# Patient Record
Sex: FEMALE | Race: WHITE | HISPANIC OR LATINO | URBAN - METROPOLITAN AREA
[De-identification: names, ages, dates, MRNs, and addresses within clinical notes are randomized per-mention and may not be internally consistent; named-entity substitution may affect disease eponyms.]

---

## 2017-06-21 ENCOUNTER — OUTPATIENT (OUTPATIENT)
Dept: OUTPATIENT SERVICES | Facility: HOSPITAL | Age: 82
LOS: 1 days | Discharge: HOME | End: 2017-06-21

## 2017-06-21 DIAGNOSIS — N39.0 URINARY TRACT INFECTION, SITE NOT SPECIFIED: ICD-10-CM

## 2017-06-21 DIAGNOSIS — I50.9 HEART FAILURE, UNSPECIFIED: ICD-10-CM

## 2017-06-28 DIAGNOSIS — R06.02 SHORTNESS OF BREATH: ICD-10-CM

## 2018-06-13 ENCOUNTER — OUTPATIENT (OUTPATIENT)
Dept: OUTPATIENT SERVICES | Facility: HOSPITAL | Age: 83
LOS: 1 days | Discharge: HOME | End: 2018-06-13

## 2018-06-13 DIAGNOSIS — D68.9 COAGULATION DEFECT, UNSPECIFIED: ICD-10-CM

## 2018-06-13 DIAGNOSIS — I10 ESSENTIAL (PRIMARY) HYPERTENSION: ICD-10-CM

## 2018-06-13 DIAGNOSIS — E34.9 ENDOCRINE DISORDER, UNSPECIFIED: ICD-10-CM

## 2018-06-18 ENCOUNTER — OUTPATIENT (OUTPATIENT)
Dept: OUTPATIENT SERVICES | Facility: HOSPITAL | Age: 83
LOS: 1 days | Discharge: HOME | End: 2018-06-18

## 2018-06-18 DIAGNOSIS — I48.91 UNSPECIFIED ATRIAL FIBRILLATION: ICD-10-CM

## 2018-06-18 RX ORDER — KETOROLAC TROMETHAMINE 30 MG/ML
30 SYRINGE (ML) INJECTION ONCE
Qty: 0 | Refills: 0 | Status: DISCONTINUED | OUTPATIENT
Start: 2018-06-18 | End: 2018-06-18

## 2018-06-18 RX ADMIN — Medication 30 MILLIGRAM(S): at 14:06

## 2018-06-18 NOTE — H&P CARDIOLOGY - HISTORY OF PRESENT ILLNESS
Patient is 85y Female presents for [facility] to eval mitral valve disease.  she c/o sob with exertion.       PMH   cad, htn, dld, dvt     SOCIAL HISTORY   denies     SURGICAL HISTORY   cabg     FAMILY HISTORY   denies       REVIEW OF SYSTEMS     CONSTITUTIONAL: No weakness, fevers or chills  EYES/ENT: No visual changes;  No vertigo or throat pain   NECK: No pain or stiffness  RESPIRATORY: No cough, wheezing, hemoptysis; SEE HPI  CARDIOVASCULAR: SEE HPI  GASTROINTESTINAL: No abdominal or epigastric pain. No nausea, vomiting, or hematemesis; No diarrhea or constipation. No melena or hematochezia.  GENITOURINARY: No dysuria, frequency or hematuria  NEUROLOGICAL: No numbness or weakness  SKIN: No itching, rashes       PHYSICAL EXAM       GENERAL: NAD  HEAD:  Atraumatic, Normocephalic  EYES: conjunctiva and sclera clear  NECK: No JVD  CHEST/LUNG: Clear to auscultation bilaterally; No wheeze  HEART: Regular rate and rhythm; No murmurs  ABDOMEN: Soft, Nontender, Nondistended; Bowel sounds present  EXTREMITIES:  PTs/DPs 2+ ,  Rads/Ulnars 2+,  No clubbing, cyanosis, or edema  NEUROLOGY:  A&Ox3, appropriate  SKIN: No rashes or lesions

## 2018-06-18 NOTE — PROGRESS NOTE ADULT - SUBJECTIVE AND OBJECTIVE BOX
I have personally seen and examined the patient.  I agree with the history and physical which I have reviewed and noted any changes below.  06-18-18 @ 12:21    PRE-OP DIAGNOSIS: cad    PROCEDURE:    Physician: cici  Assistant:    ANESTHESIA TYPE:  [  ]General Anesthesia  [  ] Sedation  [  ] Local/Regional    ESTIMATED BLOOD LOSS:       mL    CONDITION  [  ] Critical  [  ] Serious  [  ]Fair  [  ]Good      SPECIMENS REMOVED (IF APPLICABLE):      IV CONTRAST:             mL      IMPLANTS (IF APPLICABLE)      FINDINGS    Left Heart Catheterization:  LVEF%:  LVEDP:  [ ] Normal Coronary Arteries  [ ] Luminal Irregularities  [ ] Non-obstructive CAD      LEFT HEART CATHERIZATION                                    Left main     LAD:                        Diag:    Left Circumflex:  OM:    Right Cornary Artery:  RPDA  RPL    Ramus Intermed:    INTERVENTION  IMPLANTS:    RIGHT HEART CATHERIZATION  PA:  PCW:  CO/CI:      POST-OP DIAGNOSIS          PLAN OF CARE  [ ] D/C Home today  [ ]  D/C in AM  [ ] Return to In-patient bed  [ ] Admit for observation  [ ] Return for staged procedure:  [ ] CT Surgery consult called  [ ]  Continue DAPT, B-blocker & Statin therapy I have personally seen and examined the patient.  I agree with the history and physical which I have reviewed and noted any changes below.  06-18-18 @ 12:21    PRE-OP DIAGNOSIS: cad    PROCEDURE: lhc/sca/svg/lima    Physician: cici  Assistant: annie    ANESTHESIA TYPE:  [  ]General Anesthesia  [ x ] Sedation  [ x ] Local/Regional    ESTIMATED BLOOD LOSS:  10     mL    CONDITION  [  ] Critical  [  ] Serious  [  ]Fair  [ x ]Good      SPECIMENS REMOVED (IF APPLICABLE):      IV CONTRAST:   80          mL      IMPLANTS (IF APPLICABLE)      FINDINGS    Left Heart Catheterization:  LVEF%: 16  LVEDP:  [ ] Normal Coronary Arteries  [ ] Luminal Irregularities  [x ] 2v native CAD, patent BYPASS GRAFTS        POST-OP DIAGNOSIS    same      PLAN OF CARE  [x ] D/C Home today  [ ]  D/C in AM  [ ] Return to In-patient bed  [ ] Admit for observation  [ ] Return for staged procedure:  [ ] CT Surgery consult called  [x ]  Continue DAPT, B-blocker & Statin therapy

## 2018-06-18 NOTE — PROCEDURE NOTE - ADDITIONAL PROCEDURE DETAILS
CAROLINA Procedure Note:     After risks, benefits and alternatives of the procedure were explained, consent was signed and placed in the medical record.  Procedural timeout was taken.  Sedation was administered by anesthesia.  CAROLINA probe inserted without complication and CAROLINA performed.  Patient tolerated the procedure well without complication.  Post-procedure vital signs were stable.    CAROLINA findings discussed with patient and referring cardiologist.       CAROLINA findings:  LVEF 50-55%  Severe mitral stenosis 3D planimetry valve area 1cm2   Mitral valve ring noted  mild TR  severely dilated LA  moderate AI eccenteric jet  RAYNA no thrombus noted      See full report for findings.      Post procedure vitals: stable      Post procedure EKG : Sinus at 81 bpm    Recommendations:  Continue current medications.  Will discuss with family regarding further management options.

## 2020-06-04 ENCOUNTER — INPATIENT (INPATIENT)
Facility: HOSPITAL | Age: 85
LOS: 4 days | Discharge: HOME HEALTH | End: 2020-06-09
Attending: INTERNAL MEDICINE | Admitting: INTERNAL MEDICINE
Payer: MEDICARE

## 2020-06-04 VITALS
OXYGEN SATURATION: 95 % | HEART RATE: 94 BPM | TEMPERATURE: 101 F | RESPIRATION RATE: 20 BRPM | DIASTOLIC BLOOD PRESSURE: 60 MMHG | SYSTOLIC BLOOD PRESSURE: 124 MMHG

## 2020-06-04 LAB
ALBUMIN SERPL ELPH-MCNC: 3.2 G/DL — LOW (ref 3.5–5.2)
ALP SERPL-CCNC: 93 U/L — SIGNIFICANT CHANGE UP (ref 30–115)
ALT FLD-CCNC: 33 U/L — SIGNIFICANT CHANGE UP (ref 0–41)
ANION GAP SERPL CALC-SCNC: 15 MMOL/L — HIGH (ref 7–14)
AST SERPL-CCNC: 45 U/L — HIGH (ref 0–41)
BASE EXCESS BLDV CALC-SCNC: 9.1 MMOL/L — HIGH (ref -2–2)
BASOPHILS # BLD AUTO: 0 K/UL — SIGNIFICANT CHANGE UP (ref 0–0.2)
BASOPHILS NFR BLD AUTO: 0 % — SIGNIFICANT CHANGE UP (ref 0–1)
BILIRUB SERPL-MCNC: 0.7 MG/DL — SIGNIFICANT CHANGE UP (ref 0.2–1.2)
BLD GP AB SCN SERPL QL: SIGNIFICANT CHANGE UP
BUN SERPL-MCNC: 47 MG/DL — HIGH (ref 10–20)
CA-I SERPL-SCNC: 1.19 MMOL/L — SIGNIFICANT CHANGE UP (ref 1.12–1.3)
CALCIUM SERPL-MCNC: 9.1 MG/DL — SIGNIFICANT CHANGE UP (ref 8.5–10.1)
CHLORIDE SERPL-SCNC: 95 MMOL/L — LOW (ref 98–110)
CO2 SERPL-SCNC: 30 MMOL/L — SIGNIFICANT CHANGE UP (ref 17–32)
CREAT SERPL-MCNC: 1.4 MG/DL — SIGNIFICANT CHANGE UP (ref 0.7–1.5)
EOSINOPHIL # BLD AUTO: 0 K/UL — SIGNIFICANT CHANGE UP (ref 0–0.7)
EOSINOPHIL NFR BLD AUTO: 0 % — SIGNIFICANT CHANGE UP (ref 0–8)
GAS PNL BLDV: 139 MMOL/L — SIGNIFICANT CHANGE UP (ref 136–145)
GAS PNL BLDV: SIGNIFICANT CHANGE UP
GIANT PLATELETS BLD QL SMEAR: PRESENT — SIGNIFICANT CHANGE UP
GLUCOSE SERPL-MCNC: 165 MG/DL — HIGH (ref 70–99)
HCO3 BLDV-SCNC: 35 MMOL/L — HIGH (ref 22–29)
HCT VFR BLD CALC: 37.5 % — SIGNIFICANT CHANGE UP (ref 37–47)
HCT VFR BLDA CALC: 48.5 % — HIGH (ref 34–44)
HGB BLD CALC-MCNC: 15.8 G/DL — SIGNIFICANT CHANGE UP (ref 14–18)
HGB BLD-MCNC: 12.4 G/DL — SIGNIFICANT CHANGE UP (ref 12–16)
INR BLD: 1.19 RATIO — SIGNIFICANT CHANGE UP (ref 0.65–1.3)
LACTATE BLDV-MCNC: 1.2 MMOL/L — SIGNIFICANT CHANGE UP (ref 0.5–1.6)
LIDOCAIN IGE QN: 10 U/L — SIGNIFICANT CHANGE UP (ref 7–60)
LYMPHOCYTES # BLD AUTO: 0.19 K/UL — LOW (ref 1.2–3.4)
LYMPHOCYTES # BLD AUTO: 0.9 % — LOW (ref 20.5–51.1)
MACROCYTES BLD QL: SLIGHT — SIGNIFICANT CHANGE UP
MANUAL SMEAR VERIFICATION: SIGNIFICANT CHANGE UP
MCHC RBC-ENTMCNC: 29 PG — SIGNIFICANT CHANGE UP (ref 27–31)
MCHC RBC-ENTMCNC: 33.1 G/DL — SIGNIFICANT CHANGE UP (ref 32–37)
MCV RBC AUTO: 87.6 FL — SIGNIFICANT CHANGE UP (ref 81–99)
MONOCYTES # BLD AUTO: 1.29 K/UL — HIGH (ref 0.1–0.6)
MONOCYTES NFR BLD AUTO: 6.1 % — SIGNIFICANT CHANGE UP (ref 1.7–9.3)
NEUTROPHILS # BLD AUTO: 19.73 K/UL — HIGH (ref 1.4–6.5)
NEUTROPHILS NFR BLD AUTO: 88.7 % — HIGH (ref 42.2–75.2)
NEUTS BAND # BLD: 4.3 % — SIGNIFICANT CHANGE UP (ref 0–6)
OVALOCYTES BLD QL SMEAR: SLIGHT — SIGNIFICANT CHANGE UP
PCO2 BLDV: 48 MMHG — SIGNIFICANT CHANGE UP (ref 41–51)
PH BLDV: 7.46 — HIGH (ref 7.26–7.43)
PLAT MORPH BLD: NORMAL — SIGNIFICANT CHANGE UP
PLATELET # BLD AUTO: 208 K/UL — SIGNIFICANT CHANGE UP (ref 130–400)
PO2 BLDV: 31 MMHG — SIGNIFICANT CHANGE UP (ref 20–40)
POLYCHROMASIA BLD QL SMEAR: SIGNIFICANT CHANGE UP
POTASSIUM BLDV-SCNC: 3 MMOL/L — LOW (ref 3.3–5.6)
POTASSIUM SERPL-MCNC: 3.4 MMOL/L — LOW (ref 3.5–5)
POTASSIUM SERPL-SCNC: 3.4 MMOL/L — LOW (ref 3.5–5)
PROT SERPL-MCNC: 6.5 G/DL — SIGNIFICANT CHANGE UP (ref 6–8)
PROTHROM AB SERPL-ACNC: 13.7 SEC — HIGH (ref 9.95–12.87)
RBC # BLD: 4.28 M/UL — SIGNIFICANT CHANGE UP (ref 4.2–5.4)
RBC # FLD: 14.7 % — HIGH (ref 11.5–14.5)
RBC BLD AUTO: ABNORMAL
SAO2 % BLDV: 59 % — SIGNIFICANT CHANGE UP
SODIUM SERPL-SCNC: 140 MMOL/L — SIGNIFICANT CHANGE UP (ref 135–146)
TROPONIN T SERPL-MCNC: <0.01 NG/ML — SIGNIFICANT CHANGE UP
WBC # BLD: 21.22 K/UL — HIGH (ref 4.8–10.8)
WBC # FLD AUTO: 21.22 K/UL — HIGH (ref 4.8–10.8)

## 2020-06-04 PROCEDURE — 93010 ELECTROCARDIOGRAM REPORT: CPT

## 2020-06-04 PROCEDURE — 76705 ECHO EXAM OF ABDOMEN: CPT | Mod: 26

## 2020-06-04 PROCEDURE — 99285 EMERGENCY DEPT VISIT HI MDM: CPT

## 2020-06-04 PROCEDURE — 71045 X-RAY EXAM CHEST 1 VIEW: CPT | Mod: 26

## 2020-06-04 PROCEDURE — 74177 CT ABD & PELVIS W/CONTRAST: CPT | Mod: 26

## 2020-06-04 PROCEDURE — 71045 X-RAY EXAM CHEST 1 VIEW: CPT | Mod: 26,77

## 2020-06-04 RX ORDER — FUROSEMIDE 40 MG
40 TABLET ORAL ONCE
Refills: 0 | Status: COMPLETED | OUTPATIENT
Start: 2020-06-04 | End: 2020-06-04

## 2020-06-04 RX ORDER — AMLODIPINE BESYLATE 2.5 MG/1
5 TABLET ORAL DAILY
Refills: 0 | Status: DISCONTINUED | OUTPATIENT
Start: 2020-06-04 | End: 2020-06-04

## 2020-06-04 RX ORDER — SIMETHICONE 80 MG/1
125 TABLET, CHEWABLE ORAL DAILY
Refills: 0 | Status: DISCONTINUED | OUTPATIENT
Start: 2020-06-04 | End: 2020-06-04

## 2020-06-04 RX ORDER — CIPROFLOXACIN LACTATE 400MG/40ML
400 VIAL (ML) INTRAVENOUS ONCE
Refills: 0 | Status: COMPLETED | OUTPATIENT
Start: 2020-06-04 | End: 2020-06-04

## 2020-06-04 RX ORDER — METOPROLOL TARTRATE 50 MG
25 TABLET ORAL DAILY
Refills: 0 | Status: DISCONTINUED | OUTPATIENT
Start: 2020-06-04 | End: 2020-06-09

## 2020-06-04 RX ORDER — ATORVASTATIN CALCIUM 80 MG/1
40 TABLET, FILM COATED ORAL DAILY
Refills: 0 | Status: DISCONTINUED | OUTPATIENT
Start: 2020-06-04 | End: 2020-06-09

## 2020-06-04 RX ORDER — METRONIDAZOLE 500 MG
500 TABLET ORAL ONCE
Refills: 0 | Status: COMPLETED | OUTPATIENT
Start: 2020-06-04 | End: 2020-06-04

## 2020-06-04 RX ORDER — SODIUM CHLORIDE 9 MG/ML
1000 INJECTION INTRAMUSCULAR; INTRAVENOUS; SUBCUTANEOUS ONCE
Refills: 0 | Status: COMPLETED | OUTPATIENT
Start: 2020-06-04 | End: 2020-06-04

## 2020-06-04 RX ORDER — AMLODIPINE BESYLATE 2.5 MG/1
1 TABLET ORAL
Qty: 0 | Refills: 0 | DISCHARGE

## 2020-06-04 RX ORDER — CHOLECALCIFEROL (VITAMIN D3) 125 MCG
5000 CAPSULE ORAL DAILY
Refills: 0 | Status: DISCONTINUED | OUTPATIENT
Start: 2020-06-04 | End: 2020-06-09

## 2020-06-04 RX ORDER — ACETAMINOPHEN 500 MG
975 TABLET ORAL ONCE
Refills: 0 | Status: COMPLETED | OUTPATIENT
Start: 2020-06-04 | End: 2020-06-04

## 2020-06-04 RX ORDER — FERROUS SULFATE 325(65) MG
325 TABLET ORAL DAILY
Refills: 0 | Status: DISCONTINUED | OUTPATIENT
Start: 2020-06-04 | End: 2020-06-09

## 2020-06-04 RX ORDER — SENNA PLUS 8.6 MG/1
2 TABLET ORAL AT BEDTIME
Refills: 0 | Status: DISCONTINUED | OUTPATIENT
Start: 2020-06-04 | End: 2020-06-09

## 2020-06-04 RX ORDER — PIPERACILLIN AND TAZOBACTAM 4; .5 G/20ML; G/20ML
3.38 INJECTION, POWDER, LYOPHILIZED, FOR SOLUTION INTRAVENOUS EVERY 8 HOURS
Refills: 0 | Status: DISCONTINUED | OUTPATIENT
Start: 2020-06-04 | End: 2020-06-05

## 2020-06-04 RX ORDER — FOLIC ACID 0.8 MG
1 TABLET ORAL DAILY
Refills: 0 | Status: DISCONTINUED | OUTPATIENT
Start: 2020-06-04 | End: 2020-06-09

## 2020-06-04 RX ORDER — FUROSEMIDE 40 MG
40 TABLET ORAL DAILY
Refills: 0 | Status: DISCONTINUED | OUTPATIENT
Start: 2020-06-04 | End: 2020-06-09

## 2020-06-04 RX ADMIN — Medication 975 MILLIGRAM(S): at 17:40

## 2020-06-04 RX ADMIN — Medication 200 MILLIGRAM(S): at 17:41

## 2020-06-04 RX ADMIN — SODIUM CHLORIDE 1000 MILLILITER(S): 9 INJECTION INTRAMUSCULAR; INTRAVENOUS; SUBCUTANEOUS at 17:46

## 2020-06-04 RX ADMIN — Medication 100 MILLIGRAM(S): at 19:20

## 2020-06-04 NOTE — CONSULT NOTE ADULT - SUBJECTIVE AND OBJECTIVE BOX
CHINA BAÑUELOS [id number]  87y Female    HPI:  87 y.o. female with a PMH of HTN, CHF, CAD s/p CABG, and osteoporosis presented to the ER c/o diffuse abdominal pain, nausea, and vomiting for the past 4 days.  (+) fever today. She also endorses loss of appetite, has not had any PO intake in the last 5 days. On arrival to the ED she is vitally stable, Tmax of 100.5. Her labs significant for leukocytosis, WBC of 21.22. Electrolyte abnormalities notable.     PAST MEDICAL & SURGICAL HISTORY:  High cholesterol  CHF (congestive heart failure)  HTN (hypertension)  Multiple hernia repairs (umbilical and inguinal)     Allergies  cefepime (Unknown)  varicella virus vaccine (Unknown)    REVIEW OF SYSTEMS    [ X ] A ten-point review of systems was otherwise negative except as noted.  [ ] Due to altered mental status/intubation, subjective information were not able to be obtained from the patient. History was obtained, to the extent possible, from review of the chart and collateral sources of information.    Vital Signs Last 24 Hrs  T(C): 37.3 (04 Jun 2020 17:29), Max: 38.1 (04 Jun 2020 17:01)  T(F): 99.1 (04 Jun 2020 17:29), Max: 100.5 (04 Jun 2020 17:01)  HR: 93 (04 Jun 2020 17:29) (93 - 94)  BP: 179/76 (04 Jun 2020 17:29) (124/60 - 179/76)  RR: 20 (04 Jun 2020 17:29) (20 - 20)  SpO2: 95% (04 Jun 2020 17:29) (95% - 95%)    PHYSICAL EXAM:  GENERAL: NAD, well-appearing  CHEST/LUNG: Bilateral expiratory wheezes   HEART: Regular rate and rhythm  ABDOMEN: Soft, Nontender, Nondistended  EXTREMITIES:  No clubbing, cyanosis, or edema    LABS:               12.4   21.22 )-----------( 208      ( 04 Jun 2020 17:47 )             37.5       Auto Neutrophil %: 88.7 % (06-04-20 @ 17:47)  Band Neutrophils %: 4.3 % (06-04-20 @ 17:47)    06-04    140  |  95<L>  |  47<H>  ----------------------------<  165<H>  3.4<L>   |  30  |  1.4    Calcium, Total Serum: 9.1 mg/dL (06-04-20 @ 17:47)    LFTs:             6.5  | 0.7  | 45       ------------------[93      ( 04 Jun 2020 17:47 )  3.2  | x    | 33          Lipase:10       Blood Gas Venous - Lactate: 1.2 mmoL/L (06-04-20 @ 18:09)    Coags:     13.70  ----< 1.19    ( 04 Jun 2020 17:47 )     x        CARDIAC MARKERS ( 04 Jun 2020 17:47 )  x     / <0.01 ng/mL / x     / x     / x        RADIOLOGY & ADDITIONAL STUDIES:  **F/U CT read CHINA BAÑUELOS [id number]  87y Female    HPI:  87 y.o. female with a PMH of HTN, CHF, CAD s/p CABG, and osteoporosis presented to the ER c/o diffuse abdominal pain, nausea, and vomiting for the past 4 days.  (+) fever today. She also endorses loss of appetite, has not had any PO intake in the last 5 days. On arrival to the ED she is vitally stable, Tmax of 100.5. Her labs significant for leukocytosis, WBC of 21.22. Electrolyte abnormalities notable.     PAST MEDICAL & SURGICAL HISTORY:  High cholesterol  CHF (congestive heart failure)  HTN (hypertension)  Multiple hernia repairs (umbilical and inguinal)     Allergies  cefepime (Unknown)  varicella virus vaccine (Unknown)    REVIEW OF SYSTEMS    [ X ] A ten-point review of systems was otherwise negative except as noted.  [ ] Due to altered mental status/intubation, subjective information were not able to be obtained from the patient. History was obtained, to the extent possible, from review of the chart and collateral sources of information.    Vital Signs Last 24 Hrs  T(C): 37.3 (04 Jun 2020 17:29), Max: 38.1 (04 Jun 2020 17:01)  T(F): 99.1 (04 Jun 2020 17:29), Max: 100.5 (04 Jun 2020 17:01)  HR: 93 (04 Jun 2020 17:29) (93 - 94)  BP: 179/76 (04 Jun 2020 17:29) (124/60 - 179/76)  RR: 20 (04 Jun 2020 17:29) (20 - 20)  SpO2: 95% (04 Jun 2020 17:29) (95% - 95%)    PHYSICAL EXAM:  GENERAL: NAD, well-appearing  CHEST/LUNG: Bilateral expiratory wheezes   HEART: Regular rate and rhythm  ABDOMEN: Soft, Nontender, Nondistended  EXTREMITIES:  No clubbing, cyanosis, or edema    LABS:               12.4   21.22 )-----------( 208      ( 04 Jun 2020 17:47 )             37.5       Auto Neutrophil %: 88.7 % (06-04-20 @ 17:47)  Band Neutrophils %: 4.3 % (06-04-20 @ 17:47)    06-04    140  |  95<L>  |  47<H>  ----------------------------<  165<H>  3.4<L>   |  30  |  1.4    Calcium, Total Serum: 9.1 mg/dL (06-04-20 @ 17:47)    LFTs:             6.5  | 0.7  | 45       ------------------[93      ( 04 Jun 2020 17:47 )  3.2  | x    | 33          Lipase:10       Blood Gas Venous - Lactate: 1.2 mmoL/L (06-04-20 @ 18:09)    Coags:     13.70  ----< 1.19    ( 04 Jun 2020 17:47 )     x        CARDIAC MARKERS ( 04 Jun 2020 17:47 )  x     / <0.01 ng/mL / x     / x     / x        RADIOLOGY & ADDITIONAL STUDIES:  < from: US Abdomen Limited (06.04.20 @ 20:29) >  IMPRESSION:    Cholelithiasis with gallbladder wall thickening compatible with acute cholecystitis (comparison made to recent CT which confirms the findings)GALLBLADDER/BILIARY TREE:  Cholelithiasis and sludge with wall thickening to 5 mm. Negative sonographic Mccloud's sign. No intrahepatic biliary ductal dilatation. The common bile duct measures 8 mm, which is dilated.     < end of copied text >  < from: CT Abdomen and Pelvis w/ IV Cont (06.04.20 @ 19:00) >  IMPRESSION:    1.  Acute cholecystitis.    2.  Colonic diverticulosis without evidence of diverticulitis.HEPATOBILIARY: Cholelithiasis with pericholecystic edema and surrounding fatty infiltration consistent with acute cholecystitis.    < end of copied text >

## 2020-06-04 NOTE — ED PROVIDER NOTE - ATTENDING CONTRIBUTION TO CARE
86yo F history of HTN DL CAD CABG hernia repair presenting with abdominal pain, nausea/vomiting x4d gradually worsening to today. +f/c. No chest pain, shortness of breath, cough, dysuria/hematuria, back pain, diarrhea   Constitutional: Well appearing. No acute distress. Non toxic.   Eyes: PERRLA. Extraocular movements intact, no entrapment. Conjunctiva normal.   ENT: No nasal discharge. Moist mucus membranes.  Neck: Supple, non tender, full range of motion.  CV: RRR no murmurs, rubs, or gallops. +S1S2.   Pulm: Clear to auscultation bilaterally. Normal work of breathing.  Abd: soft diffusely ttp, upper > lower, no rebound no guarding ND +BS.   Ext: Warm and well perfused x4, moving all extremities, no edema.   Psy: Cooperative, appropriate.   Skin: Warm, dry, no rash  Neuro: CN2-12 grossly intact no sensory or motor deficits throughout, no drift, no ataxia

## 2020-06-04 NOTE — H&P ADULT - NSHPPHYSICALEXAM_GEN_ALL_CORE
GENERAL: NAD, lying in bed comfortably  HEAD:  Atraumatic, Normocephalic  EYES: EOMI, PERRLA, conjunctiva and sclera clear  ENT: Moist mucous membranes  NECK: Supple, No JVD  CHEST/LUNG: Crackles bilateral   HEART: Regular rate and rhythm;  ABDOMEN:  Soft, RUQ tenderness, + keita's  EXTREMITIES:  brisk capillary refill. No clubbing, cyanosis, or edema  NERVOUS SYSTEM:  Alert & Oriented X3, speech clear. No deficits   MSK: FROM all 4 extremities, full and equal strength  SKIN: No rashes or lesions

## 2020-06-04 NOTE — ED ADULT NURSE NOTE - CHIEF COMPLAINT QUOTE
abdominal pain, nausea and vomiting and weakness since Monday, also has elevated WBC 27 100.4 in triage

## 2020-06-04 NOTE — ED ADULT NURSE NOTE - PMH
CHF (congestive heart failure)    High cholesterol    HTN (hypertension)    No pertinent past medical history

## 2020-06-04 NOTE — H&P ADULT - NSICDXPASTMEDICALHX_GEN_ALL_CORE_FT
PAST MEDICAL HISTORY:  CHF (congestive heart failure)     High cholesterol     HTN (hypertension)     No pertinent past medical history

## 2020-06-04 NOTE — H&P ADULT - ASSESSMENT
87 year old female with a PMH of HTN, CHF, CAD s/p CABG, and osteoporosis presents with abdominal pain for 4 days duration.    # Acute Cholecystitis   - ED hemodynamically stable febrile 100.5  WBC of 21.22.  - CT/ US findings suggest cholecystectomy  - surgery :  not a candidate for surgical intervention.   - IR called by ED for possible percutaneous drainage   - s/p 2 Ls in ED please avoid volume overload   - s/p cirpo/flagyl in ed , will continue with Zosyn for now   - blood culture   - clear liquids for now, NPO after midnight for possible IR intervention.     # CAD, s/p CABG, CHF ( no available recent EF in the system, 2018 50%)  - c/w Lipitor  -  keep Lasix and metolazone   - received 2 Ls in ED, crackles on p/e, will give one dose of IV Lasix and reassess  - repeat CXR     # NELLIE c/w ferrous sulfate   # Constipation and diverticular disease: c/w senna add Miralax PRN  # Folic acid and B12 def c/w supplement   # osteoporosis on alendronate once/weekly   # DVT PPX Lovenox   # Clear liquids   # Full code, attempted to call daughter ( Nia for update @[phone], no answer) 87 year old female with a PMH of HTN, CHF, CAD s/p CABG, and osteoporosis presents with abdominal pain for 4 days duration.    # Acute Cholecystitis   - ED hemodynamically stable febrile 100.5  WBC of 21.22.  - CT/ US findings suggest cholecystectomy  - surgery :  not a candidate for surgical intervention.   - IR called by ED for possible percutaneous drainage   - s/p 2 Ls in ED please avoid volume overload   - s/p cirpo/flagyl in ed , will continue with Zosyn for now   - blood culture   - clear liquids for now, NPO after midnight for possible IR intervention.     # CAD, s/p CABG, CHF ( no available recent EF in the system, 2018 50%)  - c/w Lipitor  -  keep Lasix and metolazone   - received 2 Ls in ED, crackles on p/e, will give one dose of IV Lasix and reassess  - repeat CXR   - daily weight, strict intake/output    # NELLIE c/w ferrous sulfate   # Constipation and diverticular disease: c/w senna add Miralax PRN  # Folic acid and B12 def c/w supplement   # osteoporosis on alendronate once/weekly   # DVT PPX Lovenox   # Clear liquids   # Full code, attempted to call daughter ( Nia for update @[phone], no answer) 87 year old female with a PMH of HTN, CHF, CAD s/p CABG, and osteoporosis presents with abdominal pain for 4 days duration.    # Acute Cholecystitis   - ED hemodynamically stable febrile 100.5  WBC of 21.22.  - CT/ US findings suggest cholecystectomy  - surgery :  not a candidate for surgical intervention.   - IR called by ED for possible percutaneous drainage   - s/p 2 Ls in ED please avoid volume overload   - s/p cirpo/flagyl in ed , will continue with Zosyn for now   - blood culture   - clear liquids for now, NPO after midnight for possible IR intervention.     # CAD, s/p CABG, CHF ( no available recent EF in the system, 2018 50%)  - c/w Lipitor not on aspirin at home   -  keep Lasix and metolazone   - received 2 Ls in ED, crackles on p/e, will give one dose of IV Lasix and reassess  - repeat CXR   - daily weight, strict intake/output  - following with Dr garrison      # NELLIE c/w ferrous sulfate   # Constipation and diverticular disease: c/w senna add Miralax PRN  # Folic acid and B12 def c/w supplement   # osteoporosis on alendronate once/weekly   # DVT PPX Lovenox   # Clear liquids   # Full code, attempted to call daughter ( Nia for update @[phone], no answer) 87 year old female with a PMH of HTN, CHF, CAD s/p CABG, and osteoporosis presents with abdominal pain for 4 days duration.    # Acute Cholecystitis   - ED hemodynamically stable febrile 100.5  WBC of 21.22.  - CT/ US findings suggest cholecystitis  - surgery :  not a candidate for surgical intervention.   - IR called by ED for possible percutaneous drainage   - s/p 2 Ls in ED please avoid volume overload   - s/p cirpo/flagyl in ed , will continue  - blood culture   - clear liquids for now, NPO after midnight for possible IR intervention.     # CAD, s/p CABG, CHF ( no available recent EF in the system, 2018 50%)  - c/w Lipitor not on aspirin at home   -  keep Lasix and metolazone   - received 2 Ls in ED, crackles on p/e, will give one dose of IV Lasix and reassess  - repeat CXR   - daily weight, strict intake/output  - following with Dr garrison      # NELLIE c/w ferrous sulfate   # Constipation and diverticular disease: c/w senna add Miralax PRN  # Folic acid and B12 def c/w supplement   # osteoporosis on alendronate once/weekly   # DVT PPX Lovenox   # Clear liquids   # Full code, attempted to call daughter ( Nia for update @[phone], no answer)

## 2020-06-04 NOTE — ED PROVIDER NOTE - OBJECTIVE STATEMENT
87 y.o. female with a PMH of ASHD, HTN, and osteoporosis presented to the ER c/o diffuse abdominal pain, nausea, and vomiting for the past 4 days.  (+) fever today.  Pt denies diarrhea, bloody stool, chest pain, dysuria, hematuria, flank pain, dizziness, SOB.  No other complaints. 87 y.o. female with a PMH of ASHD, HTN, and osteoporosis presented to the ER c/o diffuse abdominal pain, nausea, and vomiting for the past 4 days.  (+) fever today.  Pt denies diarrhea, bloody stool, chest pain, dysuria, hematuria, flank pain, dizziness, SOB.  No other complaints. Gradual onset, moderate, no exac/relieving factors

## 2020-06-04 NOTE — ED PROVIDER NOTE - CLINICAL SUMMARY MEDICAL DECISION MAKING FREE TEXT BOX
86yo F history of HTN DL CAD CABG hernia repair presenting with abdominal pain, nausea/vomiting x4d gradually worsening to today. +f/c. No chest pain, shortness of breath, cough, dysuria/hematuria, back pain, diarrhea. labs imaging reviewed. 88yo F history of HTN DL CAD CABG hernia repair presenting with abdominal pain, nausea/vomiting x4d gradually worsening to today. +f/c. No chest pain, shortness of breath, cough, dysuria/hematuria, back pain, diarrhea. labs imaging reviewed. sp surgery eval, recs medical management as pt is not a surgical candidate. will admit.

## 2020-06-04 NOTE — CONSULT NOTE ADULT - ASSESSMENT
87 y.o. female with a PMH of HTN, CHF, CAD s/p CABG, and osteoporosis presented to the ER c/o diffuse abdominal pain, nausea, and vomiting for the past 4 days, concern for acute cholecystitis.     Plan:   -Patient not medically optimized for surgery   -Recommend admission to medicine for workup/treatment of electrolyte imbalances   -Likely will need IR for percutaneous drainage of gallbladder   -Surgery to follow 87 y.o. female with a PMH of HTN, CHF, CAD s/p CABG, and osteoporosis presented to the ER c/o diffuse abdominal pain, nausea, and vomiting for the past 4 days, concern for acute cholecystitis.     Plan:   -Patient not medically optimized for surgery   -Recommend admission to medicine for workup/treatment of electrolyte imbalances   -Likely will need IR for percutaneous drainage of gallbladder   -Surgery to follow      Senior Resident Addendum  Patient audibly wheezing on exam with crackles, RUQ tenderness, low grade fever in ED, leukocytosis with CT and U/S findings consistent with acute cholecystitis  - In light of extensive medical comorbidities, prolonged illness, patient is not a candidate for surgical intervention  - recommend broad-spectrum abx, zosyn for antibiotic coverage  - if patient's status worsens, recommend consideration of IR consult for percutaneous cholecystostomy drain placement  - case discussed with ED, patient, and Dr. Sarkar

## 2020-06-04 NOTE — ED PROVIDER NOTE - PROGRESS NOTE DETAILS
spoke to Dr. Casiano from [facility], will consult on case, needs RUQ US spoke to Dr. Casiano, as per sx attending pt not a candidate for sx, admit to medicine, ID consult spoke to Dr. Casiano, as per sx attending Dr. Sarkar pt not a candidate for sx, admit to medicine, ID consult spoke to radiology regarding percutaneous drainage, likely will not be done tonight, await callback spoke to daughter Nia Beauchamp [phone], pt not taking ASA, Plavix, or any anticoagulation, aware mother is admitted to medicine spoke to Dr. Fontanez radiology, percutaneous drainage of gallbladder in AM, pt made NPO, aware pt isn't on any anticoagulation

## 2020-06-04 NOTE — ED ADULT NURSE NOTE - NSIMPLEMENTINTERV_GEN_ALL_ED
Implemented All Fall with Harm Risk Interventions:  [place] to call system. Call bell, personal items and telephone within reach. Instruct patient to call for assistance. Room bathroom lighting operational. Non-slip footwear when patient is off stretcher. Physically safe environment: no spills, clutter or unnecessary equipment. Stretcher in lowest position, wheels locked, appropriate side rails in place. Provide visual cue, wrist band, yellow gown, etc. Monitor gait and stability. Monitor for mental status changes and reorient to person, place, and time. Review medications for side effects contributing to fall risk. Reinforce activity limits and safety measures with patient and family. Provide visual clues: red socks.

## 2020-06-04 NOTE — ED ADULT TRIAGE NOTE - CHIEF COMPLAINT QUOTE
abdominal pain, nausea and vomiting and weakness since Monday, also has elevated WBC 27 abdominal pain, nausea and vomiting and weakness since Monday, also has elevated WBC 27 100.4 in triage

## 2020-06-04 NOTE — H&P ADULT - HISTORY OF PRESENT ILLNESS
87 year old female with a PMH of HTN, CHF, CAD s/p CABG, and osteoporosis presented to the ER c/o diffuse abdominal pain, nausea, and vomiting for the past 4 days.  (+) fever today. She also endorses loss of appetite, has not had any PO intake in the last 5 days. On arrival to the ED she is vitally stable, Tmax of 100.5. Her labs significant for leukocytosis, WBC of 21.22. Electrolyte abnormalities notable. 87 year old female with a PMH of HTN, CHF, CAD s/p CABG, and osteoporosis presents with abdominal pain for 4 days duration.  patient was doing well until 4 days prior to presentation when started to RUQ pain, moderate in severity 6/10, no radiation episodic, associated with loss of appetite nausea with no vomiting, no diarrhea, no melena, no hematemesis no BRBPR, today she spiked fever, no chills.   denies chest pain, cough, or SOB.   In ED hemodynamically stable febrile 100.5  WBC of 21.22. CT/ US findings suggest cholecystectomy, evaluated by surgery deemed  not a candidate for surgical intervention.

## 2020-06-04 NOTE — ED ADULT NURSE NOTE - OBJECTIVE STATEMENT
pt presents to ED c/o abdominal pain, nausea and vomiting and weakness. Pt also noted to be febrile in triage

## 2020-06-04 NOTE — H&P ADULT - ATTENDING COMMENTS
Patient seen and examined independently. Agree with resident note/ history / physical exam and plan of care with following exceptions/additions/updates. Case discussed with patient/pt decision maker, house-staff and nursing.     pt is feeling better    Vital Signs Last 24 Hrs  T(C): 37.2 (05 Jun 2020 07:33), Max: 38.1 (04 Jun 2020 17:01)  T(F): 98.9 (05 Jun 2020 07:33), Max: 100.5 (04 Jun 2020 17:01)  HR: 87 (05 Jun 2020 07:33) (87 - 105)  BP: 110/53 (05 Jun 2020 07:33) (109/69 - 179/76)  BP(mean): --  RR: 18 (05 Jun 2020 07:33) (18 - 20)  SpO2: 97% (05 Jun 2020 07:33) (95% - 100%)    Physical exam:   constitutional NAD, AAOX3, Respiratory  lungs CTA, CVS heart RRR, GI: abdomen mild ruq tenderness, no rebound, skin: intact  neuro exam non focal.                           10.5   21.02 )-----------( 186      ( 05 Jun 2020 06:41 )             33.7   06-05    143  |  103  |  34<H>  ----------------------------<  151<H>  3.1<L>   |  26  |  1.0    Ca    8.1<L>      05 Jun 2020 06:41    TPro  5.5<L>  /  Alb  2.8<L>  /  TBili  0.6  /  DBili  x   /  AST  36  /  ALT  28  /  AlkPhos  87  06-05    a/p  #sepsis due to acute cholecystitis,   dw surgery, rec IR perc cholecystostomy   cont cipro and flagyl   NPO for now,  IVF, maintenance fluid (d5ns 50cc/h) while npo and has sepsis , reevaluate rate as per pt symptoms and labs. monitor cbc and cmp     #hypokalemia, replace k and mag ( keep K>4 and Mg>2)     PAST MEDICAL & SURGICAL HISTORY: cont meds  High cholesterol  CHF (congestive heart failure)  HTN (hypertension)    Full code Patient seen and examined independently. Agree with resident note/ history / physical exam and plan of care with following exceptions/additions/updates. Case discussed with patient/pt decision maker, house-staff and nursing.     pt is feeling better    Vital Signs Last 24 Hrs  T(C): 37.2 (05 Jun 2020 07:33), Max: 38.1 (04 Jun 2020 17:01)  T(F): 98.9 (05 Jun 2020 07:33), Max: 100.5 (04 Jun 2020 17:01)  HR: 87 (05 Jun 2020 07:33) (87 - 105)  BP: 110/53 (05 Jun 2020 07:33) (109/69 - 179/76)  BP(mean): --  RR: 18 (05 Jun 2020 07:33) (18 - 20)  SpO2: 97% (05 Jun 2020 07:33) (95% - 100%)    Physical exam:   constitutional NAD, AAOX3, Respiratory  lungs CTA, CVS heart RRR, GI: abdomen mild ruq tenderness, no rebound, skin: intact  neuro exam non focal.                           10.5   21.02 )-----------( 186      ( 05 Jun 2020 06:41 )             33.7   06-05    143  |  103  |  34<H>  ----------------------------<  151<H>  3.1<L>   |  26  |  1.0    Ca    8.1<L>      05 Jun 2020 06:41    TPro  5.5<L>  /  Alb  2.8<L>  /  TBili  0.6  /  DBili  x   /  AST  36  /  ALT  28  /  AlkPhos  87  06-05    a/p  #sepsis due to acute cholecystitis,   dw surgery, rec IR perc cholecystostomy   cont cipro and flagyl   NPO for now,  IVF, maintenance fluid (d5ns 50cc/h) while npo and has sepsis , reevaluate rate as per pt symptoms and labs. monitor cbc and cmp     #hypokalemia, replace k and mag ( keep K>4 and Mg>2)     PAST MEDICAL & SURGICAL HISTORY: cont meds  High cholesterol  CHF (congestive heart failure) chronic HFpEF   HTN (hypertension)    Full code

## 2020-06-04 NOTE — H&P ADULT - NSHPLABSRESULTS_GEN_ALL_CORE
12.4   21.22 )-----------( 208      ( 04 Jun 2020 17:47 )             37.5       06-04    140  |  95<L>  |  47<H>  ----------------------------<  165<H>  3.4<L>   |  30  |  1.4    Ca    9.1      04 Jun 2020 17:47    TPro  6.5  /  Alb  3.2<L>  /  TBili  0.7  /  DBili  x   /  AST  45<H>  /  ALT  33  /  AlkPhos  93  06-04                  PT/INR - ( 04 Jun 2020 17:47 )   PT: 13.70 sec;   INR: 1.19 ratio             Lactate Trend      CARDIAC MARKERS ( 04 Jun 2020 17:47 )  x     / <0.01 ng/mL / x     / x     / x            CAPILLARY BLOOD GLUCOSE      < from: CT Abdomen and Pelvis w/ IV Cont (06.04.20 @ 19:00) >    1.  Acute cholecystitis.    2.  Colonic diverticulosis without evidence of diverticulitis.    < end of copied text >    < from: US Abdomen Limited (06.04.20 @ 20:29) >    Cholelithiasis with gallbladder wall thickening compatible with acute cholecystitis (comparison made to recent CT which confirms the findings)    < end of copied text >

## 2020-06-05 LAB
ALBUMIN SERPL ELPH-MCNC: 2.8 G/DL — LOW (ref 3.5–5.2)
ALP SERPL-CCNC: 87 U/L — SIGNIFICANT CHANGE UP (ref 30–115)
ALT FLD-CCNC: 28 U/L — SIGNIFICANT CHANGE UP (ref 0–41)
ANION GAP SERPL CALC-SCNC: 11 MMOL/L — SIGNIFICANT CHANGE UP (ref 7–14)
ANION GAP SERPL CALC-SCNC: 14 MMOL/L — SIGNIFICANT CHANGE UP (ref 7–14)
APTT BLD: 30 SEC — SIGNIFICANT CHANGE UP (ref 27–39.2)
AST SERPL-CCNC: 36 U/L — SIGNIFICANT CHANGE UP (ref 0–41)
BASOPHILS # BLD AUTO: 0.03 K/UL — SIGNIFICANT CHANGE UP (ref 0–0.2)
BASOPHILS NFR BLD AUTO: 0.1 % — SIGNIFICANT CHANGE UP (ref 0–1)
BILIRUB SERPL-MCNC: 0.6 MG/DL — SIGNIFICANT CHANGE UP (ref 0.2–1.2)
BUN SERPL-MCNC: 33 MG/DL — HIGH (ref 10–20)
BUN SERPL-MCNC: 34 MG/DL — HIGH (ref 10–20)
CALCIUM SERPL-MCNC: 8.1 MG/DL — LOW (ref 8.5–10.1)
CALCIUM SERPL-MCNC: 8.2 MG/DL — LOW (ref 8.5–10.1)
CHLORIDE SERPL-SCNC: 101 MMOL/L — SIGNIFICANT CHANGE UP (ref 98–110)
CHLORIDE SERPL-SCNC: 103 MMOL/L — SIGNIFICANT CHANGE UP (ref 98–110)
CO2 SERPL-SCNC: 26 MMOL/L — SIGNIFICANT CHANGE UP (ref 17–32)
CO2 SERPL-SCNC: 28 MMOL/L — SIGNIFICANT CHANGE UP (ref 17–32)
CREAT SERPL-MCNC: 0.9 MG/DL — SIGNIFICANT CHANGE UP (ref 0.7–1.5)
CREAT SERPL-MCNC: 1 MG/DL — SIGNIFICANT CHANGE UP (ref 0.7–1.5)
EOSINOPHIL # BLD AUTO: 0.11 K/UL — SIGNIFICANT CHANGE UP (ref 0–0.7)
EOSINOPHIL NFR BLD AUTO: 0.5 % — SIGNIFICANT CHANGE UP (ref 0–8)
GLUCOSE SERPL-MCNC: 130 MG/DL — HIGH (ref 70–99)
GLUCOSE SERPL-MCNC: 151 MG/DL — HIGH (ref 70–99)
HCT VFR BLD CALC: 33.7 % — LOW (ref 37–47)
HGB BLD-MCNC: 10.5 G/DL — LOW (ref 12–16)
IMM GRANULOCYTES NFR BLD AUTO: 0.8 % — HIGH (ref 0.1–0.3)
INR BLD: 1.23 RATIO — SIGNIFICANT CHANGE UP (ref 0.65–1.3)
LACTATE SERPL-SCNC: 1.6 MMOL/L — SIGNIFICANT CHANGE UP (ref 0.7–2)
LYMPHOCYTES # BLD AUTO: 0.78 K/UL — LOW (ref 1.2–3.4)
LYMPHOCYTES # BLD AUTO: 3.7 % — LOW (ref 20.5–51.1)
MCHC RBC-ENTMCNC: 27.8 PG — SIGNIFICANT CHANGE UP (ref 27–31)
MCHC RBC-ENTMCNC: 31.2 G/DL — LOW (ref 32–37)
MCV RBC AUTO: 89.2 FL — SIGNIFICANT CHANGE UP (ref 81–99)
MONOCYTES # BLD AUTO: 2.63 K/UL — HIGH (ref 0.1–0.6)
MONOCYTES NFR BLD AUTO: 12.5 % — HIGH (ref 1.7–9.3)
NEUTROPHILS # BLD AUTO: 17.31 K/UL — HIGH (ref 1.4–6.5)
NEUTROPHILS NFR BLD AUTO: 82.4 % — HIGH (ref 42.2–75.2)
NRBC # BLD: 0 /100 WBCS — SIGNIFICANT CHANGE UP (ref 0–0)
PLATELET # BLD AUTO: 186 K/UL — SIGNIFICANT CHANGE UP (ref 130–400)
POTASSIUM SERPL-MCNC: 3.1 MMOL/L — LOW (ref 3.5–5)
POTASSIUM SERPL-MCNC: 3.4 MMOL/L — LOW (ref 3.5–5)
POTASSIUM SERPL-SCNC: 3.1 MMOL/L — LOW (ref 3.5–5)
POTASSIUM SERPL-SCNC: 3.4 MMOL/L — LOW (ref 3.5–5)
PROT SERPL-MCNC: 5.5 G/DL — LOW (ref 6–8)
PROTHROM AB SERPL-ACNC: 14.1 SEC — HIGH (ref 9.95–12.87)
RBC # BLD: 3.78 M/UL — LOW (ref 4.2–5.4)
RBC # FLD: 14.9 % — HIGH (ref 11.5–14.5)
SARS-COV-2 RNA SPEC QL NAA+PROBE: SIGNIFICANT CHANGE UP
SODIUM SERPL-SCNC: 140 MMOL/L — SIGNIFICANT CHANGE UP (ref 135–146)
SODIUM SERPL-SCNC: 143 MMOL/L — SIGNIFICANT CHANGE UP (ref 135–146)
WBC # BLD: 21.02 K/UL — HIGH (ref 4.8–10.8)
WBC # FLD AUTO: 21.02 K/UL — HIGH (ref 4.8–10.8)

## 2020-06-05 PROCEDURE — 47490 INCISION OF GALLBLADDER: CPT

## 2020-06-05 PROCEDURE — 99223 1ST HOSP IP/OBS HIGH 75: CPT

## 2020-06-05 PROCEDURE — 99152 MOD SED SAME PHYS/QHP 5/>YRS: CPT

## 2020-06-05 RX ORDER — POTASSIUM CHLORIDE 20 MEQ
20 PACKET (EA) ORAL ONCE
Refills: 0 | Status: COMPLETED | OUTPATIENT
Start: 2020-06-05 | End: 2020-06-05

## 2020-06-05 RX ORDER — ENOXAPARIN SODIUM 100 MG/ML
40 INJECTION SUBCUTANEOUS AT BEDTIME
Refills: 0 | Status: DISCONTINUED | OUTPATIENT
Start: 2020-06-05 | End: 2020-06-09

## 2020-06-05 RX ORDER — METRONIDAZOLE 500 MG
TABLET ORAL
Refills: 0 | Status: DISCONTINUED | OUTPATIENT
Start: 2020-06-05 | End: 2020-06-06

## 2020-06-05 RX ORDER — CIPROFLOXACIN LACTATE 400MG/40ML
400 VIAL (ML) INTRAVENOUS ONCE
Refills: 0 | Status: COMPLETED | OUTPATIENT
Start: 2020-06-05 | End: 2020-06-05

## 2020-06-05 RX ORDER — METRONIDAZOLE 500 MG
500 TABLET ORAL EVERY 8 HOURS
Refills: 0 | Status: DISCONTINUED | OUTPATIENT
Start: 2020-06-05 | End: 2020-06-06

## 2020-06-05 RX ORDER — CIPROFLOXACIN LACTATE 400MG/40ML
400 VIAL (ML) INTRAVENOUS EVERY 12 HOURS
Refills: 0 | Status: DISCONTINUED | OUTPATIENT
Start: 2020-06-05 | End: 2020-06-06

## 2020-06-05 RX ORDER — POTASSIUM CHLORIDE 20 MEQ
40 PACKET (EA) ORAL ONCE
Refills: 0 | Status: COMPLETED | OUTPATIENT
Start: 2020-06-05 | End: 2020-06-05

## 2020-06-05 RX ORDER — CIPROFLOXACIN LACTATE 400MG/40ML
VIAL (ML) INTRAVENOUS
Refills: 0 | Status: DISCONTINUED | OUTPATIENT
Start: 2020-06-05 | End: 2020-06-06

## 2020-06-05 RX ORDER — METRONIDAZOLE 500 MG
500 TABLET ORAL ONCE
Refills: 0 | Status: COMPLETED | OUTPATIENT
Start: 2020-06-05 | End: 2020-06-05

## 2020-06-05 RX ADMIN — ENOXAPARIN SODIUM 40 MILLIGRAM(S): 100 INJECTION SUBCUTANEOUS at 21:31

## 2020-06-05 RX ADMIN — SENNA PLUS 2 TABLET(S): 8.6 TABLET ORAL at 21:32

## 2020-06-05 RX ADMIN — Medication 325 MILLIGRAM(S): at 13:41

## 2020-06-05 RX ADMIN — Medication 25 MILLIGRAM(S): at 05:24

## 2020-06-05 RX ADMIN — Medication 40 MILLIGRAM(S): at 00:34

## 2020-06-05 RX ADMIN — Medication 200 MILLIGRAM(S): at 17:36

## 2020-06-05 RX ADMIN — Medication 5000 UNIT(S): at 13:42

## 2020-06-05 RX ADMIN — Medication 100 MILLIGRAM(S): at 02:36

## 2020-06-05 RX ADMIN — Medication 100 MILLIGRAM(S): at 13:41

## 2020-06-05 RX ADMIN — Medication 100 MILLIGRAM(S): at 21:31

## 2020-06-05 RX ADMIN — Medication 40 MILLIGRAM(S): at 05:24

## 2020-06-05 RX ADMIN — Medication 40 MILLIEQUIVALENT(S): at 09:17

## 2020-06-05 RX ADMIN — Medication 50 MILLIEQUIVALENT(S): at 09:17

## 2020-06-05 RX ADMIN — ATORVASTATIN CALCIUM 40 MILLIGRAM(S): 80 TABLET, FILM COATED ORAL at 13:44

## 2020-06-05 RX ADMIN — Medication 1 MILLIGRAM(S): at 13:41

## 2020-06-05 RX ADMIN — Medication 100 MILLIGRAM(S): at 05:24

## 2020-06-05 RX ADMIN — Medication 200 MILLIGRAM(S): at 02:36

## 2020-06-05 NOTE — PROGRESS NOTE ADULT - SUBJECTIVE AND OBJECTIVE BOX
DAILY PROGRESS NOTE  ===========================================================    Patient Information:  CHINA BAÑUELOS  /  87y  /  Female  /  MRN#: [id number]    Hospital Day: 1d     |:::::::::::::::::::::::::::| SUBJECTIVE |:::::::::::::::::::::::::::|    OVERNIGHT EVENTS: None reported.  TODAY: Patient was seen today at bedside. The patient is still experiencing pain in her RUQ, but is tolerable. She has some pain with movement and if the area is touched. Review of systems is otherwise negative.    |:::::::::::::::::::::::::::| OBJECTIVE |:::::::::::::::::::::::::::|    VITAL SIGNS: Last 24 Hours  T(C): 37.2 (05 Jun 2020 07:33), Max: 38.1 (04 Jun 2020 17:01)  T(F): 98.9 (05 Jun 2020 07:33), Max: 100.5 (04 Jun 2020 17:01)  HR: 87 (05 Jun 2020 07:33) (87 - 105)  BP: 110/53 (05 Jun 2020 07:33) (109/69 - 179/76)  BP(mean): --  RR: 18 (05 Jun 2020 07:33) (18 - 20)  SpO2: 97% (05 Jun 2020 07:33) (95% - 100%)    PHYSICAL EXAM:  GENERAL:   Awake, alert; NAD.  HEENT:  Head NC/AT; Conjunctivae pink, Sclera anicteric; Oral mucosa moist.  CARDIO:   Regular rate; Regular rhythm; S1 & S2.  RESP:   No rales, wheezing, or rhonchi appreciated.  GI:   Soft; Mild tenderness in the RUQ only without radiation; Some guarding; ND; BS; No rebound tenderness.  EXT:   Strength UE and LE in tact; No edema.   SKIN:   Intact.    LAB RESULTS:                        10.5   21.02 )-----------( 186      ( 05 Jun 2020 06:41 )             33.7     143  |  103  |  34<H>  ----------------------------<  151<H>  3.1<L>   |  26  |  1.0    Ca    8.1<L>        TPro  5.5<L>  /  Alb  2.8<L>  /  TBili  0.6  /  DBili  x   /  AST  36  /  ALT  28  /  AlkPhos  87     PT/INR - ( 05 Jun 2020 06:41 )   PT: 14.10 sec;   INR: 1.23 ratio    PTT - ( 05 Jun 2020 06:41 )  PTT:30.0 sec    Lactate, Blood: 1.6 mmol/L (06-05-20 @ 06:41)  Troponin T, Serum: <0.01 ng/mL (06-04-20 @ 17:47)    CARDIAC MARKERS ( 04 Jun 2020 17:47 )  x     / <0.01 ng/mL / x     / x     / x          MICROBIOLOGY:    RADIOLOGY:    ALLERGIES:  cefepime (Unknown)  mavis (Rash)  peanuts (Rash)  varicella virus vaccine (Unknown)      =========================================================== DAILY PROGRESS NOTE  ===========================================================    Patient Information:  CHINA BAÑUELOS  /  87y  /  Female  /  MRN#: [id number]    Hospital Day: 1d     |:::::::::::::::::::::::::::| SUBJECTIVE |:::::::::::::::::::::::::::|    OVERNIGHT EVENTS: None reported.  TODAY: Patient was seen today at bedside. The patient is still experiencing pain in her RUQ, but is tolerable. She has some pain with movement and if the area is touched. Review of systems is otherwise negative.    |:::::::::::::::::::::::::::| OBJECTIVE |:::::::::::::::::::::::::::|    VITAL SIGNS: Last 24 Hours  T(C): 37.2 (05 Jun 2020 07:33), Max: 38.1 (04 Jun 2020 17:01)  T(F): 98.9 (05 Jun 2020 07:33), Max: 100.5 (04 Jun 2020 17:01)  HR: 87 (05 Jun 2020 07:33) (87 - 105)  BP: 110/53 (05 Jun 2020 07:33) (109/69 - 179/76)  BP(mean): --  RR: 18 (05 Jun 2020 07:33) (18 - 20)  SpO2: 97% (05 Jun 2020 07:33) (95% - 100%)    PHYSICAL EXAM:  GENERAL:   Awake, alert; NAD.  HEENT:  Head NC/AT; Conjunctivae pink, Sclera anicteric; Oral mucosa moist.  CARDIO:   Regular rate; Regular rhythm; S1 & S2.  RESP:   No rales, wheezing, or rhonchi appreciated.  GI:   Soft; Mild tenderness in the RUQ only without radiation; Some guarding; ND; BS; No rebound tenderness.  EXT:   Strength UE and LE in tact; No edema.   SKIN:   Intact.    LAB RESULTS:                        10.5   21.02 )-----------( 186      ( 05 Jun 2020 06:41 )             33.7     143  |  103  |  34<H>  ----------------------------<  151<H>  3.1<L>   |  26  |  1.0    Ca    8.1<L>        TPro  5.5<L>  /  Alb  2.8<L>  /  TBili  0.6  /  DBili  x   /  AST  36  /  ALT  28  /  AlkPhos  87     PT/INR - ( 05 Jun 2020 06:41 )   PT: 14.10 sec;   INR: 1.23 ratio    PTT - ( 05 Jun 2020 06:41 )  PTT:30.0 sec    Lactate, Blood: 1.6 mmol/L (06-05-20 @ 06:41)  Troponin T, Serum: <0.01 ng/mL (06-04-20 @ 17:47)    CARDIAC MARKERS ( 04 Jun 2020 17:47 )  x     / <0.01 ng/mL / x     / x     / x          MICROBIOLOGY:  Pending    RADIOLOGY:  Reviewed    ALLERGIES:  cefepime (Unknown)  mavis (Rash)  peanuts (Rash)  varicella virus vaccine (Unknown)    ===========================================================

## 2020-06-05 NOTE — PROGRESS NOTE ADULT - SUBJECTIVE AND OBJECTIVE BOX
GENERAL SURGERY PROGRESS NOTE     SARAMDCHINA ESPARZA  Female  Hospital day: 2d    OVERNIGHT EVENTS: no acute events overnight. WBC stable at 21, patient afebrile, no nausea or vomiting    T(F): 98.9 (06-05-20 @ 07:33), Max: 100.5 (06-04-20 @ 17:01)  HR: 87 (06-05-20 @ 07:33) (87 - 105)  BP: 110/53 (06-05-20 @ 07:33) (109/69 - 179/76)  RR: 18 (06-05-20 @ 07:33) (18 - 20)  SpO2: 97% (06-05-20 @ 07:33) (95% - 100%)    DIET/FLUIDS: cholecalciferol 5000 Unit(s) Oral daily  ferrous    sulfate 325 milliGRAM(s) Oral daily  folic acid 1 milliGRAM(s) Oral daily    ABx: ciprofloxacin   IVPB 400 milliGRAM(s) IV Intermittent every 12 hours  ciprofloxacin   IVPB      metroNIDAZOLE  IVPB 500 milliGRAM(s) IV Intermittent every 8 hours  metroNIDAZOLE  IVPB        PHYSICAL EXAM:  GENERAL: NAD, well-appearing  CHEST/LUNG: Bilateral expiratory wheezes  HEART: Regular rate and rhythm  ABDOMEN: Soft, mild RUQ tenderness, Nondistended;   EXTREMITIES:  No clubbing, cyanosis, or edema    LABS:               10.5   21.02 )-----------( 186      ( 05 Jun 2020 06:41 )             33.7       Auto Neutrophil %: 82.4 % (06-05-20 @ 06:41)  Auto Immature Granulocyte %: 0.8 % (06-05-20 @ 06:41)  Auto Neutrophil %: 88.7 % (06-04-20 @ 17:47)  Band Neutrophils %: 4.3 % (06-04-20 @ 17:47)    06-05    143  |  103  |  34<H>  ----------------------------<  151<H>  3.1<L>   |  26  |  1.0    Calcium, Total Serum: 8.1 mg/dL (06-05-20 @ 06:41)    LFTs:             5.5  | 0.6  | 36       ------------------[87      ( 05 Jun 2020 06:41 )  2.8  | x    | 28          Lactate, Blood: 1.6 mmol/L (06-05-20 @ 06:41)  Blood Gas Venous - Lactate: 1.2 mmoL/L (06-04-20 @ 18:09)    Coags:     14.10  ----< 1.23    ( 05 Jun 2020 06:41 )     30.0      CARDIAC MARKERS ( 04 Jun 2020 17:47 )  x     / <0.01 ng/mL / x     / x     / x

## 2020-06-05 NOTE — PROGRESS NOTE ADULT - SUBJECTIVE AND OBJECTIVE BOX
INTERVENTIONAL RADIOLOGY BRIEF-OPERATIVE NOTE    Procedure: percutaneous cholecystectomy    Pre-Op Diagnosis: cholecysititis    Post-Op Diagnosis: same    Attending: Marco Spencer  Resident: Henri Valente    Anesthesia (type):  [ ] General Anesthesia  [ ] Sedation  [ ] Spinal Anesthesia  [x ] Local/Regional    Contrast: none    Estimated Blood Loss: <5ml    Condition:   [ ] Critical  [ ] Serious  [ ] Fair   [x ] Good    Findings/Follow up Plan of Care: percutaneous cholecystectomy performed under CT guidance. foul smelling yellow billary sludge was aspirated. pigtail catheter drain was placed.    Specimens Removed: none    Implants: none    Complications: none    Disposition: transfer back to the floor      Please call Interventional Radiology [phone] with any questions, concerns, or issues.

## 2020-06-05 NOTE — PROGRESS NOTE ADULT - ASSESSMENT
Sepsis secondary to acute cholecystitis  - Sepsis present on admission with WBC and Fever (Fever resolved)  - Started on Ciproflagyl/Flagyll; Will continue given patient looks well clinically  - Surgery consulted and patient is not a candidate from surgical intervention  - IR consulted and planning for percutaneous cholecystostomy tube placement; Will go today  - Maintain NPO status; Unclear when going so will give 1L of IVF over hours  - Follow up Blood cultures    Hx of CAD; s/p CABG; Hx of HFpEF  - Continue home Medications  - Continue Metolazone/Lasix combo; Giving fluids slowly as above mentioned  - Follow up daily weights and I+O; Avoid salt when diet resumed  - Cardiology: Dr. Mancini    Hx of Iron Deficient Anemia: Continue ferrous sulfate  Hx of Constipation: Continue hjome Senna; Miralax PRN  Hx of Osteoporosis: Bisphosphonate weekly as OP  Hx of Folate and B12 deficiency: Continue supplementation    GI ppx:   Not indicated  DVT ppx:   Lovenox 40mg SubQ  Activity:   As Tolerated   DISPO:  Patient to be discharged when condition(s) optimized.    CODE STATUS:   FULL Sepsis secondary to acute cholecystitis  - Sepsis present on admission with WBC and Fever (Fever resolved)  - Started on Ciproflagyl/Flagyll; Will continue given patient looks well clinically  - Surgery consulted and patient is not a candidate from surgical intervention  - IR consulted and planning for percutaneous cholecystostomy tube placement; Will go today  - Maintain NPO status; Unclear when going so will give 1L of IVF over hours  - Follow up Blood cultures    Hypokalemia, Mild  - Likely medication-induced, now NPO and without supplementation  - Will replete throughout day and follow up with metabolic panel later  - May benefit from K+ 20 meq PO daily upon discharge    Hx of CAD; s/p CABG; Hx of HFpEF  - Continue home Medications  - Continue Metolazone/Lasix combo; Giving fluids slowly as above mentioned  - Follow up daily weights and I+O; Avoid salt when diet resumed  - Cardiology: Dr. Mancini    Hx of Iron Deficient Anemia: Continue ferrous sulfate  Hx of Constipation: Continue hjome Senna; Miralax PRN  Hx of Osteoporosis: Bisphosphonate weekly as OP  Hx of Folate and B12 deficiency: Continue supplementation    GI ppx:   Not indicated  DVT ppx:   Lovenox 40mg SubQ  Activity:   As Tolerated   DISPO:  Patient to be discharged when condition(s) optimized.    CODE STATUS:   FULL

## 2020-06-05 NOTE — CONSULT NOTE ADULT - SUBJECTIVE AND OBJECTIVE BOX
INTERVENTIONAL RADIOLOGY CONSULT:     Procedure Requested: percutaneous cholecystostomy tube placement    HPI:  87 year old female with a PMH of HTN, CHF, CAD s/p CABG, and osteoporosis presents with abdominal pain for 4 days duration.  patient was doing well until 4 days prior to presentation when started to RUQ pain, moderate in severity 6/10, no radiation episodic, associated with loss of appetite nausea with no vomiting, no diarrhea, no melena, no hematemesis no BRBPR, today she spiked fever, no chills.   denies chest pain, cough, or SOB.   In ED hemodynamically stable febrile 100.5  WBC of 21.22. CT/ US findings suggest cholecystectomy, evaluated by surgery deemed  not a candidate for surgical intervention. (04 Jun 2020 22:19)      PAST MEDICAL & SURGICAL HISTORY:  High cholesterol  CHF (congestive heart failure)  HTN (hypertension)  No pertinent past medical history  No significant past surgical history      MEDICATIONS  (STANDING):  atorvastatin Oral Tab/Cap - Peds 40 milliGRAM(s) Oral daily  cholecalciferol 5000 Unit(s) Oral daily  ciprofloxacin   IVPB 400 milliGRAM(s) IV Intermittent every 12 hours  ciprofloxacin   IVPB      ferrous    sulfate 325 milliGRAM(s) Oral daily  folic acid 1 milliGRAM(s) Oral daily  furosemide    Tablet 40 milliGRAM(s) Oral daily  metolazone 2.5 milliGRAM(s) Oral daily  metoprolol succinate ER 25 milliGRAM(s) Oral daily  metroNIDAZOLE  IVPB 500 milliGRAM(s) IV Intermittent every 8 hours  metroNIDAZOLE  IVPB      potassium chloride   Powder 40 milliEquivalent(s) Oral once  potassium chloride  20 mEq/100 mL IVPB 20 milliEquivalent(s) IV Intermittent once  senna 2 Tablet(s) Oral at bedtime    MEDICATIONS  (PRN):      Allergies    cefepime (Unknown)  mavis (Rash)  peanuts (Rash)  varicella virus vaccine (Unknown)    Intolerances    FAMILY HISTORY:  No pertinent family history in first degree relatives      Physical Exam:   Vital Signs Last 24 Hrs  T(C): 37.2 (05 Jun 2020 07:33), Max: 38.1 (04 Jun 2020 17:01)  T(F): 98.9 (05 Jun 2020 07:33), Max: 100.5 (04 Jun 2020 17:01)  HR: 87 (05 Jun 2020 07:33) (87 - 105)  BP: 110/53 (05 Jun 2020 07:33) (109/69 - 179/76)  BP(mean): --  RR: 18 (05 Jun 2020 07:33) (18 - 20)  SpO2: 97% (05 Jun 2020 07:33) (95% - 100%)      Labs:                         10.5   21.02 )-----------( 186      ( 05 Jun 2020 06:41 )             33.7     06-05    143  |  103  |  34<H>  ----------------------------<  151<H>  3.1<L>   |  26  |  1.0    Ca    8.1<L>      05 Jun 2020 06:41    TPro  5.5<L>  /  Alb  2.8<L>  /  TBili  0.6  /  DBili  x   /  AST  36  /  ALT  28  /  AlkPhos  87  06-05    PT/INR - ( 05 Jun 2020 06:41 )   PT: 14.10 sec;   INR: 1.23 ratio         PTT - ( 05 Jun 2020 06:41 )  PTT:30.0 sec    Pertinent labs:                      10.5   21.02 )-----------( 186      ( 05 Jun 2020 06:41 )             33.7       06-05    143  |  103  |  34<H>  ----------------------------<  151<H>  3.1<L>   |  26  |  1.0    Ca    8.1<L>      05 Jun 2020 06:41    TPro  5.5<L>  /  Alb  2.8<L>  /  TBili  0.6  /  DBili  x   /  AST  36  /  ALT  28  /  AlkPhos  87  06-05      PT/INR - ( 05 Jun 2020 06:41 )   PT: 14.10 sec;   INR: 1.23 ratio         PTT - ( 05 Jun 2020 06:41 )  PTT:30.0 sec    Radiology & Additional Studies:     < from: CT Abdomen and Pelvis w/ IV Cont (06.04.20 @ 19:00) >  IMPRESSION:    1.  Acute cholecystitis.    2.  Colonic diverticulosis without evidence of diverticulitis.    < end of copied text >      Radiology imaging reviewed.       ASSESSMENT/ PLAN:   87 year old female with a PMH of HTN, CHF, CAD s/p CABG, and osteoporosis presents with abdominal pain for 4 days duration.  - consulted for percutaneous cholecystostomy tube placement d/t CT findings and clinical presentation  - CT abdomen/pelvis confirms acute cholecystitis   - plan  for image guided perc tony today 6/5   - keep NPO     Thank you for the courtesy of this consult, please call [phone] with any further questions.

## 2020-06-05 NOTE — PROGRESS NOTE ADULT - ASSESSMENT
Assessment:  87 y.o. female with a PMH of HTN, CHF, CAD s/p CABG, and osteoporosis presented to the ER c/o diffuse abdominal pain, nausea, and vomiting for the past 4 days. Acute cholecystitis.     Plan:  - change abx to Zosyn for more broad coverage  - IR consult for perc tony

## 2020-06-06 ENCOUNTER — TRANSCRIPTION ENCOUNTER (OUTPATIENT)
Age: 85
End: 2020-06-06

## 2020-06-06 LAB
ANION GAP SERPL CALC-SCNC: 12 MMOL/L — SIGNIFICANT CHANGE UP (ref 7–14)
BASOPHILS # BLD AUTO: 0.01 K/UL — SIGNIFICANT CHANGE UP (ref 0–0.2)
BASOPHILS NFR BLD AUTO: 0.1 % — SIGNIFICANT CHANGE UP (ref 0–1)
BUN SERPL-MCNC: 33 MG/DL — HIGH (ref 10–20)
CALCIUM SERPL-MCNC: 8.3 MG/DL — LOW (ref 8.5–10.1)
CHLORIDE SERPL-SCNC: 103 MMOL/L — SIGNIFICANT CHANGE UP (ref 98–110)
CO2 SERPL-SCNC: 30 MMOL/L — SIGNIFICANT CHANGE UP (ref 17–32)
CREAT SERPL-MCNC: 0.9 MG/DL — SIGNIFICANT CHANGE UP (ref 0.7–1.5)
EOSINOPHIL # BLD AUTO: 0.25 K/UL — SIGNIFICANT CHANGE UP (ref 0–0.7)
EOSINOPHIL NFR BLD AUTO: 2 % — SIGNIFICANT CHANGE UP (ref 0–8)
GLUCOSE SERPL-MCNC: 121 MG/DL — HIGH (ref 70–99)
GRAM STN FLD: SIGNIFICANT CHANGE UP
HCT VFR BLD CALC: 33 % — LOW (ref 37–47)
HGB BLD-MCNC: 10.2 G/DL — LOW (ref 12–16)
IMM GRANULOCYTES NFR BLD AUTO: 1.5 % — HIGH (ref 0.1–0.3)
LYMPHOCYTES # BLD AUTO: 0.71 K/UL — LOW (ref 1.2–3.4)
LYMPHOCYTES # BLD AUTO: 5.8 % — LOW (ref 20.5–51.1)
MCHC RBC-ENTMCNC: 27.4 PG — SIGNIFICANT CHANGE UP (ref 27–31)
MCHC RBC-ENTMCNC: 30.9 G/DL — LOW (ref 32–37)
MCV RBC AUTO: 88.7 FL — SIGNIFICANT CHANGE UP (ref 81–99)
METHOD TYPE: SIGNIFICANT CHANGE UP
METHOD TYPE: SIGNIFICANT CHANGE UP
MONOCYTES # BLD AUTO: 1.47 K/UL — HIGH (ref 0.1–0.6)
MONOCYTES NFR BLD AUTO: 11.9 % — HIGH (ref 1.7–9.3)
NEUTROPHILS # BLD AUTO: 9.69 K/UL — HIGH (ref 1.4–6.5)
NEUTROPHILS NFR BLD AUTO: 78.7 % — HIGH (ref 42.2–75.2)
NIGHT BLUE STAIN TISS: SIGNIFICANT CHANGE UP
NRBC # BLD: 0 /100 WBCS — SIGNIFICANT CHANGE UP (ref 0–0)
PLATELET # BLD AUTO: 173 K/UL — SIGNIFICANT CHANGE UP (ref 130–400)
POTASSIUM SERPL-MCNC: 3.2 MMOL/L — LOW (ref 3.5–5)
POTASSIUM SERPL-SCNC: 3.2 MMOL/L — LOW (ref 3.5–5)
RBC # BLD: 3.72 M/UL — LOW (ref 4.2–5.4)
RBC # FLD: 15.1 % — HIGH (ref 11.5–14.5)
SODIUM SERPL-SCNC: 145 MMOL/L — SIGNIFICANT CHANGE UP (ref 135–146)
SPECIMEN SOURCE: SIGNIFICANT CHANGE UP
SPECIMEN SOURCE: SIGNIFICANT CHANGE UP
WBC # BLD: 12.32 K/UL — HIGH (ref 4.8–10.8)
WBC # FLD AUTO: 12.32 K/UL — HIGH (ref 4.8–10.8)

## 2020-06-06 PROCEDURE — 99233 SBSQ HOSP IP/OBS HIGH 50: CPT

## 2020-06-06 RX ORDER — POTASSIUM CHLORIDE 20 MEQ
20 PACKET (EA) ORAL
Refills: 0 | Status: COMPLETED | OUTPATIENT
Start: 2020-06-06 | End: 2020-06-06

## 2020-06-06 RX ORDER — POTASSIUM CHLORIDE 20 MEQ
40 PACKET (EA) ORAL EVERY 4 HOURS
Refills: 0 | Status: DISCONTINUED | OUTPATIENT
Start: 2020-06-06 | End: 2020-06-06

## 2020-06-06 RX ORDER — METOCLOPRAMIDE HCL 10 MG
10 TABLET ORAL ONCE
Refills: 0 | Status: COMPLETED | OUTPATIENT
Start: 2020-06-06 | End: 2020-06-06

## 2020-06-06 RX ORDER — MAGNESIUM SULFATE 500 MG/ML
1 VIAL (ML) INJECTION ONCE
Refills: 0 | Status: COMPLETED | OUTPATIENT
Start: 2020-06-06 | End: 2020-06-06

## 2020-06-06 RX ORDER — MEROPENEM 1 G/30ML
500 INJECTION INTRAVENOUS EVERY 8 HOURS
Refills: 0 | Status: DISCONTINUED | OUTPATIENT
Start: 2020-06-06 | End: 2020-06-09

## 2020-06-06 RX ORDER — MEROPENEM 1 G/30ML
INJECTION INTRAVENOUS
Refills: 0 | Status: DISCONTINUED | OUTPATIENT
Start: 2020-06-06 | End: 2020-06-09

## 2020-06-06 RX ORDER — MEROPENEM 1 G/30ML
500 INJECTION INTRAVENOUS ONCE
Refills: 0 | Status: COMPLETED | OUTPATIENT
Start: 2020-06-06 | End: 2020-06-06

## 2020-06-06 RX ADMIN — Medication 10 MILLIGRAM(S): at 05:26

## 2020-06-06 RX ADMIN — Medication 200 MILLIGRAM(S): at 05:27

## 2020-06-06 RX ADMIN — SENNA PLUS 2 TABLET(S): 8.6 TABLET ORAL at 21:21

## 2020-06-06 RX ADMIN — Medication 1 MILLIGRAM(S): at 11:32

## 2020-06-06 RX ADMIN — Medication 100 MILLIGRAM(S): at 05:27

## 2020-06-06 RX ADMIN — Medication 50 MILLIEQUIVALENT(S): at 08:43

## 2020-06-06 RX ADMIN — Medication 50 MILLIEQUIVALENT(S): at 10:41

## 2020-06-06 RX ADMIN — Medication 20 MILLIEQUIVALENT(S): at 00:11

## 2020-06-06 RX ADMIN — MEROPENEM 100 MILLIGRAM(S): 1 INJECTION INTRAVENOUS at 14:13

## 2020-06-06 RX ADMIN — Medication 5000 UNIT(S): at 11:32

## 2020-06-06 RX ADMIN — Medication 50 MILLIEQUIVALENT(S): at 12:41

## 2020-06-06 RX ADMIN — Medication 100 GRAM(S): at 14:13

## 2020-06-06 RX ADMIN — ATORVASTATIN CALCIUM 40 MILLIGRAM(S): 80 TABLET, FILM COATED ORAL at 11:32

## 2020-06-06 RX ADMIN — Medication 325 MILLIGRAM(S): at 11:32

## 2020-06-06 RX ADMIN — MEROPENEM 100 MILLIGRAM(S): 1 INJECTION INTRAVENOUS at 21:22

## 2020-06-06 RX ADMIN — ENOXAPARIN SODIUM 40 MILLIGRAM(S): 100 INJECTION SUBCUTANEOUS at 21:22

## 2020-06-06 RX ADMIN — Medication 40 MILLIGRAM(S): at 05:27

## 2020-06-06 RX ADMIN — Medication 25 MILLIGRAM(S): at 05:27

## 2020-06-06 NOTE — DISCHARGE NOTE NURSING/CASE MANAGEMENT/SOCIAL WORK - PATIENT PORTAL LINK FT
You can access the FollowMyHealth Patient Portal offered by [facility] by registering at the following website: [email or web address]. By joining [facility]’s FollowMyHealth portal, you will also be able to view your health information using other applications (apps) compatible with our system.

## 2020-06-06 NOTE — PROGRESS NOTE ADULT - ASSESSMENT
88 y/o female with acute cholecystitis, s/p percutaneous cholecystotomy by iR 6/5    Plan:   serial abdominal exams  monitor drain output  continue IV antibiotics

## 2020-06-06 NOTE — PROGRESS NOTE ADULT - SUBJECTIVE AND OBJECTIVE BOX
pt seen and examined.   has abd discomfort on the right upper ext.     Vital Signs Last 24 Hrs  T(C): 37.8 (06 Jun 2020 05:34), Max: 37.8 (06 Jun 2020 05:34)  T(F): 100 (06 Jun 2020 05:34), Max: 100 (06 Jun 2020 05:34)  HR: 85 (06 Jun 2020 05:34) (85 - 98)  BP: 126/56 (06 Jun 2020 05:34) (126/56 - 138/66)  RR: 18 (06 Jun 2020 05:34) (18 - 18)  SpO2: 99% (06 Jun 2020 07:40) (94% - 99%)    Physical exam:   constitutional NAD, AAOX3, Respiratory  lungs CTA, CVS heart RRR, GI: abdomen Soft NT, ND, BS+, skin: intact  neuro exam non focal.                  10.2   12.32 )-----------( 173      ( 06 Jun 2020 05:42 )             33.0     06-06    145  |  103  |  33<H>  ----------------------------<  121<H>  3.2<L>   |  30  |  0.9    Ca    8.3<L>      06 Jun 2020 05:42    TPro  5.5<L>  /  Alb  2.8<L>  /  TBili  0.6  /  DBili  x   /  AST  36  /  ALT  28  /  AlkPhos  87  06-05    a/p  #acute cholecystitis, sp perc cholecystostomy, cont abx, fu surgery, fu bc, change abx to sherwin, since she still has fever, and abd pain     #hypokalemia, replace, keep K>4 and Mg>2.     #PAST MEDICAL & SURGICAL HISTORY: stable cont meds  High cholesterol  CHF (congestive heart failure)  HTN (hypertension)  No pertinent past medical history  No significant past surgical history    full code

## 2020-06-06 NOTE — PROGRESS NOTE ADULT - SUBJECTIVE AND OBJECTIVE BOX
CHINA BAÑUELOS  87y Female   [id number]    Procedure/dx: acute cholecystitis, s/p percutaneous choly by iR 6/5  Events of the Last 24h: s/p percutaneous choly by iR 6/5    Patient is a 87y old  Female who presents with a chief complaint of abdominal pain (05 Jun 2020 13:05)    PAST MEDICAL & SURGICAL HISTORY:  High cholesterol  CHF (congestive heart failure)  HTN (hypertension)  No pertinent past medical history  No significant past surgical history      Vital Signs Last 24 Hrs  T(C): 36.7 (05 Jun 2020 20:02), Max: 37.2 (05 Jun 2020 07:33)  T(F): 98 (05 Jun 2020 20:02), Max: 98.9 (05 Jun 2020 07:33)  HR: 88 (05 Jun 2020 20:02) (87 - 98)  BP: 129/55 (05 Jun 2020 20:02) (110/53 - 138/66)  BP(mean): --  RR: 18 (05 Jun 2020 20:02) (18 - 20)  SpO2: 95% (05 Jun 2020 19:57) (94% - 100%)        Diet, Clear Liquid (06-05-20 @ 17:01)      I&O's Detail    05 Jun 2020 07:01  -  06 Jun 2020 00:37  --------------------------------------------------------  IN:  Total IN: 0 mL    OUT:    Drain: 150 mL  Total OUT: 150 mL    Total NET: -150 mL          MEDICATIONS  (STANDING):  atorvastatin Oral Tab/Cap - Peds 40 milliGRAM(s) Oral daily  cholecalciferol 5000 Unit(s) Oral daily  ciprofloxacin   IVPB 400 milliGRAM(s) IV Intermittent every 12 hours  ciprofloxacin   IVPB      enoxaparin Injectable 40 milliGRAM(s) SubCutaneous at bedtime  ferrous    sulfate 325 milliGRAM(s) Oral daily  folic acid 1 milliGRAM(s) Oral daily  furosemide    Tablet 40 milliGRAM(s) Oral daily  metolazone 2.5 milliGRAM(s) Oral daily  metoprolol succinate ER 25 milliGRAM(s) Oral daily  metroNIDAZOLE  IVPB 500 milliGRAM(s) IV Intermittent every 8 hours  metroNIDAZOLE  IVPB      senna 2 Tablet(s) Oral at bedtime    MEDICATIONS  (PRN):      PHYSICAL EXAM:  GENERAL: NAD,  ABDOMEN: Soft,, Nondistended; some RUQ tenderness, drain in place with brown output      LABS:                         10.5   21.02 )-----------( 186      ( 05 Jun 2020 06:41 )             33.7        06-05    140  |  101  |  33<H>  ----------------------------<  130<H>  3.4<L>   |  28  |  0.9    Ca    8.2<L>      05 Jun 2020 22:16    TPro  5.5<L>  /  Alb  2.8<L>  /  TBili  0.6  /  DBili  x   /  AST  36  /  ALT  28  /  AlkPhos  87  06-05    LIVER FUNCTIONS - ( 05 Jun 2020 06:41 )  Alb: 2.8 g/dL / Pro: 5.5 g/dL / ALK PHOS: 87 U/L / ALT: 28 U/L / AST: 36 U/L / GGT: x           PT/INR - ( 05 Jun 2020 06:41 )   PT: 14.10 sec;   INR: 1.23 ratio         PTT - ( 05 Jun 2020 06:41 )  PTT:30.0 sec  CARDIAC MARKERS ( 04 Jun 2020 17:47 )  x     / <0.01 ng/mL / x     / x     / x          IMAGING: CHINA BAÑUELOS  87y Female   [id number]    Procedure/dx: acute cholecystitis, s/p percutaneous choly by iR 6/5  Events of the Last 24h: s/p percutaneous choly by iR 6/5    Patient is a 87y old  Female who presents with a chief complaint of abdominal pain (05 Jun 2020 13:05)    PAST MEDICAL & SURGICAL HISTORY:  High cholesterol  CHF (congestive heart failure)  HTN (hypertension)  No pertinent past medical history  No significant past surgical history      Vital Signs Last 24 Hrs  T(C): 36.7 (05 Jun 2020 20:02), Max: 37.2 (05 Jun 2020 07:33)  T(F): 98 (05 Jun 2020 20:02), Max: 98.9 (05 Jun 2020 07:33)  HR: 88 (05 Jun 2020 20:02) (87 - 98)  BP: 129/55 (05 Jun 2020 20:02) (110/53 - 138/66)  BP(mean): --  RR: 18 (05 Jun 2020 20:02) (18 - 20)  SpO2: 95% (05 Jun 2020 19:57) (94% - 100%)        Diet, Clear Liquid (06-05-20 @ 17:01)      I&O's Detail    05 Jun 2020 07:01  -  06 Jun 2020 00:37  --------------------------------------------------------  IN:  Total IN: 0 mL    OUT:    Drain: 150 mL  Total OUT: 150 mL    Total NET: -150 mL          MEDICATIONS  (STANDING):  atorvastatin Oral Tab/Cap - Peds 40 milliGRAM(s) Oral daily  cholecalciferol 5000 Unit(s) Oral daily  ciprofloxacin   IVPB 400 milliGRAM(s) IV Intermittent every 12 hours  ciprofloxacin   IVPB      enoxaparin Injectable 40 milliGRAM(s) SubCutaneous at bedtime  ferrous    sulfate 325 milliGRAM(s) Oral daily  folic acid 1 milliGRAM(s) Oral daily  furosemide    Tablet 40 milliGRAM(s) Oral daily  metolazone 2.5 milliGRAM(s) Oral daily  metoprolol succinate ER 25 milliGRAM(s) Oral daily  metroNIDAZOLE  IVPB 500 milliGRAM(s) IV Intermittent every 8 hours  metroNIDAZOLE  IVPB      senna 2 Tablet(s) Oral at bedtime    MEDICATIONS  (PRN):      PHYSICAL EXAM:  GENERAL: NAD,  ABDOMEN: Soft,, Nondistended; some RUQ tenderness, drain in place with biliary output      LABS:                         10.5   21.02 )-----------( 186      ( 05 Jun 2020 06:41 )             33.7        06-05    140  |  101  |  33<H>  ----------------------------<  130<H>  3.4<L>   |  28  |  0.9    Ca    8.2<L>      05 Jun 2020 22:16    TPro  5.5<L>  /  Alb  2.8<L>  /  TBili  0.6  /  DBili  x   /  AST  36  /  ALT  28  /  AlkPhos  87  06-05    LIVER FUNCTIONS - ( 05 Jun 2020 06:41 )  Alb: 2.8 g/dL / Pro: 5.5 g/dL / ALK PHOS: 87 U/L / ALT: 28 U/L / AST: 36 U/L / GGT: x           PT/INR - ( 05 Jun 2020 06:41 )   PT: 14.10 sec;   INR: 1.23 ratio         PTT - ( 05 Jun 2020 06:41 )  PTT:30.0 sec  CARDIAC MARKERS ( 04 Jun 2020 17:47 )  x     / <0.01 ng/mL / x     / x     / x          IMAGING:

## 2020-06-07 LAB
-  AMIKACIN: SIGNIFICANT CHANGE UP
-  AMOXICILLIN/CLAVULANIC ACID: SIGNIFICANT CHANGE UP
-  AMPICILLIN/SULBACTAM: SIGNIFICANT CHANGE UP
-  AMPICILLIN: SIGNIFICANT CHANGE UP
-  AMPICILLIN: SIGNIFICANT CHANGE UP
-  AZTREONAM: SIGNIFICANT CHANGE UP
-  CEFAZOLIN: SIGNIFICANT CHANGE UP
-  CEFEPIME: SIGNIFICANT CHANGE UP
-  CEFOXITIN: SIGNIFICANT CHANGE UP
-  CEFTRIAXONE: SIGNIFICANT CHANGE UP
-  CIPROFLOXACIN: SIGNIFICANT CHANGE UP
-  ERTAPENEM: SIGNIFICANT CHANGE UP
-  GENTAMICIN: SIGNIFICANT CHANGE UP
-  IMIPENEM: SIGNIFICANT CHANGE UP
-  LEVOFLOXACIN: SIGNIFICANT CHANGE UP
-  MEROPENEM: SIGNIFICANT CHANGE UP
-  PIPERACILLIN/TAZOBACTAM: SIGNIFICANT CHANGE UP
-  TETRACYCLINE: SIGNIFICANT CHANGE UP
-  TOBRAMYCIN: SIGNIFICANT CHANGE UP
-  TRIMETHOPRIM/SULFAMETHOXAZOLE: SIGNIFICANT CHANGE UP
-  VANCOMYCIN: SIGNIFICANT CHANGE UP
ALBUMIN SERPL ELPH-MCNC: 2.7 G/DL — LOW (ref 3.5–5.2)
ALP SERPL-CCNC: 99 U/L — SIGNIFICANT CHANGE UP (ref 30–115)
ALT FLD-CCNC: 29 U/L — SIGNIFICANT CHANGE UP (ref 0–41)
ANION GAP SERPL CALC-SCNC: 12 MMOL/L — SIGNIFICANT CHANGE UP (ref 7–14)
AST SERPL-CCNC: 36 U/L — SIGNIFICANT CHANGE UP (ref 0–41)
BASOPHILS # BLD AUTO: 0.09 K/UL — SIGNIFICANT CHANGE UP (ref 0–0.2)
BASOPHILS NFR BLD AUTO: 0.9 % — SIGNIFICANT CHANGE UP (ref 0–1)
BILIRUB SERPL-MCNC: 0.5 MG/DL — SIGNIFICANT CHANGE UP (ref 0.2–1.2)
BUN SERPL-MCNC: 28 MG/DL — HIGH (ref 10–20)
CALCIUM SERPL-MCNC: 8.6 MG/DL — SIGNIFICANT CHANGE UP (ref 8.5–10.1)
CHLORIDE SERPL-SCNC: 101 MMOL/L — SIGNIFICANT CHANGE UP (ref 98–110)
CO2 SERPL-SCNC: 30 MMOL/L — SIGNIFICANT CHANGE UP (ref 17–32)
CREAT SERPL-MCNC: 0.9 MG/DL — SIGNIFICANT CHANGE UP (ref 0.7–1.5)
EOSINOPHIL # BLD AUTO: 0.35 K/UL — SIGNIFICANT CHANGE UP (ref 0–0.7)
EOSINOPHIL NFR BLD AUTO: 3.5 % — SIGNIFICANT CHANGE UP (ref 0–8)
GIANT PLATELETS BLD QL SMEAR: PRESENT — SIGNIFICANT CHANGE UP
GLUCOSE SERPL-MCNC: 116 MG/DL — HIGH (ref 70–99)
HCT VFR BLD CALC: 33.8 % — LOW (ref 37–47)
HGB BLD-MCNC: 10.5 G/DL — LOW (ref 12–16)
LYMPHOCYTES # BLD AUTO: 0.7 K/UL — LOW (ref 1.2–3.4)
LYMPHOCYTES # BLD AUTO: 7 % — LOW (ref 20.5–51.1)
MAGNESIUM SERPL-MCNC: 2.6 MG/DL — HIGH (ref 1.8–2.4)
MANUAL SMEAR VERIFICATION: SIGNIFICANT CHANGE UP
MCHC RBC-ENTMCNC: 27.2 PG — SIGNIFICANT CHANGE UP (ref 27–31)
MCHC RBC-ENTMCNC: 31.1 G/DL — LOW (ref 32–37)
MCV RBC AUTO: 87.6 FL — SIGNIFICANT CHANGE UP (ref 81–99)
MONOCYTES # BLD AUTO: 0.78 K/UL — HIGH (ref 0.1–0.6)
MONOCYTES NFR BLD AUTO: 7.8 % — SIGNIFICANT CHANGE UP (ref 1.7–9.3)
NEUTROPHILS # BLD AUTO: 7.65 K/UL — HIGH (ref 1.4–6.5)
NEUTROPHILS NFR BLD AUTO: 76.5 % — HIGH (ref 42.2–75.2)
PLAT MORPH BLD: NORMAL — SIGNIFICANT CHANGE UP
PLATELET # BLD AUTO: 189 K/UL — SIGNIFICANT CHANGE UP (ref 130–400)
POTASSIUM SERPL-MCNC: 3.4 MMOL/L — LOW (ref 3.5–5)
POTASSIUM SERPL-SCNC: 3.4 MMOL/L — LOW (ref 3.5–5)
PROMYELOCYTES # FLD: 2.6 % — HIGH (ref 0–0)
PROT SERPL-MCNC: 5.4 G/DL — LOW (ref 6–8)
RBC # BLD: 3.86 M/UL — LOW (ref 4.2–5.4)
RBC # FLD: 14.9 % — HIGH (ref 11.5–14.5)
RBC BLD AUTO: NORMAL — SIGNIFICANT CHANGE UP
SODIUM SERPL-SCNC: 143 MMOL/L — SIGNIFICANT CHANGE UP (ref 135–146)
VARIANT LYMPHS # BLD: 1.7 % — SIGNIFICANT CHANGE UP (ref 0–5)
WBC # BLD: 10 K/UL — SIGNIFICANT CHANGE UP (ref 4.8–10.8)
WBC # FLD AUTO: 10 K/UL — SIGNIFICANT CHANGE UP (ref 4.8–10.8)

## 2020-06-07 PROCEDURE — 99233 SBSQ HOSP IP/OBS HIGH 50: CPT

## 2020-06-07 RX ORDER — POTASSIUM CHLORIDE 20 MEQ
40 PACKET (EA) ORAL ONCE
Refills: 0 | Status: COMPLETED | OUTPATIENT
Start: 2020-06-07 | End: 2020-06-07

## 2020-06-07 RX ADMIN — ENOXAPARIN SODIUM 40 MILLIGRAM(S): 100 INJECTION SUBCUTANEOUS at 22:16

## 2020-06-07 RX ADMIN — Medication 25 MILLIGRAM(S): at 05:36

## 2020-06-07 RX ADMIN — MEROPENEM 100 MILLIGRAM(S): 1 INJECTION INTRAVENOUS at 22:18

## 2020-06-07 RX ADMIN — SENNA PLUS 2 TABLET(S): 8.6 TABLET ORAL at 22:16

## 2020-06-07 RX ADMIN — Medication 1 MILLIGRAM(S): at 11:28

## 2020-06-07 RX ADMIN — Medication 40 MILLIGRAM(S): at 05:36

## 2020-06-07 RX ADMIN — ATORVASTATIN CALCIUM 40 MILLIGRAM(S): 80 TABLET, FILM COATED ORAL at 11:29

## 2020-06-07 RX ADMIN — MEROPENEM 100 MILLIGRAM(S): 1 INJECTION INTRAVENOUS at 13:58

## 2020-06-07 RX ADMIN — Medication 325 MILLIGRAM(S): at 11:28

## 2020-06-07 RX ADMIN — Medication 5000 UNIT(S): at 11:29

## 2020-06-07 RX ADMIN — MEROPENEM 100 MILLIGRAM(S): 1 INJECTION INTRAVENOUS at 05:36

## 2020-06-07 RX ADMIN — Medication 40 MILLIEQUIVALENT(S): at 13:58

## 2020-06-07 NOTE — PROGRESS NOTE ADULT - ASSESSMENT
88 y/o female with acute cholecystitis, s/p percutaneous cholecystotomy by iR 6/5    Plan:   Trend WBC   Monitor for fever   Monitor LFTs   monitor drain output  continue IV antibiotics

## 2020-06-07 NOTE — PROGRESS NOTE ADULT - SUBJECTIVE AND OBJECTIVE BOX
GENERAL SURGERY PROGRESS NOTE     SARMAD CHINA  [address] [place] [address] day :3d  POD:  Procedure:   Surgical Attending: Dax Jaime  Overnight events: White count and abdominal pain improving. Patient endorses Nausea with PO intake.     T(F): 99.1 (06-06-20 @ 20:11), Max: 100 (06-06-20 @ 05:34)  HR: 78 (06-06-20 @ 20:11) (78 - 88)  BP: 112/58 (06-06-20 @ 20:11) (112/58 - 126/56)  ABP: --  ABP(mean): --  RR: 18 (06-06-20 @ 20:11) (18 - 19)  SpO2: 99% (06-06-20 @ 07:40) (99% - 99%)      06-05-20 @ 07:01  -  06-06-20 @ 07:00  --------------------------------------------------------  IN:  Total IN: 0 mL    OUT:    Drain: 150 mL  Total OUT: 150 mL    Total NET: -150 mL      06-06-20 @ 07:01  -  06-07-20 @ 02:14  --------------------------------------------------------  IN:    IV PiggyBack: 300 mL  Total IN: 300 mL    OUT:    Drain: 110 mL  Total OUT: 110 mL    Total NET: 190 mL        DIET/FLUIDS: cholecalciferol 5000 Unit(s) Oral daily  ferrous    sulfate 325 milliGRAM(s) Oral daily  folic acid 1 milliGRAM(s) Oral daily    NG:                                                                                DRAINS:   06-05-20 @ 07:01  -  06-06-20 @ 07:00  --------------------------------------------------------  OUT: 150 mL      BM:     EMESIS:     URINE:      GI proph:    AC/ proph:   ABx: meropenem  IVPB 500 milliGRAM(s) IV Intermittent every 8 hours  meropenem  IVPB          PHYSICAL EXAM:  GENERAL: NAD, well-appearing  CHEST/LUNG: Clear to auscultation bilaterally  HEART: Regular rate and rhythm  ABDOMEN: RUQ tender, IR drain inplace  EXTREMITIES:  No clubbing, cyanosis, or edema      LABS  Labs:  CAPILLARY BLOOD GLUCOSE                        10.2   12.32 )-----------( 173      ( 06 Jun 2020 05:42 )             33.0       Auto Neutrophil %: 78.7 % (06-06-20 @ 05:42)  Auto Immature Granulocyte %: 1.5 % (06-06-20 @ 05:42)    06-06    145  |  103  |  33<H>  ----------------------------<  121<H>  3.2<L>   |  30  |  0.9      Calcium, Total Serum: 8.3 mg/dL (06-06-20 @ 05:42)      LFTs:             5.5  | 0.6  | 36       ------------------[87      ( 05 Jun 2020 06:41 )  2.8  | x    | 28          Lipase:x      Amylase:x         Lactate, Blood: 1.6 mmol/L (06-05-20 @ 06:41)  Blood Gas Venous - Lactate: 1.2 mmoL/L (06-04-20 @ 18:09)      Coags:     14.10  ----< 1.23    ( 05 Jun 2020 06:41 )     30.0        Culture - Acid Fast - Body Fluid w/Smear (collected 05 Jun 2020 13:22)  Source: .Body Fluid percutaneous cholecystostomy    Culture - Body Fluid with Gram Stain (collected 05 Jun 2020 13:22)  Source: .Body Fluid percutaneous cholecystostomy  Gram Stain (06 Jun 2020 01:16):    polymorphonuclear leukocytes seen    Gram Negative Rods seen    Gram positive cocci in pairs seen    by cytocentrifuge  Preliminary Report (06 Jun 2020 19:18):    Numerous Escherichia coli    Moderate Enterococcus faecium  Organism: Escherichia coli  Enterococcus faecium (06 Jun 2020 19:18)  Organism: Enterococcus faecium (06 Jun 2020 19:18)  Organism: Escherichia coli (06 Jun 2020 19:17)    Culture - Blood (collected 05 Jun 2020 06:41)  Source: .Blood Blood  Preliminary Report (06 Jun 2020 13:01):    No growth to date.    Culture - Blood (collected 04 Jun 2020 17:47)  Source: .Blood Blood  Preliminary Report (06 Jun 2020 01:01):    No growth to date.    Culture - Blood (collected 04 Jun 2020 17:47)  Source: .Blood Blood  Preliminary Report (06 Jun 2020 01:01):    No growth to date.

## 2020-06-07 NOTE — PROGRESS NOTE ADULT - SUBJECTIVE AND OBJECTIVE BOX
SUBJECTIVE:    Patient is a 87y old Female who presents with a chief complaint of abdominal pain (07 Jun 2020 02:14)    Overnight Events:  Patient was seen at bed side this morning . this morning was out of bed and walking around room. patient says she still has a little pain in right flank. patient drain with about 50 cc brownish fluid. patient denied chest pain, sob.    PAST MEDICAL & SURGICAL HISTORY  High cholesterol  CHF (congestive heart failure)  HTN (hypertension)  No pertinent past medical history  No significant past surgical history    SOCIAL HISTORY:  Negative for smoking/alcohol/drug use.     ALLERGIES:  cefepime (Unknown)  mavis (Rash)  peanuts (Rash)  varicella virus vaccine (Unknown)    MEDICATIONS:  STANDING MEDICATIONS  atorvastatin Oral Tab/Cap - Peds 40 milliGRAM(s) Oral daily  cholecalciferol 5000 Unit(s) Oral daily  enoxaparin Injectable 40 milliGRAM(s) SubCutaneous at bedtime  ferrous    sulfate 325 milliGRAM(s) Oral daily  folic acid 1 milliGRAM(s) Oral daily  furosemide    Tablet 40 milliGRAM(s) Oral daily  meropenem  IVPB 500 milliGRAM(s) IV Intermittent every 8 hours  meropenem  IVPB      metolazone 2.5 milliGRAM(s) Oral daily  metoprolol succinate ER 25 milliGRAM(s) Oral daily  potassium chloride    Tablet ER 40 milliEquivalent(s) Oral once  senna 2 Tablet(s) Oral at bedtime    PRN MEDICATIONS    VITALS:   T(F): 99.5  HR: 89  BP: 130/60  RR: 18  SpO2: 95%    06-06-20 @ 07:01  -  06-07-20 @ 07:00  --------------------------------------------------------  IN: 300 mL / OUT: 150 mL / NET: 150 mL      PHYSICAL EXAM:  . General: NAD  . HEENTNC AT   · Respiratory: Breath Sounds equal & clear to  auscultation, no accessory muscle use  · Cardiovascular: Regular rate & rhythm, normal S1, S2; no murmurs, gallops or rubs;  · Gastrointestinal Soft,ttp right flank normal bowel sounds, drain in place with 50 cc fluid  · Extremities:	No cyanosis, clubbing or edema  · Skin no macular rashs, no lacerations.   . Neuro: non focal A&Ox3    LABS:                        10.5   10.00 )-----------( 189      ( 07 Jun 2020 06:19 )             33.8     06-07    143  |  101  |  28<H>  ----------------------------<  116<H>  3.4<L>   |  30  |  0.9    Ca    8.6      07 Jun 2020 06:19  Mg     2.6     06-07    TPro  5.4<L>  /  Alb  2.7<L>  /  TBili  0.5  /  DBili  x   /  AST  36  /  ALT  29  /  AlkPhos  99  06-07              Culture - Acid Fast - Body Fluid w/Smear (collected 05 Jun 2020 13:22)  Source: .Body Fluid percutaneous cholecystostomy    Culture - Body Fluid with Gram Stain (collected 05 Jun 2020 13:22)  Source: .Body Fluid percutaneous cholecystostomy  Gram Stain (06 Jun 2020 01:16):    polymorphonuclear leukocytes seen    Gram Negative Rods seen    Gram positive cocci in pairs seen    by cytocentrifuge  Preliminary Report (06 Jun 2020 19:18):    Numerous Escherichia coli    Moderate Enterococcus faecium  Organism: Escherichia coli  Enterococcus faecium (06 Jun 2020 19:18)  Organism: Enterococcus faecium (06 Jun 2020 19:18)  Organism: Escherichia coli (06 Jun 2020 19:17)    Culture - Blood (collected 05 Jun 2020 06:41)  Source: .Blood Blood  Preliminary Report (06 Jun 2020 13:01):    No growth to date.    Culture - Blood (collected 04 Jun 2020 17:47)  Source: .Blood Blood  Preliminary Report (06 Jun 2020 01:01):    No growth to date.    Culture - Blood (collected 04 Jun 2020 17:47)  Source: .Blood Blood  Preliminary Report (06 Jun 2020 01:01):    No growth to date.              06-06-20 @ 07:01  -  06-07-20 @ 07:00  --------------------------------------------------------  IN: 300 mL / OUT: 150 mL / NET: 150 mL          Radiology:

## 2020-06-07 NOTE — PROGRESS NOTE ADULT - ASSESSMENT
Sepsis secondary to acute cholecystitis  - Sepsis present on admission with WBC and Fever (Fever resolved)  - on sherwin  -currently afebrile   -wbc wnl   - s/p percutaneous cholecystotomy by iR 6/5  -drain in place with about 50cc brownish fluid   -BC NGTD    Hypokalemia, Mild  - Likely medication-induced, now NPO and without supplementation  - Will replete throughout day and follow up with metabolic panel later  - May benefit from K+ 20 meq PO daily upon discharge  -3.4 this am, will replete     Hx of CAD; s/p CABG; Hx of HFpEF  - Continue home Medications  - Continue Metolazone/Lasix combo; Giving fluids slowly as above mentioned  - Follow up daily weights and I+O; Avoid salt when diet resumed  - Cardiology: Dr. Mancini    Hx of Iron Deficient Anemia: Continue ferrous sulfate  Hx of Constipation: Continue hjome Senna; Miralax PRN  Hx of Osteoporosis: Bisphosphonate weekly as OP  Hx of Folate and B12 deficiency: Continue supplementation    GI ppx:   Not indicated  DVT ppx:   Lovenox 40mg SubQ  Activity:   As Tolerated   DISPO:  Patient to be discharged when condition(s) optimized.    CODE STATUS:   FULL

## 2020-06-08 LAB
ALBUMIN SERPL ELPH-MCNC: 3.1 G/DL — LOW (ref 3.5–5.2)
ALP SERPL-CCNC: 99 U/L — SIGNIFICANT CHANGE UP (ref 30–115)
ALT FLD-CCNC: 28 U/L — SIGNIFICANT CHANGE UP (ref 0–41)
ANION GAP SERPL CALC-SCNC: 14 MMOL/L — SIGNIFICANT CHANGE UP (ref 7–14)
AST SERPL-CCNC: 36 U/L — SIGNIFICANT CHANGE UP (ref 0–41)
BASOPHILS # BLD AUTO: 0.04 K/UL — SIGNIFICANT CHANGE UP (ref 0–0.2)
BASOPHILS NFR BLD AUTO: 0.5 % — SIGNIFICANT CHANGE UP (ref 0–1)
BILIRUB SERPL-MCNC: 0.4 MG/DL — SIGNIFICANT CHANGE UP (ref 0.2–1.2)
BUN SERPL-MCNC: 29 MG/DL — HIGH (ref 10–20)
CALCIUM SERPL-MCNC: 9.4 MG/DL — SIGNIFICANT CHANGE UP (ref 8.5–10.1)
CHLORIDE SERPL-SCNC: 98 MMOL/L — SIGNIFICANT CHANGE UP (ref 98–110)
CO2 SERPL-SCNC: 32 MMOL/L — SIGNIFICANT CHANGE UP (ref 17–32)
CREAT SERPL-MCNC: 0.8 MG/DL — SIGNIFICANT CHANGE UP (ref 0.7–1.5)
EOSINOPHIL # BLD AUTO: 0.22 K/UL — SIGNIFICANT CHANGE UP (ref 0–0.7)
EOSINOPHIL NFR BLD AUTO: 2.5 % — SIGNIFICANT CHANGE UP (ref 0–8)
GLUCOSE SERPL-MCNC: 111 MG/DL — HIGH (ref 70–99)
HCT VFR BLD CALC: 36.6 % — LOW (ref 37–47)
HGB BLD-MCNC: 11.9 G/DL — LOW (ref 12–16)
IMM GRANULOCYTES NFR BLD AUTO: 2.9 % — HIGH (ref 0.1–0.3)
LYMPHOCYTES # BLD AUTO: 1.2 K/UL — SIGNIFICANT CHANGE UP (ref 1.2–3.4)
LYMPHOCYTES # BLD AUTO: 13.6 % — LOW (ref 20.5–51.1)
MCHC RBC-ENTMCNC: 28.6 PG — SIGNIFICANT CHANGE UP (ref 27–31)
MCHC RBC-ENTMCNC: 32.5 G/DL — SIGNIFICANT CHANGE UP (ref 32–37)
MCV RBC AUTO: 88 FL — SIGNIFICANT CHANGE UP (ref 81–99)
MONOCYTES # BLD AUTO: 1.08 K/UL — HIGH (ref 0.1–0.6)
MONOCYTES NFR BLD AUTO: 12.2 % — HIGH (ref 1.7–9.3)
NEUTROPHILS # BLD AUTO: 6.02 K/UL — SIGNIFICANT CHANGE UP (ref 1.4–6.5)
NEUTROPHILS NFR BLD AUTO: 68.3 % — SIGNIFICANT CHANGE UP (ref 42.2–75.2)
NRBC # BLD: 0 /100 WBCS — SIGNIFICANT CHANGE UP (ref 0–0)
PLATELET # BLD AUTO: 244 K/UL — SIGNIFICANT CHANGE UP (ref 130–400)
POTASSIUM SERPL-MCNC: 3.2 MMOL/L — LOW (ref 3.5–5)
POTASSIUM SERPL-SCNC: 3.2 MMOL/L — LOW (ref 3.5–5)
PROT SERPL-MCNC: 6.1 G/DL — SIGNIFICANT CHANGE UP (ref 6–8)
RBC # BLD: 4.16 M/UL — LOW (ref 4.2–5.4)
RBC # FLD: 14.7 % — HIGH (ref 11.5–14.5)
SODIUM SERPL-SCNC: 144 MMOL/L — SIGNIFICANT CHANGE UP (ref 135–146)
WBC # BLD: 8.82 K/UL — SIGNIFICANT CHANGE UP (ref 4.8–10.8)
WBC # FLD AUTO: 8.82 K/UL — SIGNIFICANT CHANGE UP (ref 4.8–10.8)

## 2020-06-08 PROCEDURE — 99233 SBSQ HOSP IP/OBS HIGH 50: CPT

## 2020-06-08 RX ORDER — MAGNESIUM SULFATE 500 MG/ML
1 VIAL (ML) INJECTION ONCE
Refills: 0 | Status: COMPLETED | OUTPATIENT
Start: 2020-06-08 | End: 2020-06-08

## 2020-06-08 RX ORDER — POTASSIUM CHLORIDE 20 MEQ
40 PACKET (EA) ORAL
Refills: 0 | Status: COMPLETED | OUTPATIENT
Start: 2020-06-08 | End: 2020-06-08

## 2020-06-08 RX ADMIN — MEROPENEM 100 MILLIGRAM(S): 1 INJECTION INTRAVENOUS at 15:14

## 2020-06-08 RX ADMIN — Medication 100 GRAM(S): at 09:00

## 2020-06-08 RX ADMIN — Medication 40 MILLIGRAM(S): at 06:43

## 2020-06-08 RX ADMIN — MEROPENEM 100 MILLIGRAM(S): 1 INJECTION INTRAVENOUS at 21:13

## 2020-06-08 RX ADMIN — ATORVASTATIN CALCIUM 40 MILLIGRAM(S): 80 TABLET, FILM COATED ORAL at 12:08

## 2020-06-08 RX ADMIN — Medication 30 MILLILITER(S): at 21:37

## 2020-06-08 RX ADMIN — ENOXAPARIN SODIUM 40 MILLIGRAM(S): 100 INJECTION SUBCUTANEOUS at 21:13

## 2020-06-08 RX ADMIN — Medication 40 MILLIEQUIVALENT(S): at 15:15

## 2020-06-08 RX ADMIN — Medication 325 MILLIGRAM(S): at 12:08

## 2020-06-08 RX ADMIN — MEROPENEM 100 MILLIGRAM(S): 1 INJECTION INTRAVENOUS at 06:43

## 2020-06-08 RX ADMIN — Medication 25 MILLIGRAM(S): at 06:43

## 2020-06-08 RX ADMIN — Medication 40 MILLIEQUIVALENT(S): at 12:10

## 2020-06-08 RX ADMIN — Medication 1 MILLIGRAM(S): at 12:08

## 2020-06-08 RX ADMIN — Medication 5000 UNIT(S): at 12:08

## 2020-06-08 NOTE — PROGRESS NOTE ADULT - ASSESSMENT
87 year old female with a PMH of HTN, CHF, CAD s/p CABG, and osteoporosis presents with abdominal pain for 4 days duration.  patient was doing well until 4 days prior to presentation when started to RUQ pain, moderate in severity 6/10, no radiation episodic, associated with loss of appetite nausea with no vomiting, no diarrhea, no melena, no hematemesis no BRBPR, today she spiked fever, no chills.     INTERVAL EVENTS: pt with 50cc dark brown drainage form percutaneuos choly, pt with mild pain at drianage site but denies any other pain, chest pain, shortness f breath  PT/OT to see pt.    #Sepsis secondary to acute cholecystitis s/p percutaneous drainage by IR on 6/5 on sherwin  -Sepsis present on admission with WBC and Fever both resolved  - CT/ US findings suggest cholecystectomy, evaluated by surgery deemed  not a candidate for surgical intervention. s/p percutaneous cholecystotomy by IR 6/5  -drain in place with about 50cc brownish fluid   -fluid +Ecoli and Enterococcus sensitive to meropenum will need Id f/u for duration of antibiotics  -BC NGTD    #Hypokalemia, repleting  - May benefit from K+ 20 meq PO daily upon discharge  -3.2 this am, will replete     #Hx of CAD; s/p CABG; Hx of HFpEF  - Continue home Medications  - Continue Metolazone/Lasix combo; Giving fluids slowly as above mentioned  - Follow up daily weights and I+O; Avoid salt when diet resumed  - Cardiology: Dr. Mancini    Hx of Iron Deficient Anemia: Continue ferrous sulfate  Hx of Constipation: Continue hjome Senna; Miralax PRN  Hx of Osteoporosis: Bisphosphonate weekly as OP  Hx of Folate and B12 deficiency: Continue supplementation    GI ppx:   Not indicated  DVT ppx:   Lovenox 40mg SubQ  Activity:   As Tolerated   DISPO:  Patient to be discharged when condition(s) optimized. pt lives alone at home    CODE STATUS:   FULL

## 2020-06-08 NOTE — PROGRESS NOTE ADULT - SUBJECTIVE AND OBJECTIVE BOX
GENERAL SURGERY PROGRESS NOTE     CHINA BAÑUELOS  Female  Hospital day: 5d    OVERNIGHT EVENTS: no acute events overnight. WBC 10, afebrile, no pain    T(F): 97.8 (06-08-20 @ 04:50), Max: 99 (06-07-20 @ 13:42)  HR: 80 (06-08-20 @ 04:50) (76 - 80)  BP: 134/63 (06-08-20 @ 04:50) (119/63 - 134/63)  RR: 18 (06-08-20 @ 04:50) (18 - 19)  SpO2: 95% (06-07-20 @ 08:44) (95% - 95%)    DIET/FLUIDS: cholecalciferol 5000 Unit(s) Oral daily  ferrous    sulfate 325 milliGRAM(s) Oral daily  folic acid 1 milliGRAM(s) Oral daily                                                                             DRAINS:   06-07-20 @ 07:01  -  06-08-20 @ 07:00  --------------------------------------------------------  OUT: 60 mL    ABx: meropenem  IVPB 500 milliGRAM(s) IV Intermittent every 8 hours  meropenem  IVPB        PHYSICAL EXAM:  GENERAL: NAD, well-appearing  CHEST/LUNG: Clear to auscultation bilaterally  HEART: Regular rate and rhythm  ABDOMEN: Soft, Nontender, Nondistended; drain in RUQ with bilious output  EXTREMITIES:  No clubbing, cyanosis, or edema    LABS:               10.5   10.00 )-----------( 189      ( 07 Jun 2020 06:19 )             33.8         06-07    143  |  101  |  28<H>  ----------------------------<  116<H>  3.4<L>   |  30  |  0.9    LFTs:             5.4  | 0.5  | 36       ------------------[99      ( 07 Jun 2020 06:19 )  2.7  | x    | 29          Culture - Acid Fast - Body Fluid w/Smear (collected 05 Jun 2020 13:22)  Source: .Body Fluid percutaneous cholecystostomy    Culture - Body Fluid with Gram Stain (collected 05 Jun 2020 13:22)  Source: .Body Fluid percutaneous cholecystostomy  Gram Stain (06 Jun 2020 01:16):    polymorphonuclear leukocytes seen    Gram Negative Rods seen    Gram positive cocci in pairs seen    by cytocentrifuge  Preliminary Report (06 Jun 2020 19:18):    Numerous Escherichia coli    Moderate Enterococcus faecium  Organism: Escherichia coli  Enterococcus faecium (06 Jun 2020 19:18)  Organism: Enterococcus faecium (06 Jun 2020 19:18)  Organism: Escherichia coli (06 Jun 2020 19:17)

## 2020-06-08 NOTE — DIETITIAN INITIAL EVALUATION ADULT. - PERTINENT MEDS FT
Lovenox, potassium chloride, atorvastatin, vitamin d3, ferrous sulfate, folic acid, furosemide, metolazone, metoprolol, senna

## 2020-06-08 NOTE — CONSULT NOTE ADULT - ASSESSMENT
ASSESSMENT  87 year old female with a PMH of HTN, CHF, CAD s/p CABG, and osteoporosis presents with acute cholecystitis s/p cholecystotomy    IMPRESSION  #Acute cholecystitis s/p cholecystotomy    6/5 biliary cx   Numerous Escherichia coli (R fluoro) Moderate Enterococcus faecium (S)    6/5 BCX NG  #Severe sepsis on admission Pulse>90,  WBC>12 GUZMAN  #Abx allergy: cefepime (Unknown)      RECOMMENDATIONS  - On meropenem IV  - ADD po amox 875mg BID for enterococcal coverage  - please find out what allergy to cefepime is- depending on severity, PO vantin & flagyl + amox is an option, otherwise Midline x ertapenem and po amox x 7 days post IR drainage    Spectra 58

## 2020-06-08 NOTE — PHYSICAL THERAPY INITIAL EVALUATION ADULT - PERTINENT HX OF CURRENT PROBLEM, REHAB EVAL
87 year old female with a PMH of HTN, CHF, CAD s/p CABG, and osteoporosis presents with abdominal pain for 4 days duration.

## 2020-06-08 NOTE — DIETITIAN INITIAL EVALUATION ADULT. - RD TO REMAIN AVAILABLE
Interventions: meals and snacks, medical food supplement. Monitor/Evaluate: RD to monitor energy intake, diet order, body comp, NFPF, electrolytes profile, anemia profile./yes

## 2020-06-08 NOTE — PROGRESS NOTE ADULT - SUBJECTIVE AND OBJECTIVE BOX
SUBJECTIVE:    Patient is a 87y old Female who presents with a chief complaint of abdominal pain (08 Jun 2020 07:25)    Currently admitted to medicine with the primary diagnosis of Acute cholecystitis     Today is hospital day 4d. This morning she is resting comfortably in bed and reports no new issues or overnight events.     INTERVAL EVENTS: pt with 50cc dark brown drainage form percutaneuos choly, pt with mild pain at drianage site but denies any other pain, chest pain, shortness f breath    PAST MEDICAL & SURGICAL HISTORY  High cholesterol  CHF (congestive heart failure)  HTN (hypertension)  No pertinent past medical history  No significant past surgical history      ALLERGIES:  cefepime (Unknown)  mavis (Rash)  peanuts (Rash)  varicella virus vaccine (Unknown)    MEDICATIONS:  STANDING MEDICATIONS  atorvastatin Oral Tab/Cap - Peds 40 milliGRAM(s) Oral daily  cholecalciferol 5000 Unit(s) Oral daily  enoxaparin Injectable 40 milliGRAM(s) SubCutaneous at bedtime  ferrous    sulfate 325 milliGRAM(s) Oral daily  folic acid 1 milliGRAM(s) Oral daily  furosemide    Tablet 40 milliGRAM(s) Oral daily  meropenem  IVPB      meropenem  IVPB 500 milliGRAM(s) IV Intermittent every 8 hours  metolazone 2.5 milliGRAM(s) Oral daily  metoprolol succinate ER 25 milliGRAM(s) Oral daily  potassium chloride   Powder 40 milliEquivalent(s) Oral every 2 hours  senna 2 Tablet(s) Oral at bedtime    PRN MEDICATIONS    VITALS:   T(F): 97.8  HR: 80  BP: 134/63  RR: 18  SpO2: --    LABS:                        11.9   8.82  )-----------( 244      ( 08 Jun 2020 07:40 )             36.6     06-08    144  |  98  |  29<H>  ----------------------------<  111<H>  3.2<L>   |  32  |  0.8    Ca    9.4      08 Jun 2020 07:40  Mg     2.6     06-07    TPro  6.1  /  Alb  3.1<L>  /  TBili  0.4  /  DBili  x   /  AST  36  /  ALT  28  /  AlkPhos  99  06-08              Culture - Acid Fast - Body Fluid w/Smear (collected 05 Jun 2020 13:22)  Source: .Body Fluid percutaneous cholecystostomy    Culture - Body Fluid with Gram Stain (collected 05 Jun 2020 13:22)  Source: .Body Fluid percutaneous cholecystostomy  Gram Stain (06 Jun 2020 01:16):    polymorphonuclear leukocytes seen    Gram Negative Rods seen    Gram positive cocci in pairs seen    by cytocentrifuge  Preliminary Report (06 Jun 2020 19:18):    Numerous Escherichia coli    Moderate Enterococcus faecium  Organism: Escherichia coli  Enterococcus faecium (06 Jun 2020 19:18)  Organism: Enterococcus faecium (06 Jun 2020 19:18)  Organism: Escherichia coli (06 Jun 2020 19:17)          RADIOLOGY:    PHYSICAL EXAM:  GEN: No acute distress  PULM/CHEST: Clear to auscultation bilaterally, no rales, rhonchi or wheezes   CVS: Regular rate and rhythm, S1-S2, no murmurs  ABD: Soft, non-tender, non-distended, +BS, right precuneus cholecystectomy drain with 50cc of dark brown fluid  EXT: No edema  NEURO: AAOx3    Lee Catheter: SUBJECTIVE:    Patient is a 87y old Female who presents with a chief complaint of abdominal pain (08 Jun 2020 07:25)    Currently admitted to medicine with the primary diagnosis of Acute cholecystitis     Today is hospital day 4d. This morning she is resting comfortably in bed and reports no new issues or overnight events.     INTERVAL EVENTS: pt with 50cc dark brown drainage form percutaneuos choly, pt with mild pain at drianage site but denies any other pain, chest pain, shortness of breath    PAST MEDICAL & SURGICAL HISTORY  High cholesterol  CHF (congestive heart failure)  HTN (hypertension)  No pertinent past medical history  No significant past surgical history      ALLERGIES:  cefepime (Unknown)  mavis (Rash)  peanuts (Rash)  varicella virus vaccine (Unknown)    MEDICATIONS:  STANDING MEDICATIONS  atorvastatin Oral Tab/Cap - Peds 40 milliGRAM(s) Oral daily  cholecalciferol 5000 Unit(s) Oral daily  enoxaparin Injectable 40 milliGRAM(s) SubCutaneous at bedtime  ferrous    sulfate 325 milliGRAM(s) Oral daily  folic acid 1 milliGRAM(s) Oral daily  furosemide    Tablet 40 milliGRAM(s) Oral daily  meropenem  IVPB      meropenem  IVPB 500 milliGRAM(s) IV Intermittent every 8 hours  metolazone 2.5 milliGRAM(s) Oral daily  metoprolol succinate ER 25 milliGRAM(s) Oral daily  potassium chloride   Powder 40 milliEquivalent(s) Oral every 2 hours  senna 2 Tablet(s) Oral at bedtime    PRN MEDICATIONS    VITALS:   T(F): 97.8  HR: 80  BP: 134/63  RR: 18  SpO2: --    LABS:                        11.9   8.82  )-----------( 244      ( 08 Jun 2020 07:40 )             36.6     06-08    144  |  98  |  29<H>  ----------------------------<  111<H>  3.2<L>   |  32  |  0.8    Ca    9.4      08 Jun 2020 07:40  Mg     2.6     06-07    TPro  6.1  /  Alb  3.1<L>  /  TBili  0.4  /  DBili  x   /  AST  36  /  ALT  28  /  AlkPhos  99  06-08    Culture - Acid Fast - Body Fluid w/Smear (collected 05 Jun 2020 13:22)  Source: .Body Fluid percutaneous cholecystostomy    Culture - Body Fluid with Gram Stain (collected 05 Jun 2020 13:22)  Source: .Body Fluid percutaneous cholecystostomy  Gram Stain (06 Jun 2020 01:16):    polymorphonuclear leukocytes seen    Gram Negative Rods seen    Gram positive cocci in pairs seen    by cytocentrifuge  Preliminary Report (06 Jun 2020 19:18):    Numerous Escherichia coli    Moderate Enterococcus faecium  Organism: Escherichia coli  Enterococcus faecium (06 Jun 2020 19:18)  Organism: Enterococcus faecium (06 Jun 2020 19:18)  Organism: Escherichia coli (06 Jun 2020 19:17)    RADIOLOGY:    PHYSICAL EXAM:  GEN: No acute distress  PULM/CHEST: Clear to auscultation bilaterally, no rales, rhonchi or wheezes   CVS: Regular rate and rhythm, S1-S2, no murmurs  ABD: Soft, non-tender, non-distended, +BS, right precuneus cholecystectomy drain with 50cc of dark brown fluid  EXT: No edema  NEURO: AAOx3    Lee Catheter:

## 2020-06-08 NOTE — CONSULT NOTE ADULT - SUBJECTIVE AND OBJECTIVE BOX
SARMADCHINA  87y, Female  Allergy: cefepime (Unknown)  mavis (Rash)  peanuts (Rash)  varicella virus vaccine (Unknown)      CHIEF COMPLAINT: abdominal pain (08 Jun 2020 12:16)      LOS  4d    HPI:  87 year old female with a PMH of HTN, CHF, CAD s/p CABG, and osteoporosis presents with abdominal pain for 4 days duration.  patient was doing well until 4 days prior to presentation when started to RUQ pain, moderate in severity 6/10, no radiation episodic, associated with loss of appetite nausea with no vomiting, no diarrhea, no melena, no hematemesis no BRBPR, today she spiked fever, no chills.   denies chest pain, cough, or SOB.   In ED hemodynamically stable febrile 100.5  WBC of 21.22. CT/ US findings suggest cholecystitis, evaluated by surgery deemed  not a candidate for surgical intervention. (04 Jun 2020 22:19)      PAST MEDICAL & SURGICAL HISTORY:  High cholesterol  CHF (congestive heart failure)  HTN (hypertension)  No pertinent past medical history  No significant past surgical history      FAMILY HISTORY  No pertinent family history in first degree relatives      SOCIAL HISTORY  Social History:  no smoking alcohol  or substance use (04 Jun 2020 22:19)        ROS  General: Denies rigors, nightsweats  HEENT: Denies headache, rhinorrhea, sore throat, eye pain  CV: Denies CP, palpitations  PULM: Denies wheezing, hemoptysis  GI: Denies hematemesis, hematochezia, melena  : Denies discharge, hematuria  MSK: Denies arthralgias, myalgias  SKIN: Denies rash, lesions  NEURO: Denies paresthesias, weakness  PSYCH: Denies depression, anxiety    VITALS:  T(F): 98.2, Max: 98.2 (06-08-20 @ 13:20)  HR: 71  BP: 128/58  RR: 19Vital Signs Last 24 Hrs  T(C): 36.8 (08 Jun 2020 13:20), Max: 36.8 (08 Jun 2020 13:20)  T(F): 98.2 (08 Jun 2020 13:20), Max: 98.2 (08 Jun 2020 13:20)  HR: 71 (08 Jun 2020 13:20) (71 - 80)  BP: 128/58 (08 Jun 2020 13:20) (128/58 - 134/63)  BP(mean): --  RR: 19 (08 Jun 2020 13:20) (18 - 19)  SpO2: --    PHYSICAL EXAM:  Gen: Elderly F NAD, resting in bed  HEENT: Normocephalic, atraumatic  Neck: supple, no lymphadenopathy  CV: Regular rate & regular rhythm  Lungs: decreased BS at bases, no fremitus  Abdomen: Soft, BS present  cholecystostomy  Ext: Warm, well perfused  Neuro: non focal, awake  Skin: no rash, no erythema  Lines: no phlebitis    TESTS & MEASUREMENTS:                        11.9   8.82  )-----------( 244      ( 08 Jun 2020 07:40 )             36.6     06-08    144  |  98  |  29<H>  ----------------------------<  111<H>  3.2<L>   |  32  |  0.8    Ca    9.4      08 Jun 2020 07:40  Mg     2.6     06-07    TPro  6.1  /  Alb  3.1<L>  /  TBili  0.4  /  DBili  x   /  AST  36  /  ALT  28  /  AlkPhos  99  06-08    eGFR if Non African American: 66 mL/min/1.73M2 (06-08-20 @ 07:40)  eGFR if : 77 mL/min/1.73M2 (06-08-20 @ 07:40)    LIVER FUNCTIONS - ( 08 Jun 2020 07:40 )  Alb: 3.1 g/dL / Pro: 6.1 g/dL / ALK PHOS: 99 U/L / ALT: 28 U/L / AST: 36 U/L / GGT: x               Culture - Acid Fast - Body Fluid w/Smear (collected 06-05-20 @ 13:22)  Source: .Body Fluid percutaneous cholecystostomy    Culture - Body Fluid with Gram Stain (collected 06-05-20 @ 13:22)  Source: .Body Fluid percutaneous cholecystostomy  Gram Stain (06-06-20 @ 01:16):    polymorphonuclear leukocytes seen    Gram Negative Rods seen    Gram positive cocci in pairs seen    by cytocentrifuge  Preliminary Report (06-06-20 @ 19:18):    Numerous Escherichia coli    Moderate Enterococcus faecium  Organism: Escherichia coli  Enterococcus faecium (06-06-20 @ 19:18)  Organism: Enterococcus faecium (06-06-20 @ 19:18)      -  Ampicillin: S <=2 Predicts results to ampicillin/sulbactam, amoxacillin-clavulanate and  piperacillin-tazobactam.      -  Tetra/Doxy: S <=1      -  Vancomycin: S 4      Method Type: COLETTE  Organism: Escherichia coli (06-06-20 @ 19:17)      -  Amikacin: S <=16      -  Amoxicillin/Clavulanic Acid: I 16/8      -  Ampicillin: R >16 These ampicillin results predict results for amoxicillin      -  Ampicillin/Sulbactam: R >16/8 Enterobacter, Citrobacter, and Serratia may develop resistance during prolonged therapy (3-4 days)      -  Aztreonam: S <=4      -  Cefazolin: R >16 Enterobacter, Citrobacter, and Serratia may develop resistance during prolonged therapy (3-4 days)      -  Cefepime: S <=2      -  Cefoxitin: S <=8      -  Ceftriaxone: S <=1 Enterobacter, Citrobacter, and Serratia may develop resistance during prolonged therapy      -  Ciprofloxacin: R 1      -  Ertapenem: S <=0.5      -  Gentamicin: R >8      -  Imipenem: S <=1      -  Levofloxacin: I 1      -  Meropenem: S <=1      -  Piperacillin/Tazobactam: S <=8      -  Tobramycin: R >8      -  Trimethoprim/Sulfamethoxazole: S <=0.5/9.5      Method Type: COLETTE    Culture - Blood (collected 06-05-20 @ 06:41)  Source: .Blood Blood  Preliminary Report (06-06-20 @ 13:01):    No growth to date.    Culture - Blood (collected 06-04-20 @ 17:47)  Source: .Blood Blood  Preliminary Report (06-06-20 @ 01:01):    No growth to date.    Culture - Blood (collected 06-04-20 @ 17:47)  Source: .Blood Blood  Preliminary Report (06-06-20 @ 01:01):    No growth to date.        Lactate, Blood: 1.6 mmol/L (06-05-20 @ 06:41)  Blood Gas Venous - Lactate: 1.2 mmoL/L (06-04-20 @ 18:09)      INFECTIOUS DISEASES TESTING  COVID-19 PCR: NotDetec (06-04-20 @ 20:47)      RADIOLOGY & ADDITIONAL TESTS:  I have personally reviewed the last Chest xray  CXR      CT      CARDIOLOGY TESTING  12 Lead ECG:   Ventricular Rate 83 BPM    Atrial Rate 83 BPM    P-R Interval 144 ms    QRS Duration 96 ms    Q-T Interval 404 ms    QTC Calculation(Bezet) 474 ms    P Axis 53 degrees    R Axis -11 degrees    T Axis 7 degrees    Diagnosis Line Normal sinus rhythm  Voltage criteria for left ventricular hypertrophy  Nonspecific ST and T wave abnormality  Abnormal ECG    Confirmed by Houston Jackson ([id number]) on 6/5/2020 5:54:04 AM (06-04-20 @ 20:30)      MEDICATIONS  atorvastatin Oral Tab/Cap - Peds 40  cholecalciferol 5000  enoxaparin Injectable 40  ferrous    sulfate 325  folic acid 1  furosemide    Tablet 40  meropenem  IVPB   meropenem  IVPB 500  metolazone 2.5  metoprolol succinate ER 25  senna 2      Weight  Weight (kg): 60.1 (06-05-20 @ 16:49)    ANTIBIOTICS:  meropenem  IVPB      meropenem  IVPB 500 milliGRAM(s) IV Intermittent every 8 hours      ALLERGIES:  cefepime (Unknown)  mavis (Rash)  peanuts (Rash)  varicella virus vaccine (Unknown)

## 2020-06-08 NOTE — DIETITIAN INITIAL EVALUATION ADULT. - PHYSICAL APPEARANCE
BMI 28.7; AAOx4; no edema noted; no GI issues noted, LBM 6/7; no chewing/swallowing difficulties reported by RN, skin- surgical incision./overweight

## 2020-06-08 NOTE — DIETITIAN INITIAL EVALUATION ADULT. - ENERGY NEEDS
Calories: 4762-3746 kcal/day (MSJ x 1.2-1.3 AF)  Protein: 60-72 g/day (1-1.2 g/kg CBW)  Fluids: 1 ml/kcal or per LIP

## 2020-06-08 NOTE — PHYSICAL THERAPY INITIAL EVALUATION ADULT - GENERAL OBSERVATIONS, REHAB EVAL
PT IE completed. Patient encountered supine in bed, in NAD, +IV lock, s/p percutaneous cholecystotomy by IR 6/5, +drainage (R), agreeable to therapy.

## 2020-06-08 NOTE — PROGRESS NOTE ADULT - ASSESSMENT
Assessment:  86 y/o female with acute cholecystitis, s/p percutaneous cholecystotomy by IR 6/5    Plan:   Trend WBC   Monitor for fever   monitor drain output  continue IV antibiotics

## 2020-06-08 NOTE — DIETITIAN INITIAL EVALUATION ADULT. - ADD RECOMMEND
1. Add ensure clear TID to current diet order. 2. Consider advancing diet order DASH/TLC when medically able.

## 2020-06-08 NOTE — DIETITIAN INITIAL EVALUATION ADULT. - OTHER INFO
Pertinent Medical Information: Sepsis 2/2 acute cholecystitis s/p percutaneous drainage by IR on 6/5 on [facility]; sepsis and fever resolved. CT shows cholecystectomy, evaluated by surgery deemed not a candidate for surgical intervention, drain in place with about 50cc brownish fluid. ID f/u for duration of abx. Hypokalemia, repleting. Hx of iron deficiency anemia on supp. Hx of constipation on bowel regimen. Hx of folate and B12 deficiency on supp.    Pertinent Subje Pertinent Medical Information: Sepsis 2/2 acute cholecystitis s/p percutaneous drainage by IR on 6/5 on [facility]; sepsis and fever resolved. CT shows cholecystectomy, evaluated by surgery deemed not a candidate for surgical intervention, drain in place with about 50cc brownish fluid. ID f/u for duration of abx. Hypokalemia, repleting. Hx of iron deficiency anemia on supp. Hx of constipation on bowel regimen. Hx of folate and B12 deficiency on supp.    Pertinent Subjective Information: Pt sleeping during RD visit. No response from emergency contacts. No nutrition hx obtained. Pt is currently on clear liquid diet and tolerating it well per RN. Pt is allergic to peanut and mavis per EMR.

## 2020-06-09 ENCOUNTER — TRANSCRIPTION ENCOUNTER (OUTPATIENT)
Age: 85
End: 2020-06-09

## 2020-06-09 VITALS
SYSTOLIC BLOOD PRESSURE: 150 MMHG | WEIGHT: 132.06 LBS | RESPIRATION RATE: 20 BRPM | HEART RATE: 76 BPM | DIASTOLIC BLOOD PRESSURE: 66 MMHG | TEMPERATURE: 98 F

## 2020-06-09 LAB
ALBUMIN SERPL ELPH-MCNC: 2.8 G/DL — LOW (ref 3.5–5.2)
ALP SERPL-CCNC: 85 U/L — SIGNIFICANT CHANGE UP (ref 30–115)
ALT FLD-CCNC: 22 U/L — SIGNIFICANT CHANGE UP (ref 0–41)
ANION GAP SERPL CALC-SCNC: 12 MMOL/L — SIGNIFICANT CHANGE UP (ref 7–14)
AST SERPL-CCNC: 31 U/L — SIGNIFICANT CHANGE UP (ref 0–41)
BASOPHILS # BLD AUTO: 0.04 K/UL — SIGNIFICANT CHANGE UP (ref 0–0.2)
BASOPHILS NFR BLD AUTO: 0.4 % — SIGNIFICANT CHANGE UP (ref 0–1)
BILIRUB SERPL-MCNC: 0.3 MG/DL — SIGNIFICANT CHANGE UP (ref 0.2–1.2)
BUN SERPL-MCNC: 24 MG/DL — HIGH (ref 10–20)
CALCIUM SERPL-MCNC: 9 MG/DL — SIGNIFICANT CHANGE UP (ref 8.5–10.1)
CHLORIDE SERPL-SCNC: 102 MMOL/L — SIGNIFICANT CHANGE UP (ref 98–110)
CO2 SERPL-SCNC: 30 MMOL/L — SIGNIFICANT CHANGE UP (ref 17–32)
CREAT SERPL-MCNC: 0.7 MG/DL — SIGNIFICANT CHANGE UP (ref 0.7–1.5)
EOSINOPHIL # BLD AUTO: 0.15 K/UL — SIGNIFICANT CHANGE UP (ref 0–0.7)
EOSINOPHIL NFR BLD AUTO: 1.7 % — SIGNIFICANT CHANGE UP (ref 0–8)
GLUCOSE SERPL-MCNC: 104 MG/DL — HIGH (ref 70–99)
HCT VFR BLD CALC: 35.9 % — LOW (ref 37–47)
HGB BLD-MCNC: 11.5 G/DL — LOW (ref 12–16)
IMM GRANULOCYTES NFR BLD AUTO: 2.7 % — HIGH (ref 0.1–0.3)
LYMPHOCYTES # BLD AUTO: 1.45 K/UL — SIGNIFICANT CHANGE UP (ref 1.2–3.4)
LYMPHOCYTES # BLD AUTO: 16.3 % — LOW (ref 20.5–51.1)
MCHC RBC-ENTMCNC: 28 PG — SIGNIFICANT CHANGE UP (ref 27–31)
MCHC RBC-ENTMCNC: 32 G/DL — SIGNIFICANT CHANGE UP (ref 32–37)
MCV RBC AUTO: 87.6 FL — SIGNIFICANT CHANGE UP (ref 81–99)
MONOCYTES # BLD AUTO: 0.94 K/UL — HIGH (ref 0.1–0.6)
MONOCYTES NFR BLD AUTO: 10.6 % — HIGH (ref 1.7–9.3)
NEUTROPHILS # BLD AUTO: 6.08 K/UL — SIGNIFICANT CHANGE UP (ref 1.4–6.5)
NEUTROPHILS NFR BLD AUTO: 68.3 % — SIGNIFICANT CHANGE UP (ref 42.2–75.2)
NRBC # BLD: 0 /100 WBCS — SIGNIFICANT CHANGE UP (ref 0–0)
PLATELET # BLD AUTO: 247 K/UL — SIGNIFICANT CHANGE UP (ref 130–400)
POTASSIUM SERPL-MCNC: 4.2 MMOL/L — SIGNIFICANT CHANGE UP (ref 3.5–5)
POTASSIUM SERPL-SCNC: 4.2 MMOL/L — SIGNIFICANT CHANGE UP (ref 3.5–5)
PROT SERPL-MCNC: 5.4 G/DL — LOW (ref 6–8)
RBC # BLD: 4.1 M/UL — LOW (ref 4.2–5.4)
RBC # FLD: 14.6 % — HIGH (ref 11.5–14.5)
SODIUM SERPL-SCNC: 144 MMOL/L — SIGNIFICANT CHANGE UP (ref 135–146)
WBC # BLD: 8.9 K/UL — SIGNIFICANT CHANGE UP (ref 4.8–10.8)
WBC # FLD AUTO: 8.9 K/UL — SIGNIFICANT CHANGE UP (ref 4.8–10.8)

## 2020-06-09 PROCEDURE — 99239 HOSP IP/OBS DSCHRG MGMT >30: CPT

## 2020-06-09 RX ORDER — CEFPODOXIME PROXETIL 100 MG
1 TABLET ORAL
Qty: 8 | Refills: 0
Start: 2020-06-09 | End: 2020-06-12

## 2020-06-09 RX ORDER — CEFPODOXIME PROXETIL 100 MG
200 TABLET ORAL EVERY 12 HOURS
Refills: 0 | Status: DISCONTINUED | OUTPATIENT
Start: 2020-06-09 | End: 2020-06-09

## 2020-06-09 RX ORDER — AMOXICILLIN 250 MG/5ML
750 SUSPENSION, RECONSTITUTED, ORAL (ML) ORAL
Refills: 0 | Status: DISCONTINUED | OUTPATIENT
Start: 2020-06-09 | End: 2020-06-09

## 2020-06-09 RX ORDER — METRONIDAZOLE 500 MG
1 TABLET ORAL
Qty: 8 | Refills: 0
Start: 2020-06-09 | End: 2020-06-12

## 2020-06-09 RX ORDER — AMOXICILLIN 250 MG/5ML
1 SUSPENSION, RECONSTITUTED, ORAL (ML) ORAL
Qty: 8 | Refills: 0
Start: 2020-06-09 | End: 2020-06-12

## 2020-06-09 RX ADMIN — Medication 25 MILLIGRAM(S): at 05:40

## 2020-06-09 RX ADMIN — Medication 40 MILLIGRAM(S): at 05:40

## 2020-06-09 RX ADMIN — Medication 1 MILLIGRAM(S): at 11:21

## 2020-06-09 RX ADMIN — Medication 5000 UNIT(S): at 11:21

## 2020-06-09 RX ADMIN — ATORVASTATIN CALCIUM 40 MILLIGRAM(S): 80 TABLET, FILM COATED ORAL at 11:21

## 2020-06-09 RX ADMIN — Medication 325 MILLIGRAM(S): at 11:21

## 2020-06-09 RX ADMIN — MEROPENEM 100 MILLIGRAM(S): 1 INJECTION INTRAVENOUS at 05:28

## 2020-06-09 RX ADMIN — Medication 200 MILLIGRAM(S): at 11:59

## 2020-06-09 NOTE — PROGRESS NOTE ADULT - SUBJECTIVE AND OBJECTIVE BOX
SUBJECTIVE:    Patient is a 87y old Female who presents with a chief complaint of abdominal pain (09 Jun 2020 00:50)    Currently admitted to medicine with the primary diagnosis of Acute cholecystitis     Today is hospital day 5d. This morning she is resting comfortably in bed and reports no new issues or overnight events.     INTERVAL EVENTS: pt with no complaints, mild pain at site of draining perc choly, likely for d/c today    PAST MEDICAL & SURGICAL HISTORY  High cholesterol  CHF (congestive heart failure)  HTN (hypertension)  No pertinent past medical history  No significant past surgical history      ALLERGIES:  mavis (Rash)  peanuts (Rash)  varicella virus vaccine (Unknown)    MEDICATIONS:  STANDING MEDICATIONS  amoxicillin 750 milliGRAM(s) Oral two times a day  atorvastatin Oral Tab/Cap - Peds 40 milliGRAM(s) Oral daily  cholecalciferol 5000 Unit(s) Oral daily  enoxaparin Injectable 40 milliGRAM(s) SubCutaneous at bedtime  ferrous    sulfate 325 milliGRAM(s) Oral daily  folic acid 1 milliGRAM(s) Oral daily  furosemide    Tablet 40 milliGRAM(s) Oral daily  meropenem  IVPB 500 milliGRAM(s) IV Intermittent every 8 hours  meropenem  IVPB      metolazone 2.5 milliGRAM(s) Oral daily  metoprolol succinate ER 25 milliGRAM(s) Oral daily  senna 2 Tablet(s) Oral at bedtime    PRN MEDICATIONS  aluminum hydroxide/magnesium hydroxide/simethicone Suspension 30 milliLiter(s) Oral every 6 hours PRN    VITALS:   T(F): 98.2  HR: 76  BP: 150/66  RR: 20  SpO2: 93%    LABS:                        11.5   8.90  )-----------( 247      ( 09 Jun 2020 06:16 )             35.9     06-09    144  |  102  |  24<H>  ----------------------------<  104<H>  4.2   |  30  |  0.7    Ca    9.0      09 Jun 2020 06:16    TPro  5.4<L>  /  Alb  2.8<L>  /  TBili  0.3  /  DBili  x   /  AST  31  /  ALT  22  /  AlkPhos  85  06-09                  RADIOLOGY:    PHYSICAL EXAM:  GEN: No acute distress  PULM/CHEST: Clear to auscultation bilaterally, no rales, rhonchi or wheezes   CVS: Regular rate and rhythm, S1-S2, no murmurs  ABD: Soft, non-tender, non-distended, +BS, right draining perc choly 20cc dark brown fluid  EXT: No edema  NEURO: AAOx3    Lee Catheter: SUBJECTIVE:    Patient is a 87y old Female who presents with a chief complaint of abdominal pain (09 Jun 2020 00:50)    Currently admitted to medicine with the primary diagnosis of Acute cholecystitis     Today is hospital day 5d. This morning she is resting comfortably in bed and reports no new issues or overnight events.     INTERVAL EVENTS: pt with no complaints, mild pain at site of draining perc choly, likely for d/c today    PAST MEDICAL & SURGICAL HISTORY  High cholesterol  CHF (congestive heart failure)  HTN (hypertension)  No pertinent past medical history  No significant past surgical history      ALLERGIES:  mavis (Rash)  peanuts (Rash)  varicella virus vaccine (Unknown)    MEDICATIONS:  STANDING MEDICATIONS  amoxicillin 750 milliGRAM(s) Oral two times a day  atorvastatin Oral Tab/Cap - Peds 40 milliGRAM(s) Oral daily  cholecalciferol 5000 Unit(s) Oral daily  enoxaparin Injectable 40 milliGRAM(s) SubCutaneous at bedtime  ferrous    sulfate 325 milliGRAM(s) Oral daily  folic acid 1 milliGRAM(s) Oral daily  furosemide    Tablet 40 milliGRAM(s) Oral daily  meropenem  IVPB 500 milliGRAM(s) IV Intermittent every 8 hours  meropenem  IVPB      metolazone 2.5 milliGRAM(s) Oral daily  metoprolol succinate ER 25 milliGRAM(s) Oral daily  senna 2 Tablet(s) Oral at bedtime    PRN MEDICATIONS  aluminum hydroxide/magnesium hydroxide/simethicone Suspension 30 milliLiter(s) Oral every 6 hours PRN    VITALS:   T(F): 98.2  HR: 76  BP: 150/66  RR: 20  SpO2: 93%    LABS:                        11.5   8.90  )-----------( 247      ( 09 Jun 2020 06:16 )             35.9     144  |  102  |  24<H>  ----------------------------<  104<H>  4.2   |  30  |  0.7    Ca    9.0      09 Jun 2020 06:16    TPro  5.4<L>  /  Alb  2.8<L>  /  TBili  0.3  /  DBili  x   /  AST  31  /  ALT  22  /  AlkPhos  85  06-09    RADIOLOGY:    PHYSICAL EXAM:  GEN: No acute distress  PULM/CHEST: Clear to auscultation bilaterally, no rales, rhonchi or wheezes   CVS: Regular rate and rhythm, S1-S2, no murmurs  ABD: Soft, non-tender, non-distended, +BS, right draining perc choly 20cc dark brown fluid  EXT: No edema  NEURO: AAOx3    Lee Catheter:

## 2020-06-09 NOTE — PROGRESS NOTE ADULT - ATTENDING COMMENTS
patient seen and examined independently on morning rounds for the first time today, chart reviewed and discussed with the medicine resident and agree with the above resident progress note with the following addendum:    sittting up at edge of bed- cholecystostomy draining with 50 cc brown fluid in bag- reports feeling weak and tired today-     PE:  GEN-NAD, AAOx3  PULM- Clear to auscultation bilaterally, fair air entry  CVS- +s1/s2 RRR no murmurs  GI- soft +cholecystostomy drain- ttp diffuse (RUQ>LUQ)  EXT: no edema    labs/radiology reviewed    a/p:  #sepsis- Acute cholecystitis- s/p percutaneous cholecystostomy 6/5  -cont iv abx (merrem)  -f/u bcx and drain cx (fluid +Ecoli and Enterococcus faecium--->pending sensitivities)  -AFB negative from fluid  -monitor cholecystostomy drain and output  -pain control  -covid pcr negative  -monitor wbc and fever curve (wbc trending down 21--->10k today)    DVT/GI ppx  guarded prognosis    patient  lives alone---may need STR upon discharge (patient says she lives alone with hha minimal hours)    FULL CODE
Patient is seen and examined independently at bedside. I agree with the resident's note, history and plan as above. Corrections made where appropriate    All labs, radiologic studies, vitals, orders and medications list reviewed.     PE:  GEN-NAD, AAOx3  PULM- Clear to auscultation bilaterally, b/l air entry  CVS- +s1/s2 RRR no murmurs  GI- soft +cholecystostomy drain- ttp at the drain site  EXT: no edema    labs/radiology reviewed    Sepsis, present on admission due to acute cholecystitis  s/p perc beatris on 6/5 with IR, 50cc brown output  Culture with Ecoli and enterococcus, add Augmentin to current regimen  f/u the type of allergy  to cefepime to guide antibiotic choice for discharge  monitor Fever curve/wbc count    #Progress Note Handoff  Pending (specify): clinical improvement, monitoring drain output and c/w antibiotics     Family discussion: Plan of care discussed with patient, aware and agreeable   Disposition:  from home, may need SNF if IV abx on discharge as patient lives alone and has limited help    Samantha Schofield MD  s. [id number]
Patient is seen and examined independently at bedside. I agree with the resident's note, history and plan as above. Corrections made where appropriate    All labs, radiologic studies, vitals, orders and medications list reviewed.     PAtient feels well, denies any active complaints except for mild discomfort at the drain placement site  She received 1 dose of PO antibiotics while inpt, tolerated well  She is medically optimized for discharge home to follow up with her PMD and surgeon upon discharge, with plans for possible lap tony as OP  patient aware and in agreement with plan

## 2020-06-09 NOTE — PROGRESS NOTE ADULT - SUBJECTIVE AND OBJECTIVE BOX
SARMADCHINA ESPARZA  87y, Female  Allergy: mavis (Rash)  peanuts (Rash)  varicella virus vaccine (Unknown)      LOS  5d    CHIEF COMPLAINT: abdominal pain (09 Jun 2020 11:07)      INTERVAL EVENTS/HPI  - No acute events overnight, biliary cx   Numerous Escherichia coli  Moderate Enterococcus faecium  - T(F): , Max: 98.2 (06-08-20 @ 13:20)  - Denies any worsening symptoms  - Tolerating medication  - WBC Count: 8.90 (06-09-20 @ 06:16)  WBC Count: 8.82 (06-08-20 @ 07:40)  - Creatinine, Serum: 0.7 (06-09-20 @ 06:16)  Creatinine, Serum: 0.8 (06-08-20 @ 07:40)       ROS  General: Denies rigors, nightsweats  HEENT: Denies headache, rhinorrhea, sore throat, eye pain  CV: Denies CP, palpitations  PULM: Denies wheezing, hemoptysis  GI: Denies hematemesis, hematochezia, melena  : Denies discharge, hematuria  MSK: Denies arthralgias, myalgias  SKIN: Denies rash, lesions  NEURO: Denies paresthesias, weakness  PSYCH: Denies depression, anxiety    VITALS:  T(F): 98.2, Max: 98.2 (06-08-20 @ 13:20)  HR: 76  BP: 150/66  RR: 20Vital Signs Last 24 Hrs  T(C): 36.8 (09 Jun 2020 05:00), Max: 36.8 (08 Jun 2020 13:20)  T(F): 98.2 (09 Jun 2020 05:00), Max: 98.2 (08 Jun 2020 13:20)  HR: 76 (09 Jun 2020 05:00) (71 - 76)  BP: 150/66 (09 Jun 2020 05:00) (128/58 - 188/67)  BP(mean): --  RR: 20 (09 Jun 2020 05:00) (19 - 20)  SpO2: 93% (08 Jun 2020 19:38) (93% - 93%)    PHYSICAL EXAM:  Gen: Elderly F NAD, resting in bed  HEENT: Normocephalic, atraumatic  Neck: supple, no lymphadenopathy  CV: Regular rate & regular rhythm  Lungs: decreased BS at bases, no fremitus  Abdomen: Soft, BS present  cholecystostomy  Ext: Warm, well perfused  Neuro: non focal, awake  Skin: no rash, no erythema  Lines: no phlebitis    FH: Non-contributory  Social Hx: Non-contributory    TESTS & MEASUREMENTS:                        11.5   8.90  )-----------( 247      ( 09 Jun 2020 06:16 )             35.9     06-09    144  |  102  |  24<H>  ----------------------------<  104<H>  4.2   |  30  |  0.7    Ca    9.0      09 Jun 2020 06:16    TPro  5.4<L>  /  Alb  2.8<L>  /  TBili  0.3  /  DBili  x   /  AST  31  /  ALT  22  /  AlkPhos  85  06-09    eGFR if Non African American: 78 mL/min/1.73M2 (06-09-20 @ 06:16)  eGFR if African American: 90 mL/min/1.73M2 (06-09-20 @ 06:16)    LIVER FUNCTIONS - ( 09 Jun 2020 06:16 )  Alb: 2.8 g/dL / Pro: 5.4 g/dL / ALK PHOS: 85 U/L / ALT: 22 U/L / AST: 31 U/L / GGT: x               Culture - Acid Fast - Body Fluid w/Smear (collected 06-05-20 @ 13:22)  Source: .Body Fluid percutaneous cholecystostomy    Culture - Body Fluid with Gram Stain (collected 06-05-20 @ 13:22)  Source: .Body Fluid percutaneous cholecystostomy  Gram Stain (06-06-20 @ 01:16):    polymorphonuclear leukocytes seen    Gram Negative Rods seen    Gram positive cocci in pairs seen    by cytocentrifuge  Preliminary Report (06-06-20 @ 19:18):    Numerous Escherichia coli    Moderate Enterococcus faecium  Organism: Escherichia coli  Enterococcus faecium (06-06-20 @ 19:18)  Organism: Enterococcus faecium (06-06-20 @ 19:18)      -  Ampicillin: S <=2 Predicts results to ampicillin/sulbactam, amoxacillin-clavulanate and  piperacillin-tazobactam.      -  Tetra/Doxy: S <=1      -  Vancomycin: S 4      Method Type: COLETTE  Organism: Escherichia coli (06-06-20 @ 19:17)      -  Amikacin: S <=16      -  Amoxicillin/Clavulanic Acid: I 16/8      -  Ampicillin: R >16 These ampicillin results predict results for amoxicillin      -  Ampicillin/Sulbactam: R >16/8 Enterobacter, Citrobacter, and Serratia may develop resistance during prolonged therapy (3-4 days)      -  Aztreonam: S <=4      -  Cefazolin: R >16 Enterobacter, Citrobacter, and Serratia may develop resistance during prolonged therapy (3-4 days)      -  Cefepime: S <=2      -  Cefoxitin: S <=8      -  Ceftriaxone: S <=1 Enterobacter, Citrobacter, and Serratia may develop resistance during prolonged therapy      -  Ciprofloxacin: R 1      -  Ertapenem: S <=0.5      -  Gentamicin: R >8      -  Imipenem: S <=1      -  Levofloxacin: I 1      -  Meropenem: S <=1      -  Piperacillin/Tazobactam: S <=8      -  Tobramycin: R >8      -  Trimethoprim/Sulfamethoxazole: S <=0.5/9.5      Method Type: COLETTE    Culture - Blood (collected 06-05-20 @ 06:41)  Source: .Blood Blood  Preliminary Report (06-06-20 @ 13:01):    No growth to date.    Culture - Blood (collected 06-04-20 @ 17:47)  Source: .Blood Blood  Preliminary Report (06-06-20 @ 01:01):    No growth to date.    Culture - Blood (collected 06-04-20 @ 17:47)  Source: .Blood Blood  Preliminary Report (06-06-20 @ 01:01):    No growth to date.        Lactate, Blood: 1.6 mmol/L (06-05-20 @ 06:41)  Blood Gas Venous - Lactate: 1.2 mmoL/L (06-04-20 @ 18:09)      INFECTIOUS DISEASES TESTING  COVID-19 PCR: NotDetec (06-04-20 @ 20:47)      INFLAMMATORY MARKERS      RADIOLOGY & ADDITIONAL TESTS:  I have personally reviewed the last available Chest xray  CXR      CT      CARDIOLOGY TESTING  12 Lead ECG:   Ventricular Rate 83 BPM    Atrial Rate 83 BPM    P-R Interval 144 ms    QRS Duration 96 ms    Q-T Interval 404 ms    QTC Calculation(Bezet) 474 ms    P Axis 53 degrees    R Axis -11 degrees    T Axis 7 degrees    Diagnosis Line Normal sinus rhythm  Voltage criteria for left ventricular hypertrophy  Nonspecific ST and T wave abnormality  Abnormal ECG    Confirmed by Houston Jackson ([id number]) on 6/5/2020 5:54:04 AM (06-04-20 @ 20:30)      MEDICATIONS  amoxicillin 750 two times a day  atorvastatin Oral Tab/Cap - Peds 40 daily  cholecalciferol 5000 daily  enoxaparin Injectable 40 at bedtime  ferrous    sulfate 325 daily  folic acid 1 daily  furosemide    Tablet 40 daily  meropenem  IVPB 500 every 8 hours  meropenem  IVPB    metolazone 2.5 daily  metoprolol succinate ER 25 daily  senna 2 at bedtime      WEIGHT  Weight (kg): 60.1 (06-05-20 @ 16:49)  Creatinine, Serum: 0.7 mg/dL (06-09-20 @ 06:16)      ANTIBIOTICS:  amoxicillin 750 milliGRAM(s) Oral two times a day  meropenem  IVPB 500 milliGRAM(s) IV Intermittent every 8 hours  meropenem  IVPB          All available historical records have been reviewed

## 2020-06-09 NOTE — PROGRESS NOTE ADULT - ASSESSMENT
87 year old female with a PMH of HTN, CHF, CAD s/p CABG, and osteoporosis presents with abdominal pain for 4 days duration.  patient was doing well until 4 days prior to presentation when started to RUQ pain, moderate in severity 6/10, no radiation episodic, associated with loss of appetite nausea with no vomiting, no diarrhea, no melena, no hematemesis no BRBPR, today she spiked fever, no chills.       INTERVAL EVENTS: pt with no complaints, mild pain at site of draining perc choly, likely for d/c today      #Sepsis secondary to acute cholecystitis s/p percutaneous drainage by IR on 6/5 on [facility]  -Sepsis present on admission with WBC and Fever both resolved  - CT/ US findings suggest cholecystectomy, evaluated by surgery deemed  not a candidate for surgical intervention. s/p percutaneous cholecystotomy by IR 6/5  -drain in place with about 50cc brownish fluid   -fluid +Ecoli and Enterococcus sensitive to meropenum added amoxicilin 750 bid will need Id f/u for duration of antibiotics on d/c  -BC NGTD    #Hypokalemia, resolved  - May benefit from K+ 20 meq PO daily upon discharge    #Hx of CAD; s/p CABG; Hx of HFpEF  - Continue home Medications  - Continue Metolazone/Lasix combo; Giving fluids slowly as above mentioned  - Follow up daily weights and I+O; Avoid salt when diet resumed  - Cardiology: Dr. Mancini    Hx of Iron Deficient Anemia: Continue ferrous sulfate  Hx of Constipation: Continue hjome Senna; Miralax PRN  Hx of Osteoporosis: Bisphosphonate weekly as OP  Hx of Folate and B12 deficiency: Continue supplementation    GI ppx:   Not indicated  DVT ppx:   Lovenox 40mg SubQ  Activity:   As Tolerated   DISPO:  Patient to be discharged when condition(s) optimized. pt lives alone at home    CODE STATUS:   FULL 87 year old female with a PMH of HTN, CHF, CAD s/p CABG, and osteoporosis presents with abdominal pain for 4 days duration.  patient was doing well until 4 days prior to presentation when started to RUQ pain, moderate in severity 6/10, no radiation episodic, associated with loss of appetite nausea with no vomiting, no diarrhea, no melena, no hematemesis no BRBPR, today she spiked fever, no chills.       INTERVAL EVENTS: pt with no complaints, mild discomfort at drain insertion site      #Sepsis secondary to acute cholecystitis s/p percutaneous drainage by IR on 6/5 on [facility]  -Sepsis present on admission with WBC and Fever both resolved  - CT/ US findings suggest cholecystitis, evaluated by surgery deemed  not a candidate for surgical intervention. s/p percutaneous cholecystotomy by IR 6/5  -drain in place with about 50cc brownish fluid   -fluid +Ecoli and Enterococcus sensitive to meropenum added amoxicilin 750 bid   - c/w PO antibiotic regimen as per ID  -BC NGTD    #Hypokalemia, resolved    #Hx of CAD; s/p CABG; Hx of HFpEF  - Continue home Medications  - Continue Metolazone/Lasix combo; Giving fluids slowly as above mentioned  - Follow up daily weights and I+O; Avoid salt when diet resumed  - Cardiology: Dr. Mancini    Hx of Iron Deficient Anemia: Continue ferrous sulfate  Hx of Constipation: Continue hjome Senna; Miralax PRN  Hx of Osteoporosis: Bisphosphonate weekly as OP  Hx of Folate and B12 deficiency: Continue supplementation    GI ppx:   Not indicated  DVT ppx:   Lovenox 40mg SubQ  Activity:   As Tolerated   DISPO:  Patient to be discharged when condition(s) optimized. pt lives alone at home    CODE STATUS:   FULL

## 2020-06-09 NOTE — DISCHARGE NOTE PROVIDER - HOSPITAL COURSE
87 year old female with a PMH of HTN, CHF, CAD s/p CABG, and osteoporosis presents with abdominal pain for 4 days duration.    patient was doing well until 4 days prior to presentation when started to RUQ pain, moderate in severity 6/10, no radiation episodic, associated with loss of appetite nausea with no vomiting, no diarrhea, no melena, no hematemesis no BRBPR, today she spiked fever, no chills.         #Sepsis secondary to acute cholecystitis s/p percutaneous drainage by IR on 6/5 on [facility]    -Sepsis present on admission with WBC and Fever both resolved    - CT/ US findings suggest cholecystectomy, evaluated by surgery deemed  not a candidate for surgical intervention. s/p percutaneous cholecystotomy by IR 6/5    -drain in place with about 50cc brownish fluid on 6-8. With 25cc draining on 6-9. Drain will remain for one month.    -fluid +Ecoli and Enterococcus sensitive to meropenum added amoxicilin 750 bid -will be sent home on - Vantin PO 200mg BID -  Flagyl 500mg BID, and- PO  amoxicillin 875mg BID     - ABX x 4 more days to tolat 7 days post IR drainage.

## 2020-06-09 NOTE — PROGRESS NOTE ADULT - ASSESSMENT
ASSESSMENT  87 year old female with a PMH of HTN, CHF, CAD s/p CABG, and osteoporosis presents with acute cholecystitis s/p cholecystotomy    IMPRESSION  #Acute cholecystitis s/p cholecystotomy    6/5 biliary cx   Numerous Escherichia coli (R fluoro) Moderate Enterococcus faecium (S)    6/5 BCX NG  #Severe sepsis on admission Pulse>90,  WBC>12, GUZMAN  #Abx allergy: cefepime (Unknown)- removed from allergies      RECOMMENDATIONS  - Appears that does not have an allergy to cefepime.   - Vantin PO 200mg BID - please trial while patient is in-patient  - Flagyl 500mg BID  - PO  amoxicillin 875mg BID   - ABX x 7 days post IR drainage    Spectra [id number]

## 2020-06-09 NOTE — DISCHARGE NOTE PROVIDER - NSDCHHHOMEBOUND_GEN_ALL_CORE
[facility] [address] [place] [address]  Phone: [phone]  Fax: [phone], APRN - CNP        July 10, 2019     Patient: Fernanda Mack   YOB: 1962   Date of Visit: 7/10/2019       To Whom It May Concern: It is my medical opinion that Fernanda Mack should remain out of work until re evaluation the week of July 29th. .    If you have any questions or concerns, please don't hesitate to call.     Sincerely,        Tila Fuentes, SHAKIRA - CNP
Other, specify...

## 2020-06-09 NOTE — PROGRESS NOTE ADULT - SUBJECTIVE AND OBJECTIVE BOX
CHINA BAÑUELOS  87y Female   [id number]    Hospital Day: 6  Post Operative Day:4  Procedure:s/p todd tony   Patient is a 87y old  Female who presents with a chief complaint of abdominal pain (08 Jun 2020 16:04)    PAST MEDICAL & SURGICAL HISTORY:  High cholesterol  CHF (congestive heart failure)  HTN (hypertension)  No pertinent past medical history  No significant past surgical history      Events of the Last 24h:  Vital Signs Last 24 Hrs  T(C): 36.6 (08 Jun 2020 20:44), Max: 36.8 (08 Jun 2020 13:20)  T(F): 97.8 (08 Jun 2020 20:44), Max: 98.2 (08 Jun 2020 13:20)  HR: 76 (08 Jun 2020 21:34) (71 - 80)  BP: 131/63 (08 Jun 2020 21:34) (128/58 - 188/67)  BP(mean): --  RR: 20 (08 Jun 2020 20:44) (18 - 20)  SpO2: 93% (08 Jun 2020 19:38) (93% - 93%)        Diet, Clear Liquid:   Supplement Feeding Modality:  Oral  Ensure Clear Cans or Servings Per Day:  1       Frequency:  Three Times a day (06-08-20 @ 14:02)      I&O's Summary    07 Jun 2020 07:01  -  08 Jun 2020 07:00  --------------------------------------------------------  IN: 0 mL / OUT: 60 mL / NET: -60 mL    08 Jun 2020 07:01  -  09 Jun 2020 01:01  --------------------------------------------------------  IN: 0 mL / OUT: 80 mL / NET: -80 mL     I&O's Detail    07 Jun 2020 07:01  -  08 Jun 2020 07:00  --------------------------------------------------------  IN:  Total IN: 0 mL    OUT:    Drain: 60 mL  Total OUT: 60 mL    Total NET: -60 mL      08 Jun 2020 07:01  -  09 Jun 2020 01:01  --------------------------------------------------------  IN:  Total IN: 0 mL    OUT:    Drain: 80 mL  Total OUT: 80 mL    Total NET: -80 mL          MEDICATIONS  (STANDING):  atorvastatin Oral Tab/Cap - Peds 40 milliGRAM(s) Oral daily  cholecalciferol 5000 Unit(s) Oral daily  enoxaparin Injectable 40 milliGRAM(s) SubCutaneous at bedtime  ferrous    sulfate 325 milliGRAM(s) Oral daily  folic acid 1 milliGRAM(s) Oral daily  furosemide    Tablet 40 milliGRAM(s) Oral daily  meropenem  IVPB 500 milliGRAM(s) IV Intermittent every 8 hours  meropenem  IVPB      metolazone 2.5 milliGRAM(s) Oral daily  metoprolol succinate ER 25 milliGRAM(s) Oral daily  senna 2 Tablet(s) Oral at bedtime    MEDICATIONS  (PRN):  aluminum hydroxide/magnesium hydroxide/simethicone Suspension 30 milliLiter(s) Oral every 6 hours PRN Dyspepsia      PHYSICAL EXAM:    GENERAL: NAD    HEENT: NCAT    CHEST/LUNGS: CTAB    HEART: RRR,  No murmurs, rubs, or gallops    ABDOMEN: SNTND +BS, perc tony drain in place     EXTREMITIES:  FROM, No clubbing, cyanosis, or edema, palpable pulse    NEURO: No focal neurological deficits    SKIN: No rashes or lesions    INCISION/WOUNDS:                          11.9   8.82  )-----------( 244      ( 08 Jun 2020 07:40 )             36.6        CBC Full  -  ( 08 Jun 2020 07:40 )  WBC Count : 8.82 K/uL  RBC Count : 4.16 M/uL  Hemoglobin : 11.9 g/dL  Hematocrit : 36.6 %  Platelet Count - Automated : 244 K/uL  Mean Cell Volume : 88.0 fL  Mean Cell Hemoglobin : 28.6 pg  Mean Cell Hemoglobin Concentration : 32.5 g/dL  Auto Neutrophil # : 6.02 K/uL  Auto Lymphocyte # : 1.20 K/uL  Auto Monocyte # : 1.08 K/uL  Auto Eosinophil # : 0.22 K/uL  Auto Basophil # : 0.04 K/uL  Auto Neutrophil % : 68.3 %  Auto Lymphocyte % : 13.6 %  Auto Monocyte % : 12.2 %  Auto Eosinophil % : 2.5 %  Auto Basophil % : 0.5 %               144   |  98    |  29                 Ca: 9.4    BMP:   ----------------------------< 111    Mg: x     (06-08-20 @ 07:40)             3.2    |  32    | 0.8                Ph: x        LFT:     TPro: 6.1 / Alb: 3.1 / TBili: 0.4 / DBili: x / AST: 36 / ALT: 28 / AlkPhos: 99   (06-08-20 @ 07:40)    LIVER FUNCTIONS - ( 08 Jun 2020 07:40 )  Alb: 3.1 g/dL / Pro: 6.1 g/dL / ALK PHOS: 99 U/L / ALT: 28 U/L / AST: 36 U/L / GGT: x

## 2020-06-09 NOTE — DISCHARGE NOTE PROVIDER - CARE PROVIDER_API CALL
Shayy Mancini  Diagnostic Radiology  [address] [place] [id number]  Phone: [phone]  Fax: [phone]  Follow Up Time:

## 2020-06-09 NOTE — PROGRESS NOTE ADULT - REASON FOR ADMISSION
abdominal pain
abdominal pain
Acute cholecystitis
abdominal pain

## 2020-06-09 NOTE — DISCHARGE NOTE PROVIDER - NSDCMRMEDTOKEN_GEN_ALL_CORE_FT
alendronate 70 mg oral tablet: 1 tab(s) orally once a week  folic acid 1 mg oral tablet: 1 tab(s) orally once a day  Fosamax 70 mg oral tablet: 1 tab(s) orally once a week  furosemide 40 mg oral tablet: 1 tab(s) orally once a day  Lipitor 40 mg oral tablet: 1 tab(s) orally once a day  metOLazone 2.5 mg oral tablet: 1 tab(s) orally once a day  Toprol-XL 25 mg oral tablet, extended release: 1 tab(s) orally once a day  Vitamin D3:

## 2020-06-10 LAB
CULTURE RESULTS: SIGNIFICANT CHANGE UP
METHOD TYPE: SIGNIFICANT CHANGE UP
SPECIMEN SOURCE: SIGNIFICANT CHANGE UP

## 2020-06-11 DIAGNOSIS — K81.0 ACUTE CHOLECYSTITIS: ICD-10-CM

## 2020-06-11 DIAGNOSIS — E87.6 HYPOKALEMIA: ICD-10-CM

## 2020-06-11 DIAGNOSIS — R65.20 SEVERE SEPSIS WITHOUT SEPTIC SHOCK: ICD-10-CM

## 2020-06-11 DIAGNOSIS — E53.8 DEFICIENCY OF OTHER SPECIFIED B GROUP VITAMINS: ICD-10-CM

## 2020-06-11 DIAGNOSIS — D50.9 IRON DEFICIENCY ANEMIA, UNSPECIFIED: ICD-10-CM

## 2020-06-11 DIAGNOSIS — I25.10 ATHEROSCLEROTIC HEART DISEASE OF NATIVE CORONARY ARTERY WITHOUT ANGINA PECTORIS: ICD-10-CM

## 2020-06-11 DIAGNOSIS — I11.0 HYPERTENSIVE HEART DISEASE WITH HEART FAILURE: ICD-10-CM

## 2020-06-11 DIAGNOSIS — Z95.1 PRESENCE OF AORTOCORONARY BYPASS GRAFT: ICD-10-CM

## 2020-06-11 DIAGNOSIS — A41.51 SEPSIS DUE TO ESCHERICHIA COLI [E. COLI]: ICD-10-CM

## 2020-06-11 DIAGNOSIS — R10.9 UNSPECIFIED ABDOMINAL PAIN: ICD-10-CM

## 2020-06-11 DIAGNOSIS — I50.32 CHRONIC DIASTOLIC (CONGESTIVE) HEART FAILURE: ICD-10-CM

## 2020-06-11 DIAGNOSIS — M81.0 AGE-RELATED OSTEOPOROSIS WITHOUT CURRENT PATHOLOGICAL FRACTURE: ICD-10-CM

## 2020-06-13 LAB
CULTURE RESULTS: SIGNIFICANT CHANGE UP
ORGANISM # SPEC MICROSCOPIC CNT: SIGNIFICANT CHANGE UP
SPECIMEN SOURCE: SIGNIFICANT CHANGE UP

## 2020-07-02 PROBLEM — I50.9 HEART FAILURE, UNSPECIFIED: Chronic | Status: ACTIVE | Noted: 2020-06-04

## 2020-07-02 PROBLEM — E78.00 PURE HYPERCHOLESTEROLEMIA, UNSPECIFIED: Chronic | Status: ACTIVE | Noted: 2020-06-04

## 2020-07-02 PROBLEM — Z00.00 ENCOUNTER FOR PREVENTIVE HEALTH EXAMINATION: Status: ACTIVE | Noted: 2020-07-02

## 2020-07-02 PROBLEM — I10 ESSENTIAL (PRIMARY) HYPERTENSION: Chronic | Status: ACTIVE | Noted: 2020-06-04

## 2020-07-08 ENCOUNTER — APPOINTMENT (OUTPATIENT)
Dept: GASTROENTEROLOGY | Facility: CLINIC | Age: 85
End: 2020-07-08
Payer: MEDICARE

## 2020-07-08 DIAGNOSIS — Z78.9 OTHER SPECIFIED HEALTH STATUS: ICD-10-CM

## 2020-07-08 DIAGNOSIS — Z86.39 PERSONAL HISTORY OF OTHER ENDOCRINE, NUTRITIONAL AND METABOLIC DISEASE: ICD-10-CM

## 2020-07-08 DIAGNOSIS — D68.9 COAGULATION DEFECT, UNSPECIFIED: ICD-10-CM

## 2020-07-08 DIAGNOSIS — R93.89 ABNORMAL FINDINGS ON DIAGNOSTIC IMAGING OF OTHER SPECIFIED BODY STRUCTURES: ICD-10-CM

## 2020-07-08 DIAGNOSIS — Z95.1 PRESENCE OF AORTOCORONARY BYPASS GRAFT: ICD-10-CM

## 2020-07-08 DIAGNOSIS — K81.9 CHOLECYSTITIS, UNSPECIFIED: ICD-10-CM

## 2020-07-08 DIAGNOSIS — Z86.79 PERSONAL HISTORY OF OTHER DISEASES OF THE CIRCULATORY SYSTEM: ICD-10-CM

## 2020-07-08 DIAGNOSIS — Z86.2 PERSONAL HISTORY OF DISEASES OF THE BLOOD AND BLOOD-FORMING ORGANS AND CERTAIN DISORDERS INVOLVING THE IMMUNE MECHANISM: ICD-10-CM

## 2020-07-08 DIAGNOSIS — Z80.9 FAMILY HISTORY OF MALIGNANT NEOPLASM, UNSPECIFIED: ICD-10-CM

## 2020-07-08 PROCEDURE — 99203 OFFICE O/P NEW LOW 30 MIN: CPT | Mod: 95

## 2020-07-08 RX ORDER — ATORVASTATIN CALCIUM 40 MG/1
40 TABLET, FILM COATED ORAL
Refills: 0 | Status: ACTIVE | COMMUNITY

## 2020-07-08 RX ORDER — PNV NO.95/FERROUS FUM/FOLIC AC 28MG-0.8MG
TABLET ORAL
Refills: 0 | Status: ACTIVE | COMMUNITY

## 2020-07-08 RX ORDER — ALENDRONATE SODIUM 70 MG/1
70 TABLET ORAL
Refills: 0 | Status: ACTIVE | COMMUNITY

## 2020-07-08 RX ORDER — UBIDECARENONE 10 MG
10 CAPSULE ORAL
Refills: 0 | Status: ACTIVE | COMMUNITY

## 2020-07-08 RX ORDER — METOPROLOL TARTRATE 25 MG/1
25 TABLET, FILM COATED ORAL
Refills: 0 | Status: ACTIVE | COMMUNITY

## 2020-07-08 RX ORDER — AMLODIPINE BESYLATE 5 MG/1
5 TABLET ORAL
Refills: 0 | Status: ACTIVE | COMMUNITY

## 2020-07-08 RX ORDER — FUROSEMIDE 40 MG/1
40 TABLET ORAL
Refills: 0 | Status: ACTIVE | COMMUNITY

## 2020-07-08 RX ORDER — FERROUS SULFATE 325(65) MG
325 TABLET ORAL
Refills: 0 | Status: ACTIVE | COMMUNITY

## 2020-07-08 NOTE — ASSESSMENT
[FreeTextEntry1] : Patient is an 87-year-old female that was recently admitted to [facility] for abdominal pain found to have cholecystitis.  Patient had percutaneous drainage and catheter placed by interventional radiology.  Patient was also followed by surgical team and noted to not be a candidate for gallbladder removal.  Patient referred for evaluation as percutaneous tube is no longer draining.  Family does not have established follow-up with interventional radiology and when trying to contact them was told that they need a prescription.  Patient feels well with no abdominal pain, jaundice, fever, chills.  Last admission patient did not have LFT elevation nor ductal abnormality to suggest choledocholithiasis. will refer to IR for drain removal. encouraged follow up with surgery.\par \par Percutaneous drain needs removal/ No Choledocholithiasis\par - Referral to IR\par - follow up with us as needed

## 2020-07-13 ENCOUNTER — INPATIENT (INPATIENT)
Facility: HOSPITAL | Age: 85
LOS: 3 days | Discharge: HOME HEALTH | End: 2020-07-17
Attending: HOSPITALIST | Admitting: HOSPITALIST
Payer: MEDICARE

## 2020-07-13 VITALS
TEMPERATURE: 100 F | HEART RATE: 110 BPM | OXYGEN SATURATION: 100 % | RESPIRATION RATE: 19 BRPM | DIASTOLIC BLOOD PRESSURE: 76 MMHG | SYSTOLIC BLOOD PRESSURE: 193 MMHG

## 2020-07-13 DIAGNOSIS — Z43.4 ENCOUNTER FOR ATTENTION TO OTHER ARTIFICIAL OPENINGS OF DIGESTIVE TRACT: Chronic | ICD-10-CM

## 2020-07-13 DIAGNOSIS — Z95.1 PRESENCE OF AORTOCORONARY BYPASS GRAFT: Chronic | ICD-10-CM

## 2020-07-13 LAB
ALBUMIN SERPL ELPH-MCNC: 3.7 G/DL — SIGNIFICANT CHANGE UP (ref 3.5–5.2)
ALP SERPL-CCNC: 277 U/L — HIGH (ref 30–115)
ALT FLD-CCNC: 324 U/L — HIGH (ref 0–41)
ANION GAP SERPL CALC-SCNC: 13 MMOL/L — SIGNIFICANT CHANGE UP (ref 7–14)
APTT BLD: 31.7 SEC — SIGNIFICANT CHANGE UP (ref 27–39.2)
AST SERPL-CCNC: 542 U/L — HIGH (ref 0–41)
BASOPHILS # BLD AUTO: 0.02 K/UL — SIGNIFICANT CHANGE UP (ref 0–0.2)
BASOPHILS NFR BLD AUTO: 0.1 % — SIGNIFICANT CHANGE UP (ref 0–1)
BILIRUB DIRECT SERPL-MCNC: 2 MG/DL — HIGH (ref 0–0.2)
BILIRUB INDIRECT FLD-MCNC: 0.4 MG/DL — SIGNIFICANT CHANGE UP (ref 0.2–1.2)
BILIRUB SERPL-MCNC: 2.4 MG/DL — HIGH (ref 0.2–1.2)
BUN SERPL-MCNC: 11 MG/DL — SIGNIFICANT CHANGE UP (ref 10–20)
CALCIUM SERPL-MCNC: 9.7 MG/DL — SIGNIFICANT CHANGE UP (ref 8.5–10.1)
CHLORIDE SERPL-SCNC: 100 MMOL/L — SIGNIFICANT CHANGE UP (ref 98–110)
CO2 SERPL-SCNC: 29 MMOL/L — SIGNIFICANT CHANGE UP (ref 17–32)
CREAT SERPL-MCNC: 0.6 MG/DL — LOW (ref 0.7–1.5)
EOSINOPHIL # BLD AUTO: 0 K/UL — SIGNIFICANT CHANGE UP (ref 0–0.7)
EOSINOPHIL NFR BLD AUTO: 0 % — SIGNIFICANT CHANGE UP (ref 0–8)
GLUCOSE SERPL-MCNC: 147 MG/DL — HIGH (ref 70–99)
HCT VFR BLD CALC: 36.3 % — LOW (ref 37–47)
HGB BLD-MCNC: 11.5 G/DL — LOW (ref 12–16)
IMM GRANULOCYTES NFR BLD AUTO: 0.5 % — HIGH (ref 0.1–0.3)
INR BLD: 1.13 RATIO — SIGNIFICANT CHANGE UP (ref 0.65–1.3)
LACTATE SERPL-SCNC: 1.3 MMOL/L — SIGNIFICANT CHANGE UP (ref 0.7–2)
LYMPHOCYTES # BLD AUTO: 0.43 K/UL — LOW (ref 1.2–3.4)
LYMPHOCYTES # BLD AUTO: 2.5 % — LOW (ref 20.5–51.1)
MCHC RBC-ENTMCNC: 28.5 PG — SIGNIFICANT CHANGE UP (ref 27–31)
MCHC RBC-ENTMCNC: 31.7 G/DL — LOW (ref 32–37)
MCV RBC AUTO: 89.9 FL — SIGNIFICANT CHANGE UP (ref 81–99)
MONOCYTES # BLD AUTO: 1.45 K/UL — HIGH (ref 0.1–0.6)
MONOCYTES NFR BLD AUTO: 8.3 % — SIGNIFICANT CHANGE UP (ref 1.7–9.3)
NEUTROPHILS # BLD AUTO: 15.43 K/UL — HIGH (ref 1.4–6.5)
NEUTROPHILS NFR BLD AUTO: 88.6 % — HIGH (ref 42.2–75.2)
NRBC # BLD: 0 /100 WBCS — SIGNIFICANT CHANGE UP (ref 0–0)
PLATELET # BLD AUTO: 303 K/UL — SIGNIFICANT CHANGE UP (ref 130–400)
POTASSIUM SERPL-MCNC: 3.7 MMOL/L — SIGNIFICANT CHANGE UP (ref 3.5–5)
POTASSIUM SERPL-SCNC: 3.7 MMOL/L — SIGNIFICANT CHANGE UP (ref 3.5–5)
PROT SERPL-MCNC: 7.2 G/DL — SIGNIFICANT CHANGE UP (ref 6–8)
PROTHROM AB SERPL-ACNC: 13 SEC — HIGH (ref 9.95–12.87)
RBC # BLD: 4.04 M/UL — LOW (ref 4.2–5.4)
RBC # FLD: 15.5 % — HIGH (ref 11.5–14.5)
SODIUM SERPL-SCNC: 142 MMOL/L — SIGNIFICANT CHANGE UP (ref 135–146)
WBC # BLD: 17.41 K/UL — HIGH (ref 4.8–10.8)
WBC # FLD AUTO: 17.41 K/UL — HIGH (ref 4.8–10.8)

## 2020-07-13 PROCEDURE — 76705 ECHO EXAM OF ABDOMEN: CPT | Mod: 26

## 2020-07-13 PROCEDURE — 71045 X-RAY EXAM CHEST 1 VIEW: CPT | Mod: 26

## 2020-07-13 PROCEDURE — 93010 ELECTROCARDIOGRAM REPORT: CPT

## 2020-07-13 PROCEDURE — 74181 MRI ABDOMEN W/O CONTRAST: CPT | Mod: 26

## 2020-07-13 PROCEDURE — 99285 EMERGENCY DEPT VISIT HI MDM: CPT

## 2020-07-13 PROCEDURE — 99223 1ST HOSP IP/OBS HIGH 75: CPT

## 2020-07-13 PROCEDURE — 74177 CT ABD & PELVIS W/CONTRAST: CPT | Mod: 26

## 2020-07-13 RX ORDER — ONDANSETRON 8 MG/1
4 TABLET, FILM COATED ORAL ONCE
Refills: 0 | Status: COMPLETED | OUTPATIENT
Start: 2020-07-13 | End: 2020-07-13

## 2020-07-13 RX ORDER — ACETAMINOPHEN 500 MG
650 TABLET ORAL ONCE
Refills: 0 | Status: COMPLETED | OUTPATIENT
Start: 2020-07-13 | End: 2020-07-13

## 2020-07-13 RX ORDER — MORPHINE SULFATE 50 MG/1
1 CAPSULE, EXTENDED RELEASE ORAL EVERY 4 HOURS
Refills: 0 | Status: DISCONTINUED | OUTPATIENT
Start: 2020-07-13 | End: 2020-07-17

## 2020-07-13 RX ORDER — SODIUM CHLORIDE 9 MG/ML
2000 INJECTION, SOLUTION INTRAVENOUS ONCE
Refills: 0 | Status: COMPLETED | OUTPATIENT
Start: 2020-07-13 | End: 2020-07-13

## 2020-07-13 RX ORDER — MEROPENEM 1 G/30ML
1000 INJECTION INTRAVENOUS ONCE
Refills: 0 | Status: COMPLETED | OUTPATIENT
Start: 2020-07-13 | End: 2020-07-13

## 2020-07-13 RX ORDER — CHLORHEXIDINE GLUCONATE 213 G/1000ML
1 SOLUTION TOPICAL
Refills: 0 | Status: DISCONTINUED | OUTPATIENT
Start: 2020-07-13 | End: 2020-07-17

## 2020-07-13 RX ORDER — METOPROLOL TARTRATE 50 MG
25 TABLET ORAL DAILY
Refills: 0 | Status: DISCONTINUED | OUTPATIENT
Start: 2020-07-13 | End: 2020-07-17

## 2020-07-13 RX ORDER — MORPHINE SULFATE 50 MG/1
4 CAPSULE, EXTENDED RELEASE ORAL ONCE
Refills: 0 | Status: DISCONTINUED | OUTPATIENT
Start: 2020-07-13 | End: 2020-07-13

## 2020-07-13 RX ORDER — SODIUM CHLORIDE 9 MG/ML
1000 INJECTION, SOLUTION INTRAVENOUS
Refills: 0 | Status: DISCONTINUED | OUTPATIENT
Start: 2020-07-13 | End: 2020-07-14

## 2020-07-13 RX ORDER — ONDANSETRON 8 MG/1
4 TABLET, FILM COATED ORAL EVERY 8 HOURS
Refills: 0 | Status: DISCONTINUED | OUTPATIENT
Start: 2020-07-13 | End: 2020-07-17

## 2020-07-13 RX ADMIN — Medication 650 MILLIGRAM(S): at 14:03

## 2020-07-13 RX ADMIN — SODIUM CHLORIDE 2000 MILLILITER(S): 9 INJECTION, SOLUTION INTRAVENOUS at 13:22

## 2020-07-13 RX ADMIN — ONDANSETRON 4 MILLIGRAM(S): 8 TABLET, FILM COATED ORAL at 13:22

## 2020-07-13 RX ADMIN — SODIUM CHLORIDE 75 MILLILITER(S): 9 INJECTION, SOLUTION INTRAVENOUS at 22:55

## 2020-07-13 RX ADMIN — MEROPENEM 100 MILLIGRAM(S): 1 INJECTION INTRAVENOUS at 14:03

## 2020-07-13 RX ADMIN — MORPHINE SULFATE 4 MILLIGRAM(S): 50 CAPSULE, EXTENDED RELEASE ORAL at 13:22

## 2020-07-13 NOTE — ED PROVIDER NOTE - PRIOR HOSPITAL/ED VISITS SUMMARY FREE TEXT FOR MDM OBTAINED AND REVIEWED OLD RECORDS QUESTION
admitted for cholecysitis requiring drain placed by IR. Cultures showed bacteria susceptible to meropenem.

## 2020-07-13 NOTE — H&P ADULT - HISTORY OF PRESENT ILLNESS
87 year old female with PMHx of CAD s/p CABG, CHF, osteoporosis, recently diagnosed cholecystitis s/p cholecystectomy (6/5) presenting due to abdominal pain of 1 week duration. Patient has cholecystectomy bag that has had gradually decreasing output since discharge, stopped producing fluid 1 week ago. Around that time patient started experiencing RUQ abdominal pain, today became severe associated with nausea, loss of appetite and subjective fevers. Denies any cough, chest pain, runny nose, dysuria.   Daughter (former RN), flushed cholecystectomy tube with no return. Was also flushed by ED resident, no output.

## 2020-07-13 NOTE — CONSULT NOTE ADULT - ASSESSMENT
88 yo  F Hx of CAD SP CABG, Mitral regurgitation?, Hx of recent acute cholecystitis SP perc cholecystotomy admitted for sepsis concerns for acute cholangitis.  Currently there is no clear evidence of choledocholithiasis (CBD 9mm within normal limit for age, no choledocholithiasis on US of CT).    Rec:  - IV hydration  - Check lipase x1  - Trend LFTs  - Start IV Abx (G- + Anaerobe coverage)  - Clarify COVID status  - MRCP overnight  - Clear liquid diet  - Recall IR for eval for cholecystotomy  - ERCP on emergent basis if develops suppurative cholangitis

## 2020-07-13 NOTE — H&P ADULT - NSICDXPASTMEDICALHX_GEN_ALL_CORE_FT
PAST MEDICAL HISTORY:  CHF (congestive heart failure)     Coronary artery disease     High cholesterol     HTN (hypertension)

## 2020-07-13 NOTE — ED PROVIDER NOTE - CLINICAL SUMMARY MEDICAL DECISION MAKING FREE TEXT BOX
86 yo F with PMH of CHF, CAD sp CABG, HLD, HTN, cholecystitis sp biliary drain presented to ED for RUQ pain nausea and vomiting. Patient was found to have cholelithiasis. GI consulted who will perform MRCP. Patient admitted for further management.

## 2020-07-13 NOTE — ED PROVIDER NOTE - PHYSICAL EXAMINATION
CONSTITUTIONAL: well developed, nontoxic appearing, in no acute distress, speaking in full sentences  SKIN: warm, dry, no rash, cap refill < 2 seconds  HEENT: normocephalic, atraumatic, no conjunctival erythema, moist mucous membranes, patent airway  NECK: supple  CV: tachycardic rate, regular rhythm, 2+ radial pulses bilaterally  RESP: no wheezes, no rales, no rhonchi, normal work of breathing  ABD: soft, diffuse abd tenderness, RUQ drain in place without surrounding erythema/warmth to touch/swelling, nondistended, no rebound, no guarding  MSK: normal ROM, no cyanosis, no edema  NEURO: alert, oriented, grossly unremarkable  PSYCH: cooperative, appropriate CONSTITUTIONAL: well developed, nontoxic appearing, in no acute distress, speaking in full sentences  SKIN: warm, dry, no rash, cap refill < 2 seconds  HEENT: normocephalic, atraumatic, no conjunctival erythema, moist mucous membranes, patent airway  NECK: supple  CV: tachycardic rate, regular rhythm, 2+ radial pulses bilaterally  RESP: no wheezes, no rales, no rhonchi, normal work of breathing  ABD: soft, diffuse abd tenderness, RUQ drain in place without surrounding erythema/warmth to touch/swelling, no output in bag, nondistended, no rebound, no guarding  MSK: normal ROM, no cyanosis, no edema  NEURO: alert, oriented, grossly unremarkable  PSYCH: cooperative, appropriate

## 2020-07-13 NOTE — ED PROVIDER NOTE - NS ED ROS FT
GEN:  no fever, no chills  NEURO:  no headache, no dizziness  ENT: no sore throat, no runny nose  CV:  no chest pain, no palpitations  RESP:  no sob, no cough  GI:  + nausea, + vomiting, + abdominal pain, no diarrhea  :  no dysuria, no urinary frequency, no hematuria  MSK:  no joint pain, no edema  SKIN:  no rash, no bruising  HEME: no hematochezia, no melena

## 2020-07-13 NOTE — H&P ADULT - ATTENDING COMMENTS
I saw and evaluated the patient. I have reviewed and agree with the findings and plan of care as documented above in the resident’s note (unless indicated differently below). Any necessary changes were made in the body of the text.    88 yo F pt w/ relevant hx of CABG (patent grafts per cath in 2018), HFpEF (LVEF of 55-60% per ECHO in 2018) and recently diagnosed cholecystitis (s/p perc-cholecystostomy on 06/05/2020 - no output x 1 week now - was on tx w/ meropenem to cover for E. coli and Enterococcus faecium which grew on Cx). P/w RUQ abdominal pain, moderately severe, constant, achy, radiating to the back (pain was initially generalized but now present only in the RUQ as per pt - milder since presentation). No alleviating or aggravating factors identified. Associated w/ NBNB emesis x 1, anorexia and subjective fevers.     ROS:  Constitutional: +fevers (subjective); no chills; +generalized weakness  Eyes: no conjunctivitis; no itching  ENT: no dysphagia; no odynophagia  CVS: no PND; no orthopnea; no chest pain  Resp: no SOB; no coughing  GI: +nausea; +vomiting; no diarrhea; +abd pain; +anorexia  : no dysuria; no hematuria  MSK: no myalgias; no arthralgias  Skin: no rashes; no ulcers  Neuro: no focal weakness; no headache  All other systems reviewed and are negative    PMHx, home medications, SurHx, FHx and Social history as above in the corresponding sections of the note - reviewed and edited where appropriate    Exam:  Vitals: BP = 138/63; P = 99; T = 99.7; RR = 18; SpO2 > 95 on 2 L/min via NC  General: appears stated age; cooperative; pleasant; chronically ill appearing  Eyes: anicteric sclera; moist conjunctiva w/ no lid lag; PERRL  HENT: NC/AT; clear oropharynx w/ moist mucous membranes  Neck: supple w/ FROM; trachea midline  Lungs: cta b/l with no tachypnea, accessory muscle usage, wheezing, rhonci or rales  CVS: RRR; S1 and S2 w/o MRGs  Abd: BS+; soft; tender to palpation in RUQ; no masses or HSM  Ext: no peripheral edema; pulses 2+ b/l  Skin: normal temp, turgor and texture; no rashes, ulcers or nodules  Neuro: CN II-XII intact; str and sensation grossly intact  Psych: appropriate affect; alert and oriented to person, place and situation    Labs significant for WBC 11.8, H&H 9.8/32.1, gluc 109, tbili 2.5, AST//235, alk phos 229  Covid-19 PCR not detected  CT abd/pelvis w/ IV: s/p transhepatic cholecystostomy; gallbladder compressed; decrease in pericholecystic inflammatory changes; pigtail of catheter within gallbladder; calcified stones and gallbladder wall thickening noted  [place] (2018): LVEF of 55-60% w/ moderately decreased mitral leaflet mobility and severe mitral stenosis; s/p mitral annular ring insertion  EKG: sinus tachycardia; qTC 473    Assessment:  (1) Acute cholangitis - pt was septic on presentation  --- Cx results from the past showed E. coli and E. faecium sensitive to sherwin  (2) Recently diagnosed w/ cholecystitis (s/p cholecystostomy):  --- Drain in place (draining); bladder decompressed on imaging  (3) Normocytic anemia - AOCD + hx of Fe deficiency anemia  (4) Hx of 2vCAD s/p CABG (grafts patent as of cath in 2018)  (5) HFpEF - as per [place] in 2018 [not in acute exacerbation at this time]    Plan:  (1) BCx in lab: f/u results  (2) Hydration  (3) Analgesics  (4) Abx - sherwin as ordered  (5) Trend LFT's; MRCP  (6) ?ERCP - GI on board (recs appreciated)  --- NPO in anticipation of intervention  --- Cardio pre-procedural eval  (7) Medications as dosed  (8) Supportive care  (9) Dispo: acutely ill; not d/c ready (tentative planning in 72 hrs)    Code status: full code

## 2020-07-13 NOTE — ED PROVIDER NOTE - PROGRESS NOTE DETAILS
TC: 88 yo F with PMHx of CHF, CAD s/p CABG, HLD, HTN, cholecystitis s/p recent biliary drain who presents with RUQ pain and nbnb vomiting. Here in ED, tachy 110s, oral temp 100, will obtain rectal temp. Abd diffusely tender. Ordered labs, ekg, urine, cxr, US. Given IVF, abx, morphine, zofran. Will reassess. TC: Sepsis suspected at this time. Given 30cc/kg IVF of ideal body weight. Ordered meropenem empirically. TC: 86 yo F with PMHx of CHF, CAD s/p CABG, HLD, HTN, cholecystitis s/p recent biliary drain who presents with RUQ pain and nbnb vomiting. Here in ED, tachy 110s, oral temp 100, will obtain rectal temp. Abd diffusely tender. Biliary site clean/dry/intact, no output in bag. Ordered labs, ekg, urine, cxr, US, CT. Given IVF, abx, morphine, zofran. Will reassess. TC: Labs notable for WBC 17, Tbili 2.4, Dbili 2.0, AST/ALT 500s/300s (previously normal). Pending US read, CT, cxr. Attending Note:   86 yo FA PMH cholecystitis managed with a drain placed by IR who p/w vomiting, abd pain and subjective fever since this morning. Pain is diffuse. On exam: PT in NAD. Cardiac- S1S2. Lungs- CTAB. Abdomen soft NTND. Extremities- No LE edema. Neuro- grossly intact. Plan: Obtain lab work and CT scan for eval of worsening infection vs. obstruction TC: US shows TC: US and CT abd shows cholelithiasis s/p percutaneous cholecystostomy in place with decrease in pericholecystic inflammation, CBD 9mm which is upper limits of normal for pt. Spoke with GI who will see pt. TC: US and CT abd shows cholelithiasis s/p percutaneous cholecystostomy in place with decrease in pericholecystic inflammation, CBD 9mm which is upper limits of normal for pt. Spoke with GI who will see pt. HR improved to 90s. Rapid covid sent out for possible ERCP/MRCP. TC: Called by GI who requested IR c/s for possible recurrent cholecystitis. Spoke with IR Dr. Valente who will see pt. TC: Spoke with GI who will perform MRCP, stated that pt does not need ICU at this time. Will admit. TC: Spoke with GI who will perform MRCP, stated that pt does not need ICU at this time. Biliary drain flushing well. Will admit. TC: GI requesting cardio c/s for clearance for possible ERCP tomorrow if MRCP+. Updated MAR Dr. Cline, who will place inpt c/s.

## 2020-07-13 NOTE — ED ADULT NURSE NOTE - OBJECTIVE STATEMENT
Patient presents with RUQ abdominal pain, N/V/D, and fever. Patient had biliary stent placed 6/20 and is having pain to the area. No blood present in emesis or stool.

## 2020-07-13 NOTE — ED PROVIDER NOTE - OBJECTIVE STATEMENT
86 yo F with PMHx of CHF, CAD s/p CABG, HLD, HTN who presents with acute onset of moderate/severe, constant, RUQ pain radiating to back which started this AM associated with nbnb vomiting. No alleviating/aggravating factors. No fever, chills, cp, sob, dysuria, diarrhea. Per chart review, pt was admitted 6/4-6/9 for acute cholecystitis with fluid cx growing e.coli and enterococcus sensitive to meropenem. Deemed not a surgical candidate and underwent percutaneous drain by IR on 6/5. Had telehealth f/u with GI 7/8, currently pending IR f/u for which she was previously planned to have biliary drain removed as there has been no output. Full code per daughter/HCP Nia Beauchamp ([phone]).    Cardio: Dr. Mancini  GI: Dr. Ryan  IR: Dr. Spencer 88 yo F with PMHx of CHF, CAD s/p CABG, HLD, HTN who presents with acute onset of moderate/severe, constant, RUQ pain radiating to back which started this AM associated with nbnb vomiting. No alleviating/aggravating factors. No fever, chills, cp, sob, dysuria, diarrhea. Per chart review, pt was admitted 6/4-6/9 for acute cholecystitis with fluid cx growing e.coli and enterococcus faecium sensitive to meropenem. Deemed not a surgical candidate and underwent percutaneous drain by IR on 6/5. Had telehealth f/u with GI 7/8, currently pending IR f/u for which she was previously planned to have biliary drain removed as there has been no output. Full code per daughter/HCP Nia Beauchamp ([phone]).    Cardio: Dr. Mancini  GI: Dr. Ryan  IR: Dr. Spencer 86 yo F with PMHx of CHF, CAD s/p CABG, HLD, HTN who presents with acute onset of moderate/severe, constant, RUQ pain radiating to back which started this AM associated with nbnb vomiting. No alleviating/aggravating factors. No fever, chills, cp, sob, dysuria, diarrhea. Per chart review, pt was admitted 6/4-6/9 for acute cholecystitis with fluid cx growing e.coli and enterococcus faecium sensitive to meropenem. Deemed not a surgical candidate and underwent percutaneous drain by IR on 6/5. Had telehealth f/u with GI 7/8, currently pending IR f/u for biliary drain removal as there has been no output for awhile. Full code per daughter/HCP Nia Beauchamp ([phone]).    Cardio: Dr. Mancini  GI: Dr. Ryan  IR: Dr. Spencer

## 2020-07-13 NOTE — CONSULT NOTE ADULT - SUBJECTIVE AND OBJECTIVE BOX
Gastroenterology/Hepatology Consultation    For questions and inquiries please page [phone].  For urgent matters or after 5pm and on weekends please page the fellow on call through the GI paging system.    87y Female with suspected cholangitis  Patient is a 87y old  Female who presents with a chief complaint of RUQ pain and fever    GI Hx:  88 yo F with PMHx of CHF, CAD s/p CABG, HLD, HTN presenting to the ED for acute onset of moderate/severe, constant, RUQ pain radiating to back associated with NBNB vomiting x1 day duration. Patient was admitted in early june for acute cholecystitis SP perc tony as she was not deemed candidate for lap tony. Of note drain has stopped draining for a few days.      Previous colonoscopy(ies):  Previous EGD(s):    No pertinent family history in first degree relatives      Review of system  General:  (-) weight loss, (-) fevers  Eyes:  (-) visual changes  CV:  (-) chest pain  Resp: (-) SOB, (-) wheezing  GI: (-) abdominal pain,  (-) nausea, (-) vomiting, (-) dysphagia, (-) diarrhea, (-) constipation, (-) rectal bleeding, (-) melena, (-) hematemesis.  Neuro: (-) confusion, (-) weakness  Psych:  (-) Hallucinations  Heme:  (-) easy bruisability    Past medical/surgical Hx:  PAST MEDICAL & SURGICAL HISTORY:  Coronary artery disease  High cholesterol  CHF (congestive heart failure)  HTN (hypertension)  Cholecystostomy care  S/P CABG (coronary artery bypass graft)    Home Medications:  alendronate 70 mg oral tablet: 1 tab(s) orally once a week  folic acid 1 mg oral tablet: 1 tab(s) orally once a day  Fosamax 70 mg oral tablet: 1 tab(s) orally once a week  furosemide 40 mg oral tablet: 1 tab(s) orally once a day  Lipitor 40 mg oral tablet: 1 tab(s) orally once a day  metOLazone 2.5 mg oral tablet: 1 tab(s) orally once a day  Toprol-XL 25 mg oral tablet, extended release: 1 tab(s) orally once a day  Vitamin D3:       Allergies: mavis (Rash)  peanuts (Rash)  varicella virus vaccine (Unknown)      Current Medications:       Physical exam:  T(C): 37.3 (07-13-20 @ 15:32), Max: 38.6 (07-13-20 @ 12:34)  HR: 114 (07-13-20 @ 15:32) (110 - 114)  BP: 116/50 (07-13-20 @ 15:32) (116/50 - 193/76)  RR: 20 (07-13-20 @ 15:32) (19 - 20)  SpO2: 98% (07-13-20 @ 15:32) (98% - 100%)  GENERAL: NAD  HEAD:  Atraumatic, Normocephalic  EYES: Sclera:NL  NECK: Supple, no JVD or thyromegaly  CHEST/LUNG: Good bilateral air entry  HEART: normal S1, S2. Regular  ABDOMEN: (-) distended, (-) tender, (-) rebound, (+) BS, (-)HSM  EXTREMITIES: (-) edema  NEUROLOGY: (-) asterixis  SKIN: (-) jaundice    Data:                        11.5   17.41 )-----------( 303      ( 13 Jul 2020 13:20 )             36.3     MCV 89.9 (07-13-20)    RDW 15.5 (07-13-20)    HGB trend:  11.5  07-13-20 @ 13:20      07-13    142  |  100  |  11  ----------------------------<  147<H>  3.7   |  29  |  0.6<L>    Ca    9.7      13 Jul 2020 13:20    TPro  7.2  /  Alb  3.7  /  TBili  2.4<H>  /  DBili  2.0<H>  /  AST  542<H>  /  ALT  324<H>  /  AlkPhos  277<H>  07-13    Liver panel trend:  TBili 2.4   /      /      /   AlkP 277   /   Tptn 7.2   /   Alb 3.7    /   DBili 2.0      07-13        PT/INR - ( 13 Jul 2020 13:20 )   PT: 13.00 sec;   INR: 1.13 ratio         PTT - ( 13 Jul 2020 13:20 )  PTT:31.7 sec Gastroenterology/Hepatology Consultation    For questions and inquiries please page [phone].  For urgent matters or after 5pm and on weekends please page the fellow on call through the GI paging system.    87y Female with suspected cholangitis  Patient is a 87y old  Female who presents with a chief complaint of RUQ pain and fever    GI Hx:  88 yo F with PMHx of CHF, CAD s/p CABG, HLD, HTN presenting to the ED for acute onset of moderate/severe, constant, RUQ pain radiating to back associated with NBNB vomiting x1 day duration. Patient was admitted in early june for acute cholecystitis SP perc tony as she was not deemed candidate for lap tony. Of note drain has stopped draining for a few days.  Patient states, her pain is different than the pain she had on her last admission.      No pertinent family history in first degree relatives      Review of system  General:  (-) weight loss, (-) fevers  Eyes:  (-) visual changes  CV:  (-) chest pain  Resp: (-) SOB, (-) wheezing  GI: (+) abdominal pain,  (-) nausea, (+) vomiting, (-) dysphagia, (-) diarrhea, (-) constipation, (-) rectal bleeding, (-) melena, (-) hematemesis.  Neuro: (-) confusion, (-) weakness  Psych:  (-) Hallucinations  Heme:  (-) easy bruisability    Past medical/surgical Hx:  PAST MEDICAL & SURGICAL HISTORY:  Coronary artery disease  High cholesterol  CHF (congestive heart failure)  HTN (hypertension)  Cholecystostomy care  S/P CABG (coronary artery bypass graft)    Home Medications:  alendronate 70 mg oral tablet: 1 tab(s) orally once a week  folic acid 1 mg oral tablet: 1 tab(s) orally once a day  Fosamax 70 mg oral tablet: 1 tab(s) orally once a week  furosemide 40 mg oral tablet: 1 tab(s) orally once a day  Lipitor 40 mg oral tablet: 1 tab(s) orally once a day  metOLazone 2.5 mg oral tablet: 1 tab(s) orally once a day  Toprol-XL 25 mg oral tablet, extended release: 1 tab(s) orally once a day  Vitamin D3:       Allergies: mavis (Rash)  peanuts (Rash)  varicella virus vaccine (Unknown)      Current Medications:       Physical exam:  T(C): 37.3 (07-13-20 @ 15:32), Max: 38.6 (07-13-20 @ 12:34)  HR: 114 (07-13-20 @ 15:32) (110 - 114)  BP: 116/50 (07-13-20 @ 15:32) (116/50 - 193/76)  RR: 20 (07-13-20 @ 15:32) (19 - 20)  SpO2: 98% (07-13-20 @ 15:32) (98% - 100%)  GENERAL: NAD  HEAD:  Atraumatic, Normocephalic  EYES: Sclera:NL  NECK: Supple, no JVD or thyromegaly  CHEST/LUNG: Good bilateral air entry  HEART: normal S1, S2. Regular  ABDOMEN: (-) distended, (+) tender, (-) rebound, (+) BS, (-)HSM  EXTREMITIES: (-) edema  NEUROLOGY: (-) asterixis  SKIN: (-) jaundice    Data:                        11.5   17.41 )-----------( 303      ( 13 Jul 2020 13:20 )             36.3     MCV 89.9 (07-13-20)    RDW 15.5 (07-13-20)    HGB trend:  11.5  07-13-20 @ 13:20      07-13    142  |  100  |  11  ----------------------------<  147<H>  3.7   |  29  |  0.6<L>    Ca    9.7      13 Jul 2020 13:20    TPro  7.2  /  Alb  3.7  /  TBili  2.4<H>  /  DBili  2.0<H>  /  AST  542<H>  /  ALT  324<H>  /  AlkPhos  277<H>  07-13    Liver panel trend:  TBili 2.4   /      /      /   AlkP 277   /   Tptn 7.2   /   Alb 3.7    /   DBili 2.0      07-13    PT/INR - ( 13 Jul 2020 13:20 )   PT: 13.00 sec;   INR: 1.13 ratio    PTT - ( 13 Jul 2020 13:20 )  PTT:31.7 sec

## 2020-07-13 NOTE — H&P ADULT - ASSESSMENT
87 year old female with PMHx of CAD s/p CABG, osteoporosis, recently diagnosed cholecystitis s/p cholecystectomy (6/5) presenting due to abdominal pain of 1 week duration.    #Sepsis, r/o Cholangitis  RUQ pain, elevated LFTs, cholestatic pattern  WBC 17, Fever 101.5, tachycardic to 114  CT A/P suggest drain in place, decreased in gallbladder inflammation, no CBD dilation  GI input appreciated  MRCP ordered  Depending on results might go for ERCP in AM  NPO except meds (for MRI and procedure)  Cardio Dr. Mancini for risk stratification  Medical clearance  Pain control Morphine, avoid Tylenol  Previous drain cultures grew Enterococcus and E.Coli, was d/c on Vantin, Flagyl and Augmentin  Will cover with Meropenem, de-escalate when more stable  If becomes hemodynamically stable overnight or LFTs worsen, will need emergent ERCP    #CAD s/p CABG  ECG with sinus tachycardia  Continue home Aspirin   Cardio Dr. Mancini for risk stratification    #CHF  Unclear EF  Hold home Lasix and Metolazone  Avoid fluid overload    #Elevated BP  Likely due to pain  Adequate pain control    #DVT PPX: SCDs for now, pending possible procedure  #GI PPX Not indicated  #Diet: NPO except meds for now  #Activity: With assistance  #Dispo: From home, ambulates with cane 87 year old female with PMHx of CAD s/p CABG, osteoporosis, recently diagnosed cholecystitis s/p cholecystectomy (6/5) presenting due to abdominal pain of 1 week duration.    #Sepsis, r/o Cholangitis  RUQ pain, elevated LFTs, cholestatic pattern  WBC 17, Fever 101.5, tachycardic to 114  CT A/P suggest drain in place, decreased in gallbladder inflammation, no CBD dilation  GI input appreciated  MRCP ordered  Depending on results might go for ERCP in AM  NPO except meds (for MRI and procedure)  Cardio Dr. Mancini for risk stratification  Medical clearance  S/p 2L in ED  Will start LR at 75cc/Hr  Pain control Morphine, avoid Tylenol  Motrin for fever PRN  Previous drain cultures grew Enterococcus and E.Coli, was d/c on Vantin, Flagyl and Augmentin  Will cover with Meropenem, de-escalate when more stable  If becomes hemodynamically stable overnight or LFTs worsen, will need emergent ERCP    #CAD s/p CABG  ECG with sinus tachycardia  Continue home Aspirin   Cardio Dr. Mancini for risk stratification    #CHF  Unclear EF  Hold home Lasix and Metolazone  Avoid fluid overload    #Elevated BP  Likely due to pain  Adequate pain control    #DVT PPX: SCDs for now, pending possible procedure  #GI PPX Not indicated  #Diet: NPO except meds for now  #Activity: With assistance  #Dispo: From home, ambulates with cane

## 2020-07-13 NOTE — ED PROVIDER NOTE - ATTENDING CONTRIBUTION TO CARE
Attending Note:   86 yo FA PMH cholecystitis managed with a drain placed by IR who p/w vomiting, abd pain and subjective fever since this morning. Pain is diffuse. On exam: PT in NAD. Cardiac- S1S2. Lungs- CTAB. Abdomen soft NTND. Extremities- No LE edema. Neuro- grossly intact. Plan: Obtain lab work and CT scan for eval of worsening infection vs. obstruction

## 2020-07-13 NOTE — ED ADULT NURSE REASSESSMENT NOTE - NSIMPLEMENTINTERV_GEN_ALL_ED
Implemented All Fall with Harm Risk Interventions:  [place] to call system. Call bell, personal items and telephone within reach. Instruct patient to call for assistance. Room bathroom lighting operational. Non-slip footwear when patient is off stretcher. Physically safe environment: no spills, clutter or unnecessary equipment. Stretcher in lowest position, wheels locked, appropriate side rails in place. Provide visual cue, wrist band, yellow gown, etc. Monitor gait and stability. Monitor for mental status changes and reorient to person, place, and time. Review medications for side effects contributing to fall risk. Reinforce activity limits and safety measures with patient and family. Provide visual clues: red socks.

## 2020-07-13 NOTE — H&P ADULT - NSHPPHYSICALEXAM_GEN_ALL_CORE
PHYSICAL EXAM:T(C): 37.3 (07-13-20 @ 15:32), Max: 38.6 (07-13-20 @ 12:34)  HR: 114 (07-13-20 @ 15:32) (110 - 114)  BP: 116/50 (07-13-20 @ 15:32) (116/50 - 193/76)  RR: 20 (07-13-20 @ 15:32) (19 - 20)  SpO2: 98% (07-13-20 @ 15:32) (98% - 100%)  GENERAL: Lethargic, mild jaundice,   HEAD:  Atraumatic, Normocephalic  EYES: EOMI, PERRLA, conjunctiva and sclera clear  NECK: Supple, No JVD  CHEST/LUNG: Clear to auscultation bilaterally; No wheeze  HEART: Regular rate and rhythm; No murmurs, rubs, or gallops  ABDOMEN: Soft, Mild RUQ tenderness on deep palpation, no hepatosplenomegaly   EXTREMITIES:  2+ Peripheral Pulses, No clubbing, cyanosis, or edema  PSYCH: AAOx3  NEUROLOGY: non-focal  SKIN: No rashes or lesions

## 2020-07-13 NOTE — H&P ADULT - NSHPLABSRESULTS_GEN_ALL_CORE
11.5   17.41 )-----------( 303      ( 13 Jul 2020 13:20 )             36.3           07-13    142  |  100  |  11  ----------------------------<  147<H>  3.7   |  29  |  0.6<L>    Ca    9.7      13 Jul 2020 13:20    TPro  7.2  /  Alb  3.7  /  TBili  2.4<H>  /  DBili  2.0<H>  /  AST  542<H>  /  ALT  324<H>  /  AlkPhos  277<H>  07-13        < from: 12 Lead ECG (07.13.20 @ 12:30) >    Ventricular Rate 111 BPM    Atrial Rate 111 BPM    P-R Interval 180 ms    QRS Duration 88 ms    Q-T Interval 348 ms    QTC Calculation(Bezet) 473 ms    P Axis 48 degrees    R Axis 10 degrees    T Axis 19 degrees    Diagnosis Line Sinus tachycardia  Otherwise normal ECG    Confirmed by MATILDA SONG MD ([id number]) on 7/13/2020 1:23:18 PM    < end of copied text >

## 2020-07-14 ENCOUNTER — TRANSCRIPTION ENCOUNTER (OUTPATIENT)
Age: 85
End: 2020-07-14

## 2020-07-14 LAB
ALBUMIN SERPL ELPH-MCNC: 3 G/DL — LOW (ref 3.5–5.2)
ALBUMIN SERPL ELPH-MCNC: 3.2 G/DL — LOW (ref 3.5–5.2)
ALP SERPL-CCNC: 229 U/L — HIGH (ref 30–115)
ALP SERPL-CCNC: 254 U/L — HIGH (ref 30–115)
ALT FLD-CCNC: 232 U/L — HIGH (ref 0–41)
ALT FLD-CCNC: 235 U/L — HIGH (ref 0–41)
ANION GAP SERPL CALC-SCNC: 10 MMOL/L — SIGNIFICANT CHANGE UP (ref 7–14)
ANION GAP SERPL CALC-SCNC: 9 MMOL/L — SIGNIFICANT CHANGE UP (ref 7–14)
AST SERPL-CCNC: 272 U/L — HIGH (ref 0–41)
AST SERPL-CCNC: 296 U/L — HIGH (ref 0–41)
BASOPHILS # BLD AUTO: 0.02 K/UL — SIGNIFICANT CHANGE UP (ref 0–0.2)
BASOPHILS # BLD AUTO: 0.03 K/UL — SIGNIFICANT CHANGE UP (ref 0–0.2)
BASOPHILS NFR BLD AUTO: 0.2 % — SIGNIFICANT CHANGE UP (ref 0–1)
BASOPHILS NFR BLD AUTO: 0.3 % — SIGNIFICANT CHANGE UP (ref 0–1)
BILIRUB DIRECT SERPL-MCNC: 2.6 MG/DL — HIGH (ref 0–0.2)
BILIRUB INDIRECT FLD-MCNC: 0 MG/DL — LOW (ref 0.2–1.2)
BILIRUB SERPL-MCNC: 2.5 MG/DL — HIGH (ref 0.2–1.2)
BILIRUB SERPL-MCNC: 2.6 MG/DL — HIGH (ref 0.2–1.2)
BLD GP AB SCN SERPL QL: SIGNIFICANT CHANGE UP
BUN SERPL-MCNC: 12 MG/DL — SIGNIFICANT CHANGE UP (ref 10–20)
BUN SERPL-MCNC: 12 MG/DL — SIGNIFICANT CHANGE UP (ref 10–20)
CALCIUM SERPL-MCNC: 9 MG/DL — SIGNIFICANT CHANGE UP (ref 8.5–10.1)
CALCIUM SERPL-MCNC: 9.1 MG/DL — SIGNIFICANT CHANGE UP (ref 8.5–10.1)
CHLORIDE SERPL-SCNC: 103 MMOL/L — SIGNIFICANT CHANGE UP (ref 98–110)
CHLORIDE SERPL-SCNC: 106 MMOL/L — SIGNIFICANT CHANGE UP (ref 98–110)
CO2 SERPL-SCNC: 31 MMOL/L — SIGNIFICANT CHANGE UP (ref 17–32)
CO2 SERPL-SCNC: 32 MMOL/L — SIGNIFICANT CHANGE UP (ref 17–32)
CREAT SERPL-MCNC: 0.8 MG/DL — SIGNIFICANT CHANGE UP (ref 0.7–1.5)
CREAT SERPL-MCNC: 0.8 MG/DL — SIGNIFICANT CHANGE UP (ref 0.7–1.5)
EOSINOPHIL # BLD AUTO: 0.02 K/UL — SIGNIFICANT CHANGE UP (ref 0–0.7)
EOSINOPHIL # BLD AUTO: 0.04 K/UL — SIGNIFICANT CHANGE UP (ref 0–0.7)
EOSINOPHIL NFR BLD AUTO: 0.2 % — SIGNIFICANT CHANGE UP (ref 0–8)
EOSINOPHIL NFR BLD AUTO: 0.3 % — SIGNIFICANT CHANGE UP (ref 0–8)
GLUCOSE SERPL-MCNC: 109 MG/DL — HIGH (ref 70–99)
GLUCOSE SERPL-MCNC: 111 MG/DL — HIGH (ref 70–99)
HCT VFR BLD CALC: 31.1 % — LOW (ref 37–47)
HCT VFR BLD CALC: 32.1 % — LOW (ref 37–47)
HGB BLD-MCNC: 9.7 G/DL — LOW (ref 12–16)
HGB BLD-MCNC: 9.8 G/DL — LOW (ref 12–16)
IMM GRANULOCYTES NFR BLD AUTO: 0.2 % — SIGNIFICANT CHANGE UP (ref 0.1–0.3)
IMM GRANULOCYTES NFR BLD AUTO: 0.4 % — HIGH (ref 0.1–0.3)
LACTATE SERPL-SCNC: 1.1 MMOL/L — SIGNIFICANT CHANGE UP (ref 0.7–2)
LACTATE SERPL-SCNC: 1.2 MMOL/L — SIGNIFICANT CHANGE UP (ref 0.7–2)
LIDOCAIN IGE QN: 10 U/L — SIGNIFICANT CHANGE UP (ref 7–60)
LYMPHOCYTES # BLD AUTO: 0.79 K/UL — LOW (ref 1.2–3.4)
LYMPHOCYTES # BLD AUTO: 0.96 K/UL — LOW (ref 1.2–3.4)
LYMPHOCYTES # BLD AUTO: 6.7 % — LOW (ref 20.5–51.1)
LYMPHOCYTES # BLD AUTO: 7.9 % — LOW (ref 20.5–51.1)
MCHC RBC-ENTMCNC: 27.6 PG — SIGNIFICANT CHANGE UP (ref 27–31)
MCHC RBC-ENTMCNC: 28.4 PG — SIGNIFICANT CHANGE UP (ref 27–31)
MCHC RBC-ENTMCNC: 30.5 G/DL — LOW (ref 32–37)
MCHC RBC-ENTMCNC: 31.2 G/DL — LOW (ref 32–37)
MCV RBC AUTO: 90.4 FL — SIGNIFICANT CHANGE UP (ref 81–99)
MCV RBC AUTO: 90.9 FL — SIGNIFICANT CHANGE UP (ref 81–99)
MONOCYTES # BLD AUTO: 1.04 K/UL — HIGH (ref 0.1–0.6)
MONOCYTES # BLD AUTO: 1.12 K/UL — HIGH (ref 0.1–0.6)
MONOCYTES NFR BLD AUTO: 8.8 % — SIGNIFICANT CHANGE UP (ref 1.7–9.3)
MONOCYTES NFR BLD AUTO: 9.3 % — SIGNIFICANT CHANGE UP (ref 1.7–9.3)
NEUTROPHILS # BLD AUTO: 9.85 K/UL — HIGH (ref 1.4–6.5)
NEUTROPHILS # BLD AUTO: 9.95 K/UL — HIGH (ref 1.4–6.5)
NEUTROPHILS NFR BLD AUTO: 82.2 % — HIGH (ref 42.2–75.2)
NEUTROPHILS NFR BLD AUTO: 83.5 % — HIGH (ref 42.2–75.2)
NRBC # BLD: 0 /100 WBCS — SIGNIFICANT CHANGE UP (ref 0–0)
NRBC # BLD: 0 /100 WBCS — SIGNIFICANT CHANGE UP (ref 0–0)
PLATELET # BLD AUTO: 250 K/UL — SIGNIFICANT CHANGE UP (ref 130–400)
PLATELET # BLD AUTO: 271 K/UL — SIGNIFICANT CHANGE UP (ref 130–400)
POTASSIUM SERPL-MCNC: 4.3 MMOL/L — SIGNIFICANT CHANGE UP (ref 3.5–5)
POTASSIUM SERPL-MCNC: 4.5 MMOL/L — SIGNIFICANT CHANGE UP (ref 3.5–5)
POTASSIUM SERPL-SCNC: 4.3 MMOL/L — SIGNIFICANT CHANGE UP (ref 3.5–5)
POTASSIUM SERPL-SCNC: 4.5 MMOL/L — SIGNIFICANT CHANGE UP (ref 3.5–5)
PROT SERPL-MCNC: 5.8 G/DL — LOW (ref 6–8)
PROT SERPL-MCNC: 5.8 G/DL — LOW (ref 6–8)
RBC # BLD: 3.42 M/UL — LOW (ref 4.2–5.4)
RBC # BLD: 3.55 M/UL — LOW (ref 4.2–5.4)
RBC # FLD: 15.8 % — HIGH (ref 11.5–14.5)
RBC # FLD: 15.8 % — HIGH (ref 11.5–14.5)
SARS-COV-2 RNA SPEC QL NAA+PROBE: SIGNIFICANT CHANGE UP
SODIUM SERPL-SCNC: 143 MMOL/L — SIGNIFICANT CHANGE UP (ref 135–146)
SODIUM SERPL-SCNC: 148 MMOL/L — HIGH (ref 135–146)
WBC # BLD: 11.8 K/UL — HIGH (ref 4.8–10.8)
WBC # BLD: 12.1 K/UL — HIGH (ref 4.8–10.8)
WBC # FLD AUTO: 11.8 K/UL — HIGH (ref 4.8–10.8)
WBC # FLD AUTO: 12.1 K/UL — HIGH (ref 4.8–10.8)

## 2020-07-14 PROCEDURE — 99223 1ST HOSP IP/OBS HIGH 75: CPT

## 2020-07-14 PROCEDURE — 99233 SBSQ HOSP IP/OBS HIGH 50: CPT

## 2020-07-14 RX ORDER — FUROSEMIDE 40 MG
40 TABLET ORAL ONCE
Refills: 0 | Status: COMPLETED | OUTPATIENT
Start: 2020-07-14 | End: 2020-07-14

## 2020-07-14 RX ADMIN — Medication 40 MILLIGRAM(S): at 17:29

## 2020-07-14 RX ADMIN — Medication 25 MILLIGRAM(S): at 06:58

## 2020-07-14 RX ADMIN — CHLORHEXIDINE GLUCONATE 1 APPLICATION(S): 213 SOLUTION TOPICAL at 06:58

## 2020-07-14 NOTE — OCCUPATIONAL THERAPY INITIAL EVALUATION ADULT - PERTINENT HX OF CURRENT PROBLEM, REHAB EVAL
87 year old female with PMHx of CAD s/p CABG, CHF, osteoporosis, recently diagnosed cholecystitis s/p cholecystectomy (6/5) presenting due to abdominal pain of 1 week duration. Patient has cholecystectomy bag that has had gradually decreasing output since discharge, stopped producing fluid 1 week ago.

## 2020-07-14 NOTE — GOALS OF CARE CONVERSATION - ADVANCED CARE PLANNING - TREATMENT GUIDELINE COMMENT
Spoke to the HCP Nia (daughter). Patient and family both demands every thing should be done which can help to prolong her life and quality. Pt is FULL Code, No limitation to any speciality care.

## 2020-07-14 NOTE — CONSULT NOTE ADULT - SUBJECTIVE AND OBJECTIVE BOX
[id number]    HPI:   87 year old female with PMHx of CAD s/p CABG, CHF, osteoporosis, recently diagnosed cholecystitis s/p cholecystostomy tube placement (6/5) presenting due to abdominal pain of 1 week duration. Her cholecystostomy tube had deceasing output since she was last discharged, reporting no output for approximately 1 week. Around that time patient started experiencing RUQ abdominal pain, today became severe associated with nausea, loss of appetite and subjective fevers. Denies any cough, chest pain, runny nose, dysuria.  Daughter (former RN), flushed cholecystectomy tube with no return.     PAST MEDICAL & SURGICAL HISTORY:  Coronary artery disease  High cholesterol  CHF (congestive heart failure)  HTN (hypertension)  Cholecystostomy care  S/P CABG (coronary artery bypass graft)      Home Meds: Home Medications:  alendronate 70 mg oral tablet: 1 tab(s) orally once a week (04 Jun 2020 17:27)  folic acid 1 mg oral tablet: 1 tab(s) orally once a day (04 Jun 2020 17:27)  Fosamax 70 mg oral tablet: 1 tab(s) orally once a week (04 Jun 2020 17:27)  furosemide 40 mg oral tablet: 1 tab(s) orally once a day (04 Jun 2020 17:27)  Lipitor 40 mg oral tablet: 1 tab(s) orally once a day (04 Jun 2020 17:27)  metOLazone 2.5 mg oral tablet: 1 tab(s) orally once a day (04 Jun 2020 17:27)  Toprol-XL 25 mg oral tablet, extended release: 1 tab(s) orally once a day (04 Jun 2020 17:27)  Vitamin D3:  (04 Jun 2020 17:27)    Allergies: Allergies    mavis (Rash)  peanuts (Rash)  varicella virus vaccine (Unknown)    Intolerances    ROS:    REVIEW OF SYSTEMS  [ x ] A ten-point review of systems was otherwise negative except as noted.  [ ] Due to altered mental status/intubation, subjective information were not able to be obtained from the patient. History was obtained, to the extent possible, from review of the chart and collateral sources of information.      CURRENT MEDICATIONS:   --------------------------------------------------------------------------------------  Neurologic Medications  ibuprofen  Tablet. 600 milliGRAM(s) Oral every 8 hours PRN Temp greater or equal to 38C (100.4F)  morphine  - Injectable 1 milliGRAM(s) IV Push every 4 hours PRN Moderate Pain (4 - 6)  ondansetron Injectable 4 milliGRAM(s) IV Push every 8 hours PRN Nausea and/or Vomiting    Respiratory Medications    Cardiovascular Medications  metoprolol succinate ER 25 milliGRAM(s) Oral daily    Gastrointestinal Medications    Genitourinary Medications    Hematologic/Oncologic Medications    Antimicrobial/Immunologic Medications  meropenem  IVPB 1000 milliGRAM(s) IV Intermittent every 8 hours    Endocrine/Metabolic Medications    Topical/Other Medications  chlorhexidine 4% Liquid 1 Application(s) Topical <User Schedule>    --------------------------------------------------------------------------------------    VITAL SIGNS, INS/OUTS (last 24 hours):  --------------------------------------------------------------------------------------  ICU Vital Signs Last 24 Hrs  T(C): 37.7 (14 Jul 2020 13:41), Max: 37.7 (14 Jul 2020 13:41)  T(F): 99.8 (14 Jul 2020 13:41), Max: 99.8 (14 Jul 2020 13:41)  HR: 99 (14 Jul 2020 13:41) (83 - 100)  BP: 157/67 (14 Jul 2020 13:41) (123/61 - 157/67)  BP(mean): --  ABP: --  ABP(mean): --  RR: 18 (14 Jul 2020 13:41) (18 - 18)  SpO2: 98% (14 Jul 2020 06:09) (88% - 98%)    I&O's Summary    14 Jul 2020 07:01  -  14 Jul 2020 19:07  --------------------------------------------------------  IN: 0 mL / OUT: 1 mL / NET: -1 mL      --------------------------------------------------------------------------------------  PHYSICAL EXAM  General: NAD, AAOx3, calm and cooperative  HEENT: NCAT, VAHID, no icterus  Cardiac: RRR S1, S2  Respiratory: CTAB, normal respiratory effort  Abdomen: Soft, non-distended, +RUQ tenderness, no guarding or rebound, perc-tony tube appears displaced, not flushable   Neuro:  no focal deficits  Skin: Warm/dry, normal color, no jaundice    --------------------------------------------------------------------------------------  Labs:  CAPILLARY BLOOD GLUCOSE                              9.8    11.80 )-----------( 271      ( 14 Jul 2020 05:49 )             32.1       87y  07-14    148<H>  |  106  |  12  ----------------------------<  111<H>  4.5   |  32  |  0.8      Calcium, Total Serum: 9.1 mg/dL (07-14-20 @ 05:49)      LFTs:             5.8  | 2.6  | 272      ------------------[254     ( 14 Jul 2020 05:49 )  3.2  | 2.6  | 232         Lipase:x      Amylase:x         Female    Coags:     13.00  ----< 1.13    ( 13 Jul 2020 13:20 )     31.7        --------------------------------------------------------------------------------------  RADIOLOGY & ADDITIONAL STUDIES:  < from: US Abdomen Limited (07.13.20 @ 14:48) >  GALLBLADDER/BILIARY TREE: Percutaneous cholecystostomy drain is noted. Cholelithiasis and sludge. Mild wall thickening. No pericholecystic fluid. Negative sonographic Mccloud's sign. No intrahepatic biliary ductal dilatation. The common bile duct measures 9 mm, which is at the upper limit of normal for the patient's age.     PANCREAS:  Visualized head and body are unremarkable. Remainder obscured by overlying bowel gas.    KIDNEY:  Right kidney measures 9.9 cm in length. No hydronephrosis, calculi or solid mass.    AORTA/IVC:  Visualized proximal portions unremarkable.    ASCITES:  None.    IMPRESSION:    Status post percutaneous cholecystostomy drain in place.    Cholelithiasis with gallbladder wall thickening.    < from: CT Abdomen and Pelvis w/ IV Cont (07.13.20 @ 14:37) >      IMPRESSION:     1. Status post transhepatic cholecystostomy. Compared with the previous study of 6/4/2020, the gallbladder is decompressed with a decrease in the pericholecystic inflammatory changes. The pigtail of the catheter is demonstrated within the gallbladder. Calcified stones and gallbladder wall thickening are again noted.    < end of copied text >    < from: MR Abdomen No Cont (07.13.20 @ 21:37) >  IMPRESSION:    1. Severe cholecystitis with transhepatic cholecystostomy tube in place.  2. No choledocholithiasis or definite MR evidence of cholangitis.          < end of copied text >        < end of copied text >  Imaging reviewed warm

## 2020-07-14 NOTE — OCCUPATIONAL THERAPY INITIAL EVALUATION ADULT - PLANNED THERAPY INTERVENTIONS, OT EVAL
IADL retraining/bed mobility training/parent/caregiver training.../strengthening/ADL retraining/ROM/stretching/balance training/transfer training

## 2020-07-14 NOTE — GOALS OF CARE CONVERSATION - ADVANCED CARE PLANNING - CONVERSATION DETAILS
Spoke to the HCP Nia (daughter). Discussed diagnosis, prognosis, today's progress and future plan. Patient and family both demands every thing should be done which can help to prolong her life and quality. Pt is FULL Code.

## 2020-07-14 NOTE — PROGRESS NOTE ADULT - ASSESSMENT
88 yo  F Hx of CAD SP CABG, Mitral regurgitation?, Hx of recent acute cholecystitis SP perc cholecystotomy admitted for sepsis concerns for acute cholangitis.  Currently there is no clear evidence of choledocholithiasis.  Given MR findings it appears that the infection is likely due to cholecytitis however cannot rule concomitant cholangitis.    COVID19- Negative on 7/13/20    Rec:  - IV hydration  - Trend LFTs  - Continue IV Abx (G- + Anaerobe coverage)  - Low fat diet NPO after midnight for planed EUS +/- ERCP  - Recall IR for eval for cholecystotomy  - Cardiology evaluation for risk stratification prior to endoscopy tomorrow  - ERCP on emergent basis if develops suppurative cholangitis

## 2020-07-14 NOTE — PHYSICAL THERAPY INITIAL EVALUATION ADULT - CRITERIA FOR SKILLED THERAPEUTIC INTERVENTIONS
therapy frequency/functional limitations in following categories/impairments found/anticipated equipment needs at discharge/anticipated discharge recommendation/rehab potential

## 2020-07-14 NOTE — PROGRESS NOTE ADULT - ASSESSMENT
87 year old female with PMHx of CAD s/p CABG, osteoporosis, recently diagnosed cholecystitis s/p cholecystectomy (6/5) presenting due to abdominal pain of 1 week duration.    # Sepsis on admission (WBC 17, Fever 101.5, tachycardic to 114) - resolved  # Acute Severe cholecystitis  RUQ pain, elevated LFTs, Mixed hepato-cholestatic pattern   MR Abdomen No Cont (07.13.20 @ 21:37)  1. Severe cholecystitis with transhepatic cholecystostomy tube in place.  2. No choledocholithiasis or definite MR evidence of cholangitis.  CT A/P suggest drain in place, no CBD dilation  GI input appreciated  c/w LR at 75cc/Hr  Pain control Morphine, avoid Tylenol  Motrin for fever PRN  Previous bile drain cultures grew Enterococcus and E.Coli, was d/c on Vantin, Flagyl and Augmentin  Will cover with Meropenem, de-escalate when more stable  Calling surgery again to look at duct   If becomes hemodynamically stable overnight or LFTs worsen, will need to call IR    #CAD s/p CABG  ECG with sinus tachycardia  Continue home Aspirin   Cardio Dr. Mancini for risk stratification    #CHF  Unclear EF  Hold home Lasix and Metolazone  Avoid fluid overload    #Elevated BP  Likely due to pain  Adequate pain control    #DVT PPX: SCDs for now, pending possible procedure  #GI PPX Not indicated  #Diet: NPO except meds for now  #Activity: With assistance  #Dispo: From home, ambulates with cane 87 year old female with PMHx of CAD s/p CABG, osteoporosis, recently diagnosed cholecystitis s/p cholecystectomy (6/5) presenting due to abdominal pain of 1 week duration.    # Sepsis on admission (WBC 17, Fever 101.5, tachycardic to 114) - resolved    # Acute Severe cholecystitis  RUQ pain, elevated LFTs, Mixed hepato-cholestatic pattern   MR Abdomen No Cont (07.13.20 @ 21:37)  1. Severe cholecystitis with transhepatic cholecystostomy tube in place.  2. No choledocholithiasis or definite MR evidence of cholangitis.  c/w LR at 75cc/Hr  Pain control Morphine, avoid Tylenol, Motrin for fever  Abx coverage for Gram - and aneaerobes  Called IR  Called surgery  Pt is scheduled  If becomes hemodynamically stable overnight or LFTs worsen, will need to call IR    #CAD s/p CABG  ECG with sinus tachycardia  Continue home Aspirin   Cardio Dr. Mancini for risk stratification    #CHF  Unclear EF  Hold home Lasix and Metolazone  Avoid fluid overload    #Elevated BP  Likely due to pain  Adequate pain control    #DVT PPX: SCDs for now, pending possible procedure  #GI PPX Not indicated  #Diet: NPO except meds for now  #Activity: With assistance  #Dispo: From home, ambulates with cane 87 year old female with PMHx of CAD s/p CABG, osteoporosis, recently diagnosed cholecystitis s/p cholecystectomy (6/5) presenting due to abdominal pain of 1 week duration.    # Sepsis on admission (WBC 17, Fever 101.5, tachycardic to 114) - resolved    # Acute Severe cholecystitis  RUQ pain, elevated LFTs, Mixed hepato-cholestatic pattern   MR Abdomen No Cont (07.13.20 @ 21:37)  1. Severe cholecystitis with transhepatic cholecystostomy tube in place.  2. No choledocholithiasis or definite MR evidence of cholangitis.  c/w LR at 75cc/Hr  Pain control Morphine, avoid Tylenol, Motrin for fever  Abx coverage for Gram - and aneaerobes  Called IR  Called surgery  Pt is scheduled  If becomes hemodynamically stable overnight or LFTs worsen, Call IR and advance GI for emergent procedure    #CAD s/p CABG  ECG with sinus tachycardia  Continue home Aspirin   Cardio Dr. Mancini for risk stratification    #CHF  Unclear EF  Hold home Lasix and Metolazone  Avoid fluid overload    #Elevated BP  Likely due to pain  Adequate pain control    #DVT PPX: SCDs for now, pending possible procedure  #GI PPX Not indicated  #Diet: NPO @ [place]  #Activity: With assistance  #FULL CODE  #Dispo: From home, ambulates with cane 87 year old female with PMHx of CAD s/p CABG, osteoporosis, recently diagnosed cholecystitis s/p cholecystotomy (6/5) presenting due to abdominal pain of 1 week duration.    # Sepsis on admission (WBC 17, Fever 101.5, tachycardic to 114) - resolved    # Acute Severe cholecystitis  RUQ pain, elevated LFTs, Mixed hepato-cholestatic pattern   MR Abdomen No Cont (07.13.20 @ 21:37)  1. Severe cholecystitis with transhepatic cholecystostomy tube in place.  2. No choledocholithiasis or definite MR evidence of cholangitis.  c/w LR at 75cc/Hr  Pain control Morphine, avoid Tylenol, Motrin for fever  Abx coverage for Gram - and aneaerobes  Called IR  Called surgery  Pt is scheduled for ERCP tomorrow, needs cardiac and medical clearance  If becomes hemodynamically stable overnight or LFTs worsen, Call IR and advance GI for emergent procedure    #CAD s/p CABG  ECG with sinus tachycardia  Continue home Aspirin   Cardio Dr. Mancini for risk stratification    #CHF  Unclear EF  Hold home Lasix and Metolazone  Avoid fluid overload    #Elevated BP  Likely due to pain  Adequate pain control    #DVT PPX: SCDs for now, pending possible procedure  #GI PPX Not indicated  #Diet: NPO @ [place]  #Activity: With assistance  #FULL CODE  #Dispo: From home, ambulates with cane

## 2020-07-14 NOTE — CONSULT NOTE ADULT - ASSESSMENT
* SUMMARY:  88 yo female with hx of CAD s/p CABG, severe MS, presenting with cholangitis needs ERCP    * Patient-based characteristics (Functional capacity)  Patient is able to achieve more than 4 MET (walk 4 blocks, climb 2 flights of stairs, etc...)          Y [] / N [X] (Limited by her severe MS)    High-risk patient:   Recent (<30 days) or active MI          Y [] / N [X]                                    Unstable or severe angina          Y [] / N [X]                                    Decompensated heart failure, or worsening or new-onset heart failure          Y [] / N [X]                                    Severe valvular disease          Y [X] / N []                                    Significant arrhythmia (Tachy- or Bradyarrhythmia)          Y [] / N [X]    * Surgery/Procedure-based characteristics (Type of surgery)  - Low-risk procedure (outpatient procedure, elective, endoscopy, etc...)          Y [X] / N []  - Elevated or Moderate-risk procedure (Inpatient)          Y [] / N []  - High-risk procedure (urgent/emergent procedure, Intrathoracic, vascular, etc...)          Y [] / N []    * Revised Cardiac Risk Index (RCRI)  1- History of ischemic heart disease          Y [X] / N []  2- History of congestive heart failure          Y [] / N [X]  3- History of stroke/TIA          Y [] / N [X]  4- History of insulin-dependent diabetes          Y [] / N [X]  5- Chronic kidney disease (Cr >2mg/dL)          Y [] / N [X]  6- Undergoing suprainguinal vascular, intraperitoneal, or intrathoracic surgery          Y [] / N [X]    Class II risk (One factor) --> 6% risk    * IMPRESSION & RECOMMENDATION:  Moderate to High -risk patient for a Low (<1%) -risk surgery/procedure  - Patient showing signs of pulmonary congestion (crackles, CXR...) with her severe MS she can go into pulmonary edema quickly.  - Please stop IV fluids, give Lasix 40mg IV now; can give Lasix 20mg IV PRN for signs of fluid overload  - Be cautious during the procedure from fluid overload  - Resume diuretics as home dose after procedure  - Patient should also be on aspirin (CAD, CABG, severe MS), will resume post-ERCP    - This consult serves only as a taurus-operative cardiac risk stratification and evaluation to predict 30-days cardiac complications risk and mortality. The decision to proceed with the surgery/procedure is made by the performing physician and the patient - * SUMMARY:  86 yo female with hx of CAD s/p CABG, severe MS, presenting with cholangitis needs ERCP    * Patient-based characteristics (Functional capacity)  Patient is able to achieve more than 4 MET (walk 4 blocks, climb 2 flights of stairs, etc...)          Y [] / N [X] (Limited by her severe MS)    High-risk patient:   Recent (<30 days) or active MI          Y [] / N [X]                                    Unstable or severe angina          Y [] / N [X]                                    Decompensated heart failure, or worsening or new-onset heart failure          Y [] / N [X]                                    Severe valvular disease          Y [X] / N []                                    Significant arrhythmia (Tachy- or Bradyarrhythmia)          Y [] / N [X]    * Surgery/Procedure-based characteristics (Type of surgery)  - Low-risk procedure (outpatient procedure, elective, endoscopy, etc...)          Y [X] / N []  - Elevated or Moderate-risk procedure (Inpatient)          Y [] / N []  - High-risk procedure (urgent/emergent procedure, Intrathoracic, vascular, etc...)          Y [] / N []    * Revised Cardiac Risk Index (RCRI)  1- History of ischemic heart disease          Y [X] / N []  2- History of congestive heart failure          Y [] / N [X]  3- History of stroke/TIA          Y [] / N [X]  4- History of insulin-dependent diabetes          Y [] / N [X]  5- Chronic kidney disease (Cr >2mg/dL)          Y [] / N [X]  6- Undergoing suprainguinal vascular, intraperitoneal, or intrathoracic surgery          Y [] / N [X]    Class II risk (One factor) --> 6% risk    * IMPRESSION & RECOMMENDATION:  Moderate to High -risk patient for a Low (<1%) -risk surgery/procedure  Severe MS - mild fluid overload currently  CAD/CABG - stable disease, no active chest pain    - Patient showing signs of pulmonary congestion (crackles, CXR...) with her severe MS she can go into pulmonary edema quickly.  - Please stop IV fluids, give Lasix 40mg IV now; can give Lasix 20mg IV PRN for signs of fluid overload  - Be cautious during the procedure from fluid overload  - Resume diuretics as home dose after procedure  - Patient should also be on aspirin (CAD, CABG, severe MS), will resume post-ERCP    - This consult serves only as a taurus-operative cardiac risk stratification and evaluation to predict 30-days cardiac complications risk and mortality. The decision to proceed with the surgery/procedure is made by the performing physician and the patient - * SUMMARY:  88 yo female with hx of CAD s/p CABG, severe MS, presenting with cholangitis needs ERCP    * Patient-based characteristics (Functional capacity)  Patient is able to achieve more than 4 MET (walk 4 blocks, climb 2 flights of stairs, etc...)          Y [] / N [X] (Limited by her severe MS)    High-risk patient:   Recent (<30 days) or active MI          Y [] / N [X]                                    Unstable or severe angina          Y [] / N [X]                                    Decompensated heart failure, or worsening or new-onset heart failure          Y [] / N [X]                                    Severe valvular disease          Y [X] / N []                                    Significant arrhythmia (Tachy- or Bradyarrhythmia)          Y [] / N [X]    * Surgery/Procedure-based characteristics (Type of surgery)  - Low-risk procedure (outpatient procedure, elective, endoscopy, etc...)          Y [X] / N []  - Elevated or Moderate-risk procedure (Inpatient)          Y [] / N []  - High-risk procedure (urgent/emergent procedure, Intrathoracic, vascular, etc...)          Y [] / N []    * Revised Cardiac Risk Index (RCRI)  1- History of ischemic heart disease          Y [X] / N []  2- History of congestive heart failure          Y [] / N [X]  3- History of stroke/TIA          Y [] / N [X]  4- History of insulin-dependent diabetes          Y [] / N [X]  5- Chronic kidney disease (Cr >2mg/dL)          Y [] / N [X]  6- Undergoing suprainguinal vascular, intraperitoneal, or intrathoracic surgery          Y [] / N [X]    Class II risk (One factor) --> 6% risk    * IMPRESSION & RECOMMENDATION:  Moderate to High -risk patient for a Low (<1%) -risk surgery/procedure  Severe MS - mild fluid overload currently  CAD/CABG - stable disease, no active chest pain    - Patient showing signs of pulmonary congestion (crackles, CXR...) with her severe MS she can go into pulmonary edema quickly.  - Please stop IV fluids, give Lasix 40mg IV now; can give Lasix 20mg IV PRN for signs of fluid overload  - Be cautious during the procedure from fluid overload  - Resume diuretics as home dose after procedure  - Patient should also be on aspirin (CAD, CABG, severe MS), will resume post-ERCP  - Avoid NSAIDS  - DVT ppx    - This consult serves only as a taurus-operative cardiac risk stratification and evaluation to predict 30-days cardiac complications risk and mortality. The decision to proceed with the surgery/procedure is made by the performing physician and the patient -

## 2020-07-14 NOTE — PROGRESS NOTE ADULT - SUBJECTIVE AND OBJECTIVE BOX
LENGTH OF HOSPITAL STAY: 1d    CHIEF COMPLAINT:   Patient is a 87y old  Female who presents with a chief complaint of Abdominal pain (13 Jul 2020 18:56)      Overnight events:    No acute events overnight    ALLERGIES:  mavis (Rash)  peanuts (Rash)  varicella virus vaccine (Unknown)    MEDICATIONS:  STANDING MEDICATIONS  chlorhexidine 4% Liquid 1 Application(s) Topical <User Schedule>  lactated ringers. 1000 milliLiter(s) IV Continuous <Continuous>  meropenem  IVPB 1000 milliGRAM(s) IV Intermittent every 8 hours  metoprolol succinate ER 25 milliGRAM(s) Oral daily    PRN MEDICATIONS  ibuprofen  Tablet. 600 milliGRAM(s) Oral every 8 hours PRN  morphine  - Injectable 1 milliGRAM(s) IV Push every 4 hours PRN  ondansetron Injectable 4 milliGRAM(s) IV Push every 8 hours PRN    VITALS:   T(F): 99.7  HR: 99  BP: 138/63  RR: 18  SpO2: 98%    LABS:                        9.8    11.80 )-----------( 271      ( 14 Jul 2020 05:49 )             32.1     07-14    148<H>  |  106  |  12  ----------------------------<  111<H>  4.5   |  32  |  0.8    Ca    9.1      14 Jul 2020 05:49    TPro  5.8<L>  /  Alb  3.2<L>  /  TBili  2.6<H>  /  DBili  2.6<H>  /  AST  272<H>  /  ALT  232<H>  /  AlkPhos  254<H>  07-14    PT/INR - ( 13 Jul 2020 13:20 )   PT: 13.00 sec;   INR: 1.13 ratio         PTT - ( 13 Jul 2020 13:20 )  PTT:31.7 sec      Lactate, Blood: 1.2 mmol/L (07-14-20 @ 05:49)  Lactate, Blood: 1.1 mmol/L (07-14-20 @ 00:15)            Cultures:      RADIOLOGY:    PHYSICAL EXAM:  GEN: No acute distress  HEENT: VAHID  LUNGS: Clear to auscultation bilaterally   HEART: S1/S2 present. RRR.   ABD: Soft, non-tender, non-distended. Bowel sounds present, Bile drainage intact  EXT: non edematous, non erythematous  NEURO: AAOX3 LENGTH OF HOSPITAL STAY: 1d    CHIEF COMPLAINT:   Patient is a 87y old  Female who presents with a chief complaint of Abdominal pain (13 Jul 2020 18:56)      Overnight events:    No acute events overnight    ALLERGIES:  mavis (Rash)  peanuts (Rash)  varicella virus vaccine (Unknown)    MEDICATIONS:  STANDING MEDICATIONS  chlorhexidine 4% Liquid 1 Application(s) Topical <User Schedule>  lactated ringers. 1000 milliLiter(s) IV Continuous <Continuous>  meropenem  IVPB 1000 milliGRAM(s) IV Intermittent every 8 hours  metoprolol succinate ER 25 milliGRAM(s) Oral daily    PRN MEDICATIONS  ibuprofen  Tablet. 600 milliGRAM(s) Oral every 8 hours PRN  morphine  - Injectable 1 milliGRAM(s) IV Push every 4 hours PRN  ondansetron Injectable 4 milliGRAM(s) IV Push every 8 hours PRN    VITALS:   T(F): 99.7  HR: 99  BP: 138/63  RR: 18  SpO2: 98%    LABS:                        9.8    11.80 )-----------( 271      ( 14 Jul 2020 05:49 )             32.1     07-14    148<H>  |  106  |  12  ----------------------------<  111<H>  4.5   |  32  |  0.8    Ca    9.1      14 Jul 2020 05:49    TPro  5.8<L>  /  Alb  3.2<L>  /  TBili  2.6<H>  /  DBili  2.6<H>  /  AST  272<H>  /  ALT  232<H>  /  AlkPhos  254<H>  07-14    PT/INR - ( 13 Jul 2020 13:20 )   PT: 13.00 sec;   INR: 1.13 ratio         PTT - ( 13 Jul 2020 13:20 )  PTT:31.7 sec      Lactate, Blood: 1.2 mmol/L (07-14-20 @ 05:49)  Lactate, Blood: 1.1 mmol/L (07-14-20 @ 00:15)            Cultures:      RADIOLOGY:    PHYSICAL EXAM:  GEN: No acute distress  HEENT: VAHID  LUNGS: Clear to auscultation bilaterally   HEART: S1/S2 present. RRR.   ABD: Soft, mildly tender, non-distended. Bowel sounds present, Bile drainage catheter intact  EXT: non edematous, non erythematous  NEURO: AAOX3 LENGTH OF HOSPITAL STAY: 1d    CHIEF COMPLAINT:   Patient is a 87y old  Female who presents with a chief complaint of Abdominal pain (13 Jul 2020 18:56)      Overnight events:    No acute events overnight, lying comfortably in bed and saying Pain improved.     ALLERGIES:  mavis (Rash)  peanuts (Rash)  varicella virus vaccine (Unknown)    MEDICATIONS:  STANDING MEDICATIONS  chlorhexidine 4% Liquid 1 Application(s) Topical <User Schedule>  lactated ringers. 1000 milliLiter(s) IV Continuous <Continuous>  meropenem  IVPB 1000 milliGRAM(s) IV Intermittent every 8 hours  metoprolol succinate ER 25 milliGRAM(s) Oral daily    PRN MEDICATIONS  ibuprofen  Tablet. 600 milliGRAM(s) Oral every 8 hours PRN  morphine  - Injectable 1 milliGRAM(s) IV Push every 4 hours PRN  ondansetron Injectable 4 milliGRAM(s) IV Push every 8 hours PRN    VITALS:   T(F): 99.7  HR: 99  BP: 138/63  RR: 18  SpO2: 98%    LABS:                        9.8    11.80 )-----------( 271      ( 14 Jul 2020 05:49 )             32.1     07-14    148<H>  |  106  |  12  ----------------------------<  111<H>  4.5   |  32  |  0.8    Ca    9.1      14 Jul 2020 05:49    TPro  5.8<L>  /  Alb  3.2<L>  /  TBili  2.6<H>  /  DBili  2.6<H>  /  AST  272<H>  /  ALT  232<H>  /  AlkPhos  254<H>  07-14    PT/INR - ( 13 Jul 2020 13:20 )   PT: 13.00 sec;   INR: 1.13 ratio         PTT - ( 13 Jul 2020 13:20 )  PTT:31.7 sec      Lactate, Blood: 1.2 mmol/L (07-14-20 @ 05:49)  Lactate, Blood: 1.1 mmol/L (07-14-20 @ 00:15)            Cultures:      RADIOLOGY:    PHYSICAL EXAM:  GEN: No acute distress  HEENT: VAHID  LUNGS: Clear to auscultation bilaterally   HEART: S1/S2 present. RRR.   ABD: Soft, mildly tender, non-distended. Bowel sounds present, Bile drainage catheter intact  EXT: non edematous, non erythematous  NEURO: AAOX3

## 2020-07-14 NOTE — CHART NOTE - NSCHARTNOTEFT_GEN_A_CORE
pt was seen and examined at bedside. she states that her pain has improved. denies fevers, chills, cp, sob, n/v/c/d. states that he per tony is not draining   vitals stable  physical exam  Gen:WDWN female in no acute distress  Resp:CTAB  CV:RRR no MRGs appreciated   abd: RUQ drain noted not draining anything slightly tender to palpation diffusely +BS throughout   neuro: A&Ox3 no focal deficits     MRCP  1. Severe cholecystitis with transhepatic cholecystostomy tube in place.  2. No choledocholithiasis or definite MR evidence of cholangitis.    plan: re-call surgery for severe cholecystitis and tube eval  advance diet as tolerated   cont pain control   bowel regimen pt was seen and examined at bedside. she states that her pain has improved. denies fevers, chills, cp, sob, n/v/c/d. states that he per tony is not draining   vitals stable  physical exam  Gen:WDWN female in no acute distress  Resp:CTAB  CV:RRR no MRGs appreciated   abd: RUQ drain noted not draining anything slightly tender to palpation diffusely +BS throughout   neuro: A&Ox3 no focal deficits     MRCP  1. Severe cholecystitis with transhepatic cholecystostomy tube in place.  2. No choledocholithiasis or definite MR evidence of cholangitis.    plan: re-call surgery for severe cholecystitis and tube eval  advance diet as tolerated   cont pain control   bowel regimen  given comorbid conditions and age pt is elevated risk for low risk procedure will need cardiology eval for stratification pt was seen and examined at bedside. she states that her pain has improved. denies fevers, chills, cp, sob, n/v/c/d. states that he per tony is not draining   vitals stable  physical exam  Gen:WDWN female in no acute distress  Resp:CTAB  CV:RRR no MRGs appreciated   abd: RUQ drain noted not draining anything slightly tender to palpation diffusely +BS throughout   neuro: A&Ox3 no focal deficits     MRCP  1. Severe cholecystitis with transhepatic cholecystostomy tube in place.  2. No choledocholithiasis or definite MR evidence of cholangitis.    plan: re-call surgery, IR for severe cholecystitis and tube eval  f/u GI  advance diet as tolerated   cont pain control   bowel regimen  pts daughter made aware of plan  given comorbid conditions and age pt is elevated risk for low risk procedure will need cardiology eval for stratification

## 2020-07-14 NOTE — DISCHARGE NOTE NURSING/CASE MANAGEMENT/SOCIAL WORK - PATIENT PORTAL LINK FT
You can access the FollowMyHealth Patient Portal offered by [facility] by registering at the following website: [email or web address]. By joining [facility]’s FollowMyHealth portal, you will also be able to view your health information using other applications (apps) compatible with our system.

## 2020-07-14 NOTE — OCCUPATIONAL THERAPY INITIAL EVALUATION ADULT - ADDITIONAL COMMENTS
Per CM note and pt's report, Pt. lives alone in a senior citizen apt with elevator access and HHA 6hrs/5 days a week and ambulates with a sc PTA. Pt. reports s/p d/c pt's daughter will live with her for awhile. Per CM note and pt's report, Pt. lives alone in a senior citizen apt with elevator access and HHA 6hrs/5 days a week and ambulates without a device PTA. Pt's daugther purchased a SC for pt, however, pt. does not use. Pt. reports s/p d/c pt's daughter will live with her for awhile.

## 2020-07-14 NOTE — PROGRESS NOTE ADULT - SUBJECTIVE AND OBJECTIVE BOX
Hepatology/GI follow up note: Pt seen and examined at bedside.     For questions and inquiries please page [phone].  For urgent matters or after 5pm and on weekends please page the fellow on call through the GI paging system.    87y Female seen for: Cholecystitis. Concern for choledocholithiasis    Subjective/Interval events:  No pain  SP MRCP: Cholecystitis no evidence of CBD stones no Howie dil  On Iv Abx    Review of system  General:  (-) weight loss, (-) fevers  Eyes:  (-) visual changes  CV:  (-) chest pain  Resp: (-) SOB, (-) wheezing  GI: (-) abdominal pain,  (-) nausea, (-) vomiting, (-) dysphagia, (-) diarrhea, (-) constipation, (-) rectal bleeding, (-) melena, (-) hematemesis.  Neuro: (-) confusion, (-) weakness  Psych:  (-) Hallucinations  Heme:  (-) easy bruisability    Past medical/surgical Hx:  PAST MEDICAL & SURGICAL HISTORY:  Coronary artery disease  High cholesterol  CHF (congestive heart failure)  HTN (hypertension)  Cholecystostomy care  S/P CABG (coronary artery bypass graft)    Home Medications:  Last Order Reconciliation Date: 07-13-20 @ 18:55 (Admission Reconciliation)  alendronate 70 mg oral tablet: 1 tab(s) orally once a week  folic acid 1 mg oral tablet: 1 tab(s) orally once a day  Fosamax 70 mg oral tablet: 1 tab(s) orally once a week  furosemide 40 mg oral tablet: 1 tab(s) orally once a day  Lipitor 40 mg oral tablet: 1 tab(s) orally once a day  metOLazone 2.5 mg oral tablet: 1 tab(s) orally once a day  Toprol-XL 25 mg oral tablet, extended release: 1 tab(s) orally once a day  Vitamin D3:       Allergies:  mavis (Rash)  peanuts (Rash)  varicella virus vaccine (Unknown)      Current Medications:   chlorhexidine 4% Liquid 1 Application(s) Topical <User Schedule>  ibuprofen  Tablet. 600 milliGRAM(s) Oral every 8 hours PRN  lactated ringers. 1000 milliLiter(s) IV Continuous <Continuous>  meropenem  IVPB 1000 milliGRAM(s) IV Intermittent every 8 hours  metoprolol succinate ER 25 milliGRAM(s) Oral daily  morphine  - Injectable 1 milliGRAM(s) IV Push every 4 hours PRN  ondansetron Injectable 4 milliGRAM(s) IV Push every 8 hours PRN        Physical exam:  T(C): 37.6 (07-14-20 @ 06:08), Max: 37.6 (07-14-20 @ 06:08)  HR: 99 (07-14-20 @ 06:08) (83 - 114)  BP: 138/63 (07-14-20 @ 06:08) (116/50 - 151/72)  RR: 18 (07-14-20 @ 06:08) (18 - 20)  SpO2: 98% (07-14-20 @ 06:09) (83% - 98%)    GENERAL: NAD  HEAD:  Atraumatic, Normocephalic  EYES: Sclera:NL  NECK: Supple, no JVD or thyromegaly  CHEST/LUNG: Good bilateral air entry  HEART: normal S1, S2. Regular  ABDOMEN: (-) distended, (-) tender, (-) rebound, (+) BS, (-)HSM  EXTREMITIES: (-) edema  NEUROLOGY: (-) asterixis  SKIN: (-) jaundice      Data:                        9.8    11.80 )-----------( 271      ( 14 Jul 2020 05:49 )             32.1     MCV 90.4 (07-14-20)  90.9 (07-14-20)    RDW 15.8 (07-14-20)  15.8 (07-14-20)    HGB trend:  9.8  07-14-20 @ 05:49  9.7  07-14-20 @ 00:15  11.5  07-13-20 @ 13:20      WBC trend:  11.80  07-14-20 @ 05:49  12.10  07-14-20 @ 00:15  17.41  07-13-20 @ 13:20    07-14    148<H>  |  106  |  12  ----------------------------<  111<H>  4.5   |  32  |  0.8    Ca    9.1      14 Jul 2020 05:49    TPro  5.8<L>  /  Alb  3.2<L>  /  TBili  2.6<H>  /  DBili  2.6<H>  /  AST  272<H>  /  ALT  232<H>  /  AlkPhos  254<H>  07-14    Liver panel trend:  TBili 2.6   /      /      /   AlkP 254   /   Tptn 5.8   /   Alb 3.2    /   DBili 2.6      07-14  TBili 2.5   /      /      /   AlkP 229   /   Tptn 5.8   /   Alb 3.0    /   DBili --      07-14  TBili 2.4   /      /      /   AlkP 277   /   Tptn 7.2   /   Alb 3.7    /   DBili 2.0      07-13        PT/INR - ( 13 Jul 2020 13:20 )   PT: 13.00 sec;   INR: 1.13 ratio         PTT - ( 13 Jul 2020 13:20 )  PTT:31.7 sec      < from: MR Abdomen No Cont (07.13.20 @ 21:37) >  FINDINGS:    HEPATOBILIARY: Transhepatic cholecystostomy catheter placement. Gallbladder wall thickening and extensive pericholecystic inflammation. Cholelithiasis and gallbladder sludge. Mild intrahepatic ductal prominence without significant intrahepatic ductal dilatation. No significant inflammation surrounding the common bile duct. Common bile duct within the upper limits of normal, no choledocholithiasis. Focal edema in the inferior liver adjacent to the catheter (series 9/20-25; series 12/22), likely due to recent instrumentation.    SPLEEN:  Unremarkable.    PANCREAS:  Unremarkable.    ADRENAL GLANDS:  Unremarkable.    KIDNEYS:  Small bilateral renal cysts. No hydronephrosis.    ABDOMINAL NODES:  No adenopathy.    BONES/SOFT TISSUES:  Degenerative change in the spine. Post median sternotomy.    OTHER:  Post mitral valve replacement. Cardiomegaly.    IMPRESSION:    1. Severe cholecystitis with transhepatic cholecystostomy tube in place.  2. No choledocholithiasis or definite MR evidence of cholangitis.    < end of copied text >      COVID-19 PCR: NotDetec: Methodology:  The Abbott Real Time SARS-CoV-2 assay is a real time reverse  transcriptase (RT) Polymerase Chain Reaction (PCR), test intended for the  qualitative detection of RNA from the SARS-CoV-2 in nasopharyngeal and  oropharyngeal swabs from patients suspected of COVID-19 infection.  The SARS-CoV-2 assay is performed on the Abbott m2000 system.  Reference Range:  Not Detected  This test has been validated by [facility]. This assay is for in Vitro diagnostic testing under FDA  Emergency Use Authorization only.  This Test Was Performed At [facility],  [place], [place] [address] (07.13.20 @ 16:20)    Lipase, Serum (07.14.20 @ 00:15)    Lipase, Serum: 10 U/L

## 2020-07-14 NOTE — CONSULT NOTE ADULT - SUBJECTIVE AND OBJECTIVE BOX
HPI:  87 year old female with PMHx of CAD s/p CABG (vein grafts to RCA and LCx), Mitral valve repair complicated by development of severe mitral valve stenosis, osteoporosis, recently diagnosed cholecystitis s/p cholecystectomy (6/5) presenting due to abdominal pain of 1 week duration. Patient has cholecystectomy bag that has had gradually decreasing output since discharge, stopped producing fluid 1 week ago. Around that time patient started experiencing RUQ abdominal pain, today became severe associated with nausea, loss of appetite and subjective fevers. Denies any cough, chest pain, runny nose, dysuria.  Patient is currently admitted for cholangitis, possible ERCP tomorrow. Cardiology required for re-op evaluation.  Patient has no chest pain, no palpitations. She has severe MS causing her dyspnea on exertion. Currently asymptomatic.  She is able to walk 2-3 blocks before getting short of breath.    PAST MEDICAL & SURGICAL HISTORY  Coronary artery disease  High cholesterol  CHF (congestive heart failure)  HTN (hypertension)  Cholecystostomy care  S/P CABG (coronary artery bypass graft)      SOCIAL HISTORY:  []smoker  []Alcohol  []Drug    ALLERGIES:  mavis (Rash)  peanuts (Rash)  varicella virus vaccine (Unknown)    MEDICATIONS:  MEDICATIONS  (STANDING):  chlorhexidine 4% Liquid 1 Application(s) Topical <User Schedule>  lactated ringers. 1000 milliLiter(s) (75 mL/Hr) IV Continuous <Continuous>  meropenem  IVPB 1000 milliGRAM(s) IV Intermittent every 8 hours  metoprolol succinate ER 25 milliGRAM(s) Oral daily    MEDICATIONS  (PRN):  ibuprofen  Tablet. 600 milliGRAM(s) Oral every 8 hours PRN Temp greater or equal to 38C (100.4F)  morphine  - Injectable 1 milliGRAM(s) IV Push every 4 hours PRN Moderate Pain (4 - 6)  ondansetron Injectable 4 milliGRAM(s) IV Push every 8 hours PRN Nausea and/or Vomiting    HOME MEDICATIONS:  Home Medications:  alendronate 70 mg oral tablet: 1 tab(s) orally once a week (04 Jun 2020 17:27)  folic acid 1 mg oral tablet: 1 tab(s) orally once a day (04 Jun 2020 17:27)  Fosamax 70 mg oral tablet: 1 tab(s) orally once a week (04 Jun 2020 17:27)  furosemide 40 mg oral tablet: 1 tab(s) orally once a day (04 Jun 2020 17:27)  Lipitor 40 mg oral tablet: 1 tab(s) orally once a day (04 Jun 2020 17:27)  metOLazone 2.5 mg oral tablet: 1 tab(s) orally once a day (04 Jun 2020 17:27)  Toprol-XL 25 mg oral tablet, extended release: 1 tab(s) orally once a day (04 Jun 2020 17:27)  Vitamin D3:  (04 Jun 2020 17:27)    VITALS:   T(F): 99.8 (07-14 @ 13:41), Max: 101.5 (07-13 @ 12:34)  HR: 99 (07-14 @ 13:41) (83 - 114)  BP: 157/67 (07-14 @ 13:41) (116/50 - 193/76)  BP(mean): --  RR: 18 (07-14 @ 13:41) (18 - 20)  SpO2: 98% (07-14 @ 06:09) (83% - 100%)    I&O's Summary    REVIEW OF SYSTEMS:  CONSTITUTIONAL: No weakness, (+) subjective fevers and chills  EYES: No visual changes  ENT: No vertigo or throat pain   NECK: No pain or stiffness  RESPIRATORY: No cough, wheezing, hemoptysis; No shortness of breath  CARDIOVASCULAR: No chest pain or palpitations  GASTROINTESTINAL: (+) abdominal pain. No nausea, vomiting, or hematemesis; No diarrhea or constipation. No melena or hematochezia.  GENITOURINARY: No dysuria, frequency or hematuria  NEUROLOGICAL: No numbness or weakness  SKIN: No itching, no rashes  MSK: No pain    PHYSICAL EXAM:  NEURO: patient is awake , alert and oriented  GEN: Not in acute distress  NECK: no thyroid enlargement, no JVD  LUNGS: Bilateral crackles  CARDIOVASCULAR: S1/S2 present, RRR , no murmurs or rubs, no carotid bruits,  + PP bilaterally  ABD: Soft, non-tender, non-distended, +BS  EXT: No HYACINTH  SKIN: Intact    LABS:                        9.8    11.80 )-----------( 271      ( 14 Jul 2020 05:49 )             32.1     07-14    148<H>  |  106  |  12  ----------------------------<  111<H>  4.5   |  32  |  0.8    Ca    9.1      14 Jul 2020 05:49    TPro  5.8<L>  /  Alb  3.2<L>  /  TBili  2.6<H>  /  DBili  2.6<H>  /  AST  272<H>  /  ALT  232<H>  /  AlkPhos  254<H>  07-14    PT/INR - ( 13 Jul 2020 13:20 )   PT: 13.00 sec;   INR: 1.13 ratio         PTT - ( 13 Jul 2020 13:20 )  PTT:31.7 sec  Lactate, Blood: 1.2 mmol/L (07-14-20 @ 05:49)  Lactate, Blood: 1.1 mmol/L (07-14-20 @ 00:15)      RADIOLOGY:  -CXR: < from: Xray Chest 1 View AP/PA (07.13.20 @ 14:45) >    Mild increase in the prominence of the interstitial markings may be secondary to pulmonary vascular congestion. Interval development of a small left pleural effusion.    < end of copied text >    -TTE (2018): good LV function, severe MS    -CATHETERIZATION (2018): Patent Vein graft to RCA and LX, LAD with mild to moderate disease    ECG:  sinus tach to 110 HPI:  Cardiology evaluation prior to ERCP  87 year old female with PMHx of CAD s/p CABG (vein grafts to RCA and LCx), Mitral valve repair complicated by development of severe mitral valve stenosis, osteoporosis, recently diagnosed cholecystitis s/p cholecystectomy (6/5) presenting due to abdominal pain of 1 week duration. Patient has cholecystectomy bag that has had gradually decreasing output since discharge, stopped producing fluid 1 week ago. Around that time patient started experiencing RUQ abdominal pain, today became severe associated with nausea, loss of appetite and subjective fevers. Denies any cough, chest pain, runny nose, dysuria.  Patient is currently admitted for cholangitis, possible ERCP tomorrow. Cardiology required for re-op evaluation.  Patient has no chest pain, no palpitations. She has severe MS causing her dyspnea on exertion. Currently asymptomatic.  She is able to walk 2-3 blocks before getting short of breath.    PAST MEDICAL & SURGICAL HISTORY  Coronary artery disease  High cholesterol  CHF (congestive heart failure)  HTN (hypertension)  Cholecystostomy care  S/P CABG (coronary artery bypass graft)      SOCIAL HISTORY:  []smoker  []Alcohol  []Drug    ALLERGIES:  mavis (Rash)  peanuts (Rash)  varicella virus vaccine (Unknown)    MEDICATIONS:  MEDICATIONS  (STANDING):  chlorhexidine 4% Liquid 1 Application(s) Topical <User Schedule>  lactated ringers. 1000 milliLiter(s) (75 mL/Hr) IV Continuous <Continuous>  meropenem  IVPB 1000 milliGRAM(s) IV Intermittent every 8 hours  metoprolol succinate ER 25 milliGRAM(s) Oral daily    MEDICATIONS  (PRN):  ibuprofen  Tablet. 600 milliGRAM(s) Oral every 8 hours PRN Temp greater or equal to 38C (100.4F)  morphine  - Injectable 1 milliGRAM(s) IV Push every 4 hours PRN Moderate Pain (4 - 6)  ondansetron Injectable 4 milliGRAM(s) IV Push every 8 hours PRN Nausea and/or Vomiting    HOME MEDICATIONS:  Home Medications:  alendronate 70 mg oral tablet: 1 tab(s) orally once a week (04 Jun 2020 17:27)  folic acid 1 mg oral tablet: 1 tab(s) orally once a day (04 Jun 2020 17:27)  Fosamax 70 mg oral tablet: 1 tab(s) orally once a week (04 Jun 2020 17:27)  furosemide 40 mg oral tablet: 1 tab(s) orally once a day (04 Jun 2020 17:27)  Lipitor 40 mg oral tablet: 1 tab(s) orally once a day (04 Jun 2020 17:27)  metOLazone 2.5 mg oral tablet: 1 tab(s) orally once a day (04 Jun 2020 17:27)  Toprol-XL 25 mg oral tablet, extended release: 1 tab(s) orally once a day (04 Jun 2020 17:27)  Vitamin D3:  (04 Jun 2020 17:27)    VITALS:   T(F): 99.8 (07-14 @ 13:41), Max: 101.5 (07-13 @ 12:34)  HR: 99 (07-14 @ 13:41) (83 - 114)  BP: 157/67 (07-14 @ 13:41) (116/50 - 193/76)  BP(mean): --  RR: 18 (07-14 @ 13:41) (18 - 20)  SpO2: 98% (07-14 @ 06:09) (83% - 100%)    I&O's Summary    REVIEW OF SYSTEMS:  CONSTITUTIONAL: No weakness, (+) subjective fevers and chills  EYES: No visual changes  ENT: No vertigo or throat pain   NECK: No pain or stiffness  RESPIRATORY: No cough, wheezing, hemoptysis; No shortness of breath  CARDIOVASCULAR: No chest pain or palpitations  GASTROINTESTINAL: (+) abdominal pain. No nausea, vomiting, or hematemesis; No diarrhea or constipation. No melena or hematochezia.  GENITOURINARY: No dysuria, frequency or hematuria  NEUROLOGICAL: No numbness or weakness  SKIN: No itching, no rashes  MSK: No pain    PHYSICAL EXAM:  NEURO: patient is awake , alert and oriented  GEN: Not in acute distress  NECK: no thyroid enlargement, no JVD  LUNGS: Bilateral crackles  CARDIOVASCULAR: S1/S2 present, RRR , no murmurs or rubs, no carotid bruits,  + PP bilaterally  ABD: Soft, non-tender, non-distended, +BS  EXT: No HYACINTH  SKIN: Intact    LABS:                        9.8    11.80 )-----------( 271      ( 14 Jul 2020 05:49 )             32.1     07-14    148<H>  |  106  |  12  ----------------------------<  111<H>  4.5   |  32  |  0.8    Ca    9.1      14 Jul 2020 05:49    TPro  5.8<L>  /  Alb  3.2<L>  /  TBili  2.6<H>  /  DBili  2.6<H>  /  AST  272<H>  /  ALT  232<H>  /  AlkPhos  254<H>  07-14    PT/INR - ( 13 Jul 2020 13:20 )   PT: 13.00 sec;   INR: 1.13 ratio         PTT - ( 13 Jul 2020 13:20 )  PTT:31.7 sec  Lactate, Blood: 1.2 mmol/L (07-14-20 @ 05:49)  Lactate, Blood: 1.1 mmol/L (07-14-20 @ 00:15)      RADIOLOGY:  -CXR: < from: Xray Chest 1 View AP/PA (07.13.20 @ 14:45) >    Mild increase in the prominence of the interstitial markings may be secondary to pulmonary vascular congestion. Interval development of a small left pleural effusion.    < end of copied text >    -TTE (2018): good LV function, severe MS    -CATHETERIZATION (2018): Patent Vein graft to RCA and LX, LAD with mild to moderate disease    ECG:  sinus tach to 110

## 2020-07-14 NOTE — PHYSICAL THERAPY INITIAL EVALUATION ADULT - PERTINENT HX OF CURRENT PROBLEM, REHAB EVAL
87 year old female with PMHx of CAD s/p CABG, CHF, osteoporosis, recently diagnosed cholecystitis s/p cholecystectomy (6/5) presenting due to abdominal pain of 1 week duration.

## 2020-07-14 NOTE — CONSULT NOTE ADULT - ASSESSMENT
ASSESSMENT:      PLAN:   No acute surgical intervention  In light of extensive medical comorbidities, prolonged illness, patient is a poor surgical candidate  IR evaluation of cholecystostomy tube given no output  IV abx  GI for ERCP      Above plan discussed with Dr. Sarkar , patient, and green team

## 2020-07-14 NOTE — OCCUPATIONAL THERAPY INITIAL EVALUATION ADULT - GENERAL OBSERVATIONS, REHAB EVAL
Pt. encountered semifowler in bed in NAD with +IV drip, 2L O2 via NC, cholecystectomy bag, agreeable to OT IE. MAXWELL Siddiqi discontinued IV for session. Pt. left seated on b/s chair with +IV lock, 2L O2 via NC, cholecystectomy bag, and chair alarm on. MAXWELL Siddiqi made aware

## 2020-07-15 ENCOUNTER — TRANSCRIPTION ENCOUNTER (OUTPATIENT)
Age: 85
End: 2020-07-15

## 2020-07-15 LAB
ALBUMIN SERPL ELPH-MCNC: 3.2 G/DL — LOW (ref 3.5–5.2)
ALP SERPL-CCNC: 221 U/L — HIGH (ref 30–115)
ALT FLD-CCNC: 157 U/L — HIGH (ref 0–41)
ANION GAP SERPL CALC-SCNC: 15 MMOL/L — HIGH (ref 7–14)
AST SERPL-CCNC: 100 U/L — HIGH (ref 0–41)
BASOPHILS # BLD AUTO: 0.02 K/UL — SIGNIFICANT CHANGE UP (ref 0–0.2)
BASOPHILS NFR BLD AUTO: 0.2 % — SIGNIFICANT CHANGE UP (ref 0–1)
BILIRUB SERPL-MCNC: 1 MG/DL — SIGNIFICANT CHANGE UP (ref 0.2–1.2)
BUN SERPL-MCNC: 10 MG/DL — SIGNIFICANT CHANGE UP (ref 10–20)
CALCIUM SERPL-MCNC: 9.3 MG/DL — SIGNIFICANT CHANGE UP (ref 8.5–10.1)
CHLORIDE SERPL-SCNC: 101 MMOL/L — SIGNIFICANT CHANGE UP (ref 98–110)
CO2 SERPL-SCNC: 30 MMOL/L — SIGNIFICANT CHANGE UP (ref 17–32)
CREAT SERPL-MCNC: 0.6 MG/DL — LOW (ref 0.7–1.5)
EOSINOPHIL # BLD AUTO: 0.04 K/UL — SIGNIFICANT CHANGE UP (ref 0–0.7)
EOSINOPHIL NFR BLD AUTO: 0.3 % — SIGNIFICANT CHANGE UP (ref 0–8)
GLUCOSE SERPL-MCNC: 132 MG/DL — HIGH (ref 70–99)
HCT VFR BLD CALC: 33.3 % — LOW (ref 37–47)
HGB BLD-MCNC: 9.8 G/DL — LOW (ref 12–16)
IMM GRANULOCYTES NFR BLD AUTO: 0.5 % — HIGH (ref 0.1–0.3)
LYMPHOCYTES # BLD AUTO: 1.44 K/UL — SIGNIFICANT CHANGE UP (ref 1.2–3.4)
LYMPHOCYTES # BLD AUTO: 11 % — LOW (ref 20.5–51.1)
MAGNESIUM SERPL-MCNC: 2 MG/DL — SIGNIFICANT CHANGE UP (ref 1.8–2.4)
MCHC RBC-ENTMCNC: 26.8 PG — LOW (ref 27–31)
MCHC RBC-ENTMCNC: 29.4 G/DL — LOW (ref 32–37)
MCV RBC AUTO: 91.2 FL — SIGNIFICANT CHANGE UP (ref 81–99)
MONOCYTES # BLD AUTO: 1.02 K/UL — HIGH (ref 0.1–0.6)
MONOCYTES NFR BLD AUTO: 7.8 % — SIGNIFICANT CHANGE UP (ref 1.7–9.3)
NEUTROPHILS # BLD AUTO: 10.54 K/UL — HIGH (ref 1.4–6.5)
NEUTROPHILS NFR BLD AUTO: 80.2 % — HIGH (ref 42.2–75.2)
NRBC # BLD: 0 /100 WBCS — SIGNIFICANT CHANGE UP (ref 0–0)
PLATELET # BLD AUTO: 296 K/UL — SIGNIFICANT CHANGE UP (ref 130–400)
POTASSIUM SERPL-MCNC: 3.9 MMOL/L — SIGNIFICANT CHANGE UP (ref 3.5–5)
POTASSIUM SERPL-SCNC: 3.9 MMOL/L — SIGNIFICANT CHANGE UP (ref 3.5–5)
PROT SERPL-MCNC: 6.3 G/DL — SIGNIFICANT CHANGE UP (ref 6–8)
RBC # BLD: 3.65 M/UL — LOW (ref 4.2–5.4)
RBC # FLD: 15.9 % — HIGH (ref 11.5–14.5)
SODIUM SERPL-SCNC: 146 MMOL/L — SIGNIFICANT CHANGE UP (ref 135–146)
WBC # BLD: 13.13 K/UL — HIGH (ref 4.8–10.8)
WBC # FLD AUTO: 13.13 K/UL — HIGH (ref 4.8–10.8)

## 2020-07-15 PROCEDURE — 43274 ERCP DUCT STENT PLACEMENT: CPT

## 2020-07-15 PROCEDURE — 74328 X-RAY BILE DUCT ENDOSCOPY: CPT | Mod: 26,XU

## 2020-07-15 PROCEDURE — 43239 EGD BIOPSY SINGLE/MULTIPLE: CPT | Mod: XU

## 2020-07-15 PROCEDURE — 43262 ENDO CHOLANGIOPANCREATOGRAPH: CPT | Mod: XU

## 2020-07-15 PROCEDURE — 99233 SBSQ HOSP IP/OBS HIGH 50: CPT

## 2020-07-15 PROCEDURE — 43264 ERCP REMOVE DUCT CALCULI: CPT | Mod: XU

## 2020-07-15 RX ORDER — ALBUTEROL 90 UG/1
2.5 AEROSOL, METERED ORAL ONCE
Refills: 0 | Status: DISCONTINUED | OUTPATIENT
Start: 2020-07-15 | End: 2020-07-17

## 2020-07-15 RX ORDER — FUROSEMIDE 40 MG
20 TABLET ORAL ONCE
Refills: 0 | Status: COMPLETED | OUTPATIENT
Start: 2020-07-15 | End: 2020-07-15

## 2020-07-15 RX ORDER — INDOMETHACIN 50 MG
100 CAPSULE ORAL ONCE
Refills: 0 | Status: COMPLETED | OUTPATIENT
Start: 2020-07-15 | End: 2020-07-15

## 2020-07-15 RX ORDER — SODIUM CHLORIDE 9 MG/ML
1000 INJECTION, SOLUTION INTRAVENOUS
Refills: 0 | Status: DISCONTINUED | OUTPATIENT
Start: 2020-07-15 | End: 2020-07-15

## 2020-07-15 RX ADMIN — Medication 100 MILLIGRAM(S): at 14:15

## 2020-07-15 RX ADMIN — Medication 100 MILLIGRAM(S): at 14:14

## 2020-07-15 RX ADMIN — CHLORHEXIDINE GLUCONATE 1 APPLICATION(S): 213 SOLUTION TOPICAL at 05:19

## 2020-07-15 RX ADMIN — Medication 25 MILLIGRAM(S): at 05:19

## 2020-07-15 RX ADMIN — Medication 20 MILLIGRAM(S): at 15:14

## 2020-07-15 NOTE — PROGRESS NOTE ADULT - SUBJECTIVE AND OBJECTIVE BOX
Patient Information:  CHINA BAÑUELOS / 87y / Female / MRN#:[id number]    Hospital Day: 2d    Interval History:  Patient seen and examined at bedside. Pt had several low grade temps this morning, 100.1, 100.5. Pt was scheduled for ERCP today, was delayed until IR could assess pt. IR adjusted cholecystostomy tube and is now draining better.     Past Medical History:  Coronary artery disease  High cholesterol  CHF (congestive heart failure)  HTN (hypertension)  No pertinent past medical history    Past Surgical History:  Cholecystostomy care  S/P CABG (coronary artery bypass graft)  No significant past surgical history    Allergies:  mavis (Rash)  peanuts (Rash)  varicella virus vaccine (Unknown)    Medications:  PRN:  ibuprofen  Tablet. 600 milliGRAM(s) Oral every 8 hours PRN Temp greater or equal to 38C (100.4F)  morphine  - Injectable 1 milliGRAM(s) IV Push every 4 hours PRN Moderate Pain (4 - 6)  ondansetron Injectable 4 milliGRAM(s) IV Push every 8 hours PRN Nausea and/or Vomiting    Standing:  chlorhexidine 4% Liquid 1 Application(s) Topical <User Schedule>  meropenem  IVPB 1000 milliGRAM(s) IV Intermittent every 8 hours  metoprolol succinate ER 25 milliGRAM(s) Oral daily    Home:  alendronate 70 mg oral tablet: 1 tab(s) orally once a week  folic acid 1 mg oral tablet: 1 tab(s) orally once a day  Fosamax 70 mg oral tablet: 1 tab(s) orally once a week  furosemide 40 mg oral tablet: 1 tab(s) orally once a day  Lipitor 40 mg oral tablet: 1 tab(s) orally once a day  metOLazone 2.5 mg oral tablet: 1 tab(s) orally once a day  Toprol-XL 25 mg oral tablet, extended release: 1 tab(s) orally once a day  Vitamin D3:     Vitals:  T(C): 37.8, Max: 38.1 (07-15-20 @ 05:52)  T(F): 100.1, Max: 100.5 (07-15-20 @ 05:52)  HR: 100 (99 - 100)  BP: 142/66 (124/59 - 157/67)  RR: 18 (18 - 19)  SpO2: 96% (96% - 96%)    Physical Exam:  General: Awake, alert, NAD, resting comfortably in bed, on O2 NC 2L  HEENT: Head NC/AT  Heart: RRR; S1/S2; systolic murmur  Lungs: mild rales bilateral  Abdomen: Soft, nondistended, RUQ tenderness near cholecystostomy entry site, appears clean and intact, BS+  Extremities: No edema, clubbing, cyanosis in upper or lower extremities  Neuro: AAOx3, NFD.    Labs:  CBC (07-15 @ 05:40)                        Hgb: 9.8    WBC: 13.13 )-----------------( Plts: 296                              Hct: 33.3     Chem (07-15 @ 05:40)  Na: 146  |     Cl: 101     |  BUN: 10  -----------------------------------------< Gluc: 132    K: 3.9   |    HCO3: 30  |  Cr: 0.6    Ca 9.3 (07-15 @ 05:40)  Mg 2.0 (07-15 @ 05:40)    LFTs (07-15 @ 05:40)  TPro 6.3  /  Alb 3.2  TBili 1.0  /  DBili       /    /  AlkPhos 221    Cardiac Markers (07-14 @ 05:49)  Troponin I X  Troponin T X  CK X  CKMB X  CKMB Units X  Myoglobin X  Lactate 1.2  ESR X    Cardiac Markers (07-14 @ 00:15)  Troponin I X  Troponin T X  CK X  CKMB X  CKMB Units X  Myoglobin X  Lactate 1.1  ESR X    Cardiac Markers (07-13 @ 13:20)  Troponin I X  Troponin T X  CK X  CKMB X  CKMB Units X  Myoglobin X  Lactate 1.3  ESR X        PT/INR (07-13 @ 13:20)  PT: 13.00; INR: 1.13   PTT: 31.7             Microbiology:  Culture - Blood (collected 07-13 @ 13:20)  Source: .Blood Blood-Peripheral  Preliminary Report (07-15 @ 00:04):    No growth to date.    Culture - Blood (collected 07-13 @ 13:20)  Source: .Blood Blood-Peripheral  Preliminary Report (07-15 @ 00:04):    No growth to date. Patient Information:  CHINA BAÑUELOS / 87y / Female / MRN#:[id number]    Hospital Day: 2d    Interval History:  Patient seen and examined at bedside. Pt had several low grade temps this morning, 100.1, 100.5. Pt was scheduled for ERCP today, was delayed until IR could assess pt. IR adjusted cholecystostomy tube and is now draining better.     Attending Note: Pt seen and examined at bedside. No cp or sob.     Past Medical History:  Coronary artery disease  High cholesterol  CHF (congestive heart failure)  HTN (hypertension)  No pertinent past medical history    Past Surgical History:  Cholecystostomy care  S/P CABG (coronary artery bypass graft)  No significant past surgical history    Allergies:  mavis (Rash)  peanuts (Rash)  varicella virus vaccine (Unknown)    Medications:  PRN:  ibuprofen  Tablet. 600 milliGRAM(s) Oral every 8 hours PRN Temp greater or equal to 38C (100.4F)  morphine  - Injectable 1 milliGRAM(s) IV Push every 4 hours PRN Moderate Pain (4 - 6)  ondansetron Injectable 4 milliGRAM(s) IV Push every 8 hours PRN Nausea and/or Vomiting    Standing:  chlorhexidine 4% Liquid 1 Application(s) Topical <User Schedule>  meropenem  IVPB 1000 milliGRAM(s) IV Intermittent every 8 hours  metoprolol succinate ER 25 milliGRAM(s) Oral daily    Home:  alendronate 70 mg oral tablet: 1 tab(s) orally once a week  folic acid 1 mg oral tablet: 1 tab(s) orally once a day  Fosamax 70 mg oral tablet: 1 tab(s) orally once a week  furosemide 40 mg oral tablet: 1 tab(s) orally once a day  Lipitor 40 mg oral tablet: 1 tab(s) orally once a day  metOLazone 2.5 mg oral tablet: 1 tab(s) orally once a day  Toprol-XL 25 mg oral tablet, extended release: 1 tab(s) orally once a day  Vitamin D3:     Vitals:  T(C): 37.8, Max: 38.1 (07-15-20 @ 05:52)  T(F): 100.1, Max: 100.5 (07-15-20 @ 05:52)  HR: 100 (99 - 100)  BP: 142/66 (124/59 - 157/67)  RR: 18 (18 - 19)  SpO2: 96% (96% - 96%)    Physical Exam:  General: Awake, alert, NAD, resting comfortably in bed, on O2 NC 2L  HEENT: Head NC/AT  Heart: RRR; S1/S2; systolic murmur  Lungs: mild rales bilateral  Abdomen: Soft, nondistended, RUQ tenderness near cholecystostomy entry site, appears clean and intact, BS+  Extremities: No edema, clubbing, cyanosis in upper or lower extremities  Neuro: AAOx3, NFD.    Labs:  CBC (07-15 @ 05:40)                        Hgb: 9.8    WBC: 13.13 )-----------------( Plts: 296                              Hct: 33.3     Chem (07-15 @ 05:40)  Na: 146  |     Cl: 101     |  BUN: 10  -----------------------------------------< Gluc: 132    K: 3.9   |    HCO3: 30  |  Cr: 0.6    Ca 9.3 (07-15 @ 05:40)  Mg 2.0 (07-15 @ 05:40)    LFTs (07-15 @ 05:40)  TPro 6.3  /  Alb 3.2  TBili 1.0  /  DBili       /    /  AlkPhos 221    Cardiac Markers (07-14 @ 05:49)  Troponin I X  Troponin T X  CK X  CKMB X  CKMB Units X  Myoglobin X  Lactate 1.2  ESR X    Cardiac Markers (07-14 @ 00:15)  Troponin I X  Troponin T X  CK X  CKMB X  CKMB Units X  Myoglobin X  Lactate 1.1  ESR X    Cardiac Markers (07-13 @ 13:20)  Troponin I X  Troponin T X  CK X  CKMB X  CKMB Units X  Myoglobin X  Lactate 1.3  ESR X        PT/INR (07-13 @ 13:20)  PT: 13.00; INR: 1.13   PTT: 31.7             Microbiology:  Culture - Blood (collected 07-13 @ 13:20)  Source: .Blood Blood-Peripheral  Preliminary Report (07-15 @ 00:04):    No growth to date.    Culture - Blood (collected 07-13 @ 13:20)  Source: .Blood Blood-Peripheral  Preliminary Report (07-15 @ 00:04):    No growth to date.

## 2020-07-15 NOTE — CONSULT NOTE ADULT - SUBJECTIVE AND OBJECTIVE BOX
INTERVENTIONAL RADIOLOGY CONSULT:     HPI:  87 year old female with PMHx of CAD s/p CABG, CHF, osteoporosis, recently diagnosed cholecystitis s/p cholecystectomy (6/5) presenting due to abdominal pain of 1 week duration. Patient has cholecystectomy bag that has had gradually decreasing output since discharge, stopped producing fluid 1 week ago. Around that time patient started experiencing RUQ abdominal pain, today became severe associated with nausea, loss of appetite and subjective fevers. Denies any cough, chest pain, runny nose, dysuria.   Daughter (former RN), flushed cholecystectomy tube with no return. Was also flushed by ED resident, no output. (13 Jul 2020 18:56)      PAST MEDICAL & SURGICAL HISTORY:  Coronary artery disease  High cholesterol  CHF (congestive heart failure)  HTN (hypertension)  Cholecystostomy care  S/P CABG (coronary artery bypass graft)      MEDICATIONS  (STANDING):  chlorhexidine 4% Liquid 1 Application(s) Topical <User Schedule>  meropenem  IVPB 1000 milliGRAM(s) IV Intermittent every 8 hours  metoprolol succinate ER 25 milliGRAM(s) Oral daily    MEDICATIONS  (PRN):  ibuprofen  Tablet. 600 milliGRAM(s) Oral every 8 hours PRN Temp greater or equal to 38C (100.4F)  morphine  - Injectable 1 milliGRAM(s) IV Push every 4 hours PRN Moderate Pain (4 - 6)  ondansetron Injectable 4 milliGRAM(s) IV Push every 8 hours PRN Nausea and/or Vomiting      Allergies    mavis (Rash)  peanuts (Rash)  varicella virus vaccine (Unknown)    Intolerances    FAMILY HISTORY:      Physical Exam:   Vital Signs Last 24 Hrs  T(C): 37.8 (15 Jul 2020 06:34), Max: 38.1 (15 Jul 2020 05:52)  T(F): 100.1 (15 Jul 2020 06:34), Max: 100.5 (15 Jul 2020 05:52)  HR: 100 (15 Jul 2020 05:52) (99 - 100)  BP: 142/66 (15 Jul 2020 05:52) (124/59 - 157/67)  BP(mean): --  RR: 18 (15 Jul 2020 05:52) (18 - 19)  SpO2: 96% (14 Jul 2020 21:18) (96% - 96%)      Labs:                         9.8    13.13 )-----------( 296      ( 15 Jul 2020 05:40 )             33.3     07-15    146  |  101  |  10  ----------------------------<  132<H>  3.9   |  30  |  0.6<L>    Ca    9.3      15 Jul 2020 05:40  Mg     2.0     07-15    TPro  6.3  /  Alb  3.2<L>  /  TBili  1.0  /  DBili  x   /  AST  100<H>  /  ALT  157<H>  /  AlkPhos  221<H>  07-15    PT/INR - ( 13 Jul 2020 13:20 )   PT: 13.00 sec;   INR: 1.13 ratio         PTT - ( 13 Jul 2020 13:20 )  PTT:31.7 sec    Pertinent labs:                      9.8    13.13 )-----------( 296      ( 15 Jul 2020 05:40 )             33.3       07-15    146  |  101  |  10  ----------------------------<  132<H>  3.9   |  30  |  0.6<L>    Ca    9.3      15 Jul 2020 05:40  Mg     2.0     07-15    TPro  6.3  /  Alb  3.2<L>  /  TBili  1.0  /  DBili  x   /  AST  100<H>  /  ALT  157<H>  /  AlkPhos  221<H>  07-15      PT/INR - ( 13 Jul 2020 13:20 )   PT: 13.00 sec;   INR: 1.13 ratio         PTT - ( 13 Jul 2020 13:20 )  PTT:31.7 sec    Radiology & Additional Studies:     Radiology imaging reviewed.       ASSESSMENT AND PLAN:  87F with multiple medical comorbidities post percutaneous cholecystostomy 6/5/2020 admitted for abdominal pain, minimal output per tube. US/CT/MR demonstrating GB wall thickening, cholecystostomy adequately positioned, no choledocholithiasis. Low grade temperature, mildly tachycardic, normotensive, mild leukocytosis. Tube flushed at the bedside by IR attending without obstruction.   - Cholecystostomy adequately positioned, flushing. No intervention at this time.    Thank you for the courtesy of this consult, please call [phone] with any further questions.

## 2020-07-15 NOTE — PROGRESS NOTE ADULT - SUBJECTIVE AND OBJECTIVE BOX
Progress Note: Surgery  Patient: CHINA BAÑUELOS , 87y (06-Dec-1932)Female   MRN: [id number]  Location: [address]  Visit: 07-13-20 Inpatient  Date: 07-15-20 @ 08:49    Procedure/Diagnosis:  Events over 24h: No new complaints. Pt spiked a low grade temperature 100.5 at 5AM, and has been  throughout hospital stay. Today AM, PCT is flushing and draining bilious output. Pt has been NPO after midnight for planned procedure today. Denies nausea, vomiting, and improving RUQ abdominal pain.    Vitals: T(F): 100.1 (07-15-20 @ 06:34), Max: 100.5 (07-15-20 @ 05:52)  HR: 100 (07-15-20 @ 05:52)  BP: 142/66 (07-15-20 @ 05:52) (124/59 - 157/67)  RR: 18 (07-15-20 @ 05:52)  SpO2: 96% (07-14-20 @ 21:18)      Diet: Diet, Soft:   DASH/TLC Sodium & Cholesterol Restricted (07-14-20 @ 14:15)  Diet, NPO after Midnight:      NPO Start Date: 14-Jul-2020,   NPO Start Time: 23:59 (07-14-20 @ 14:15)    IV Fluid:     In:   07-14-20 @ 07:01  -  07-15-20 @ 07:00  --------------------------------------------------------  IN: 0 mL      Out:   07-14-20 @ 07:01  -  07-15-20 @ 07:00  --------------------------------------------------------  OUT:    Voided: 1 mL  Total OUT: 1 mL        Net:   07-14-20 @ 07:01  -  07-15-20 @ 07:00  --------------------------------------------------------  NET: -1 mL        Physical Examination:  General Appearance: NAD, alert and cooperative  Heart: S1 and S2. No murmurs. Rhythm is regular.  Lungs: Clear to auscultation BL without rales, rhonchi, wheezing, crackles or diminished breath sounds.  Abdomen: Soft, nondistended, tender in RUQ, no rebound, rigidity or guarding.     Medications: [Standing]  chlorhexidine 4% Liquid 1 Application(s) Topical <User Schedule>  meropenem  IVPB 1000 milliGRAM(s) IV Intermittent every 8 hours  metoprolol succinate ER 25 milliGRAM(s) Oral daily    Medications:[PRN]  ibuprofen  Tablet. 600 milliGRAM(s) Oral every 8 hours PRN  morphine  - Injectable 1 milliGRAM(s) IV Push every 4 hours PRN  ondansetron Injectable 4 milliGRAM(s) IV Push every 8 hours PRN    Labs:                        9.8    13.13 )-----------( 296      ( 15 Jul 2020 05:40 )             33.3   07-15    146  |  101  |  10  ----------------------------<  132<H>  3.9   |  30  |  0.6<L>    Ca    9.3      15 Jul 2020 05:40  Mg     2.0     07-15    TPro  6.3  /  Alb  3.2<L>  /  TBili  1.0  /  DBili  x   /  AST  100<H>  /  ALT  157<H>  /  AlkPhos  221<H>  07-15  LIVER FUNCTIONS - ( 15 Jul 2020 05:40 )  Alb: 3.2 g/dL / Pro: 6.3 g/dL / ALK PHOS: 221 U/L / ALT: 157 U/L / AST: 100 U/L / GGT: x         CAD s/p CABG, CHF, osteoporosis, recently diagnosed cholecystitis s/p cholecystectomy (6/5)PT/INR - ( 13 Jul 2020 13:20 )   PT: 13.00 sec;   INR: 1.13 ratio       PTT - ( 13 Jul 2020 13:20 )  PTT:31.7 sec    Imaging:    MR Abdomen No Contrast  IMPRESSION:  1. Severe cholecystitis with transhepatic cholecystostomy tube in place.  2. No choledocholithiasis or definite MR evidence of cholangitis.EXAM:  MR ABDOMEN      US Abdomen Limited   IMPRESSION:  Status post percutaneous cholecystostomy drain in place.  Cholelithiasis with gallbladder wall thickening.    CT Abdomen and Pelvis w/ IV Cont  EXAM:  CT ABDOMEN AND PELVIS ICIMPRESSION:   1. Status post transhepatic cholecystostomy. Compared with the previous study of 6/4/2020, the gallbladder is decompressed with a decrease in the pericholecystic inflammatory changes. The pigtail of the catheter is demonstrated within the gallbladder. Calcified stones and gallbladder wall thickening are again noted.          Date/Time: 07-15-20 @ 08:49

## 2020-07-15 NOTE — CHART NOTE - NSCHARTNOTEFT_GEN_A_CORE
PACU ANESTHESIA ADMISSION NOTE      Procedure:   Post op diagnosis:      ____  Intubated  TV:______       Rate: ______      FiO2: ______    _x___  Patent Airway    _x___  Full return of protective reflexes    Requires Albuterol nebs and furosemide 20 mg IVP    Vitals: See record        Mental Status:  _x___ Awake   _____ Alert   _____ Drowsy   _____ Sedated    Nausea/Vomiting:  _x___  NO       ______Yes,   See Post - Op Orders         Pain Scale (0-10):  __0___    Treatment: _x___ None    ____ See Post - Op/PCA Orders    Post - Operative Fluids:   x__ See Post - Op Orders    Plan: Discharge:   x___Floor     _____Critical Care    _____  Other:_________________    Comments:  No anesthesia issues or complications noted.  Discharge when criteria met.

## 2020-07-15 NOTE — PROGRESS NOTE ADULT - ASSESSMENT
Assessment:  87y Female patient HD 2 w/PMH of CAD s/p CABG, CHF, osteoporosis, recently diagnosed cholecystitis s/p cholecystectomy (6/5) presenting with worsening RUQ abd pain and decreasing PCT drain output admitted for cholecystitis which is being managed nonsurgically    Plan:  - GI to take for ERCP today  - IR evaluated the pt and confirmed placement and function of PCT  - trend LFTs  - c/w IV abx (Meropenem)  - No acute surgical intervention at this time: poor surgical candidate given comorbidities  -Pain control as needed  -Hemodynamic monitoring as per routine  -Encourage ambulation and incentive spirometer use (10x/hr when awake)  -GI and DVT prophylaxis  -Check and replete CBC and BMP q daily  -Strict input and output monitoring

## 2020-07-15 NOTE — PRE-ANESTHESIA EVALUATION ADULT - NSANTHOSAYNRD_GEN_A_CORE
No. ARINA screening performed.  STOP BANG Legend: 0-2 = LOW Risk; 3-4 = INTERMEDIATE Risk; 5-8 = HIGH Risk

## 2020-07-15 NOTE — PROGRESS NOTE ADULT - ASSESSMENT
87 year old female with PMHx of CAD s/p CABG, osteoporosis, recently diagnosed cholecystitis s/p cholecystostomy (6/5) presenting due to abdominal pain of 1 week duration.    # Acute Severe cholecystitis  - RUQ pain, elevated LFTs, Mixed hepato-cholestatic pattern; LFTs have been trending down, improving  - MR Abdomen revealed severe cholecystitis, transhepatic cholecystostomy tube in place  - C/w pain control, Meropenem to cover gram neg and anaerobes  - Seen by surgery - no acute surgical intervention, pt poor candidate  - Seen by IR - adjusted cholecystostomy tube, now well positioned and flushing   - GI following - pt was scheduled for ERCP this morning, was delayed pending IR eval, tube is now functioning well; will f/u with GI regarding plan going forwards    #CAD s/p CABG  - ECG with sinus tachycardia  - C/w home ASA after possible procedure  - Seen by Dr. Mancini for risk stratification - is mod-high risk    #Hx of MS  - Severe MS, appears in mild fluid overload  - s/p Lasix 40 mg IV  - Per Cardio, give Lasix 20 mg IV PRN for signs of fluid overload  - Mild rales on exam, does not appear to have LE edema  - Home diuretics to be resumed after potential procedure    #Elevated BP - improved  - Cont to monitor   - Ensure adequate pain control    #DVT PPX: SCDs for now, pending possible procedure  #GI PPX: Not indicated  #Diet: NPO for possible procedure  #Activity: With assistance  #FULL CODE  #Dispo: From home, ambulates with cane 87 year old female with PMHx of CAD s/p CABG, osteoporosis, recently diagnosed cholecystitis s/p cholecystostomy (6/5) presenting due to abdominal pain of 1 week duration.    # Acute Severe cholecystitis  - RUQ pain, elevated LFTs, Mixed hepato-cholestatic pattern; LFTs have been trending down, improving  - MR Abdomen revealed severe cholecystitis, transhepatic cholecystostomy tube in place  - C/w pain control, Meropenem to cover gram neg and anaerobes  - Seen by surgery - no acute surgical intervention, pt poor candidate  - Seen by IR - adjusted cholecystostomy tube, now well positioned and flushing   - GI following - pt was scheduled for ERCP this morning, was delayed pending IR eval, tube is now functioning well; will f/u with GI regarding plan going forwards  -ID consult    #CAD s/p CABG  - ECG with sinus tachycardia  - C/w home ASA after possible procedure  - Seen by Dr. Mancini for risk stratification - is mod-high risk    #Hx of MS  - Severe MS, appears in mild fluid overload  - s/p Lasix 40 mg IV  - Per Cardio, give Lasix 20 mg IV PRN for signs of fluid overload  - Mild rales on exam, does not appear to have LE edema  - Home diuretics to be resumed after potential procedure    #Elevated BP - improved  - Cont to monitor   - Ensure adequate pain control    #DVT PPX: SCDs for now, pending possible procedure  #GI PPX: Not indicated  #Diet: NPO for possible procedure  #Activity: With assistance  #FULL CODE  #Dispo: From home, ambulates with cane

## 2020-07-16 LAB
ALBUMIN SERPL ELPH-MCNC: 3.2 G/DL — LOW (ref 3.5–5.2)
ALP SERPL-CCNC: 191 U/L — HIGH (ref 30–115)
ALT FLD-CCNC: 104 U/L — HIGH (ref 0–41)
ANION GAP SERPL CALC-SCNC: 11 MMOL/L — SIGNIFICANT CHANGE UP (ref 7–14)
AST SERPL-CCNC: 42 U/L — HIGH (ref 0–41)
BASOPHILS # BLD AUTO: 0 K/UL — SIGNIFICANT CHANGE UP (ref 0–0.2)
BASOPHILS NFR BLD AUTO: 0 % — SIGNIFICANT CHANGE UP (ref 0–1)
BILIRUB SERPL-MCNC: 0.7 MG/DL — SIGNIFICANT CHANGE UP (ref 0.2–1.2)
BUN SERPL-MCNC: 15 MG/DL — SIGNIFICANT CHANGE UP (ref 10–20)
CALCIUM SERPL-MCNC: 9.4 MG/DL — SIGNIFICANT CHANGE UP (ref 8.5–10.1)
CHLORIDE SERPL-SCNC: 104 MMOL/L — SIGNIFICANT CHANGE UP (ref 98–110)
CO2 SERPL-SCNC: 33 MMOL/L — HIGH (ref 17–32)
CREAT SERPL-MCNC: 0.5 MG/DL — LOW (ref 0.7–1.5)
EOSINOPHIL # BLD AUTO: 0 K/UL — SIGNIFICANT CHANGE UP (ref 0–0.7)
EOSINOPHIL NFR BLD AUTO: 0 % — SIGNIFICANT CHANGE UP (ref 0–8)
GLUCOSE SERPL-MCNC: 149 MG/DL — HIGH (ref 70–99)
HCT VFR BLD CALC: 32.2 % — LOW (ref 37–47)
HGB BLD-MCNC: 9.8 G/DL — LOW (ref 12–16)
IMM GRANULOCYTES NFR BLD AUTO: 0.5 % — HIGH (ref 0.1–0.3)
LYMPHOCYTES # BLD AUTO: 0.63 K/UL — LOW (ref 1.2–3.4)
LYMPHOCYTES # BLD AUTO: 7.4 % — LOW (ref 20.5–51.1)
MAGNESIUM SERPL-MCNC: 2.2 MG/DL — SIGNIFICANT CHANGE UP (ref 1.8–2.4)
MCHC RBC-ENTMCNC: 28 PG — SIGNIFICANT CHANGE UP (ref 27–31)
MCHC RBC-ENTMCNC: 30.4 G/DL — LOW (ref 32–37)
MCV RBC AUTO: 92 FL — SIGNIFICANT CHANGE UP (ref 81–99)
MONOCYTES # BLD AUTO: 0.45 K/UL — SIGNIFICANT CHANGE UP (ref 0.1–0.6)
MONOCYTES NFR BLD AUTO: 5.3 % — SIGNIFICANT CHANGE UP (ref 1.7–9.3)
NEUTROPHILS # BLD AUTO: 7.39 K/UL — HIGH (ref 1.4–6.5)
NEUTROPHILS NFR BLD AUTO: 86.8 % — HIGH (ref 42.2–75.2)
NRBC # BLD: 0 /100 WBCS — SIGNIFICANT CHANGE UP (ref 0–0)
PLATELET # BLD AUTO: 285 K/UL — SIGNIFICANT CHANGE UP (ref 130–400)
POTASSIUM SERPL-MCNC: 4.6 MMOL/L — SIGNIFICANT CHANGE UP (ref 3.5–5)
POTASSIUM SERPL-SCNC: 4.6 MMOL/L — SIGNIFICANT CHANGE UP (ref 3.5–5)
PROT SERPL-MCNC: 6.1 G/DL — SIGNIFICANT CHANGE UP (ref 6–8)
RBC # BLD: 3.5 M/UL — LOW (ref 4.2–5.4)
RBC # FLD: 15.5 % — HIGH (ref 11.5–14.5)
SODIUM SERPL-SCNC: 148 MMOL/L — HIGH (ref 135–146)
WBC # BLD: 8.51 K/UL — SIGNIFICANT CHANGE UP (ref 4.8–10.8)
WBC # FLD AUTO: 8.51 K/UL — SIGNIFICANT CHANGE UP (ref 4.8–10.8)

## 2020-07-16 PROCEDURE — 99233 SBSQ HOSP IP/OBS HIGH 50: CPT

## 2020-07-16 RX ORDER — CEFTRIAXONE 500 MG/1
1000 INJECTION, POWDER, FOR SOLUTION INTRAMUSCULAR; INTRAVENOUS EVERY 24 HOURS
Refills: 0 | Status: DISCONTINUED | OUTPATIENT
Start: 2020-07-16 | End: 2020-07-17

## 2020-07-16 RX ORDER — IBUPROFEN 200 MG
600 TABLET ORAL THREE TIMES A DAY
Refills: 0 | Status: DISCONTINUED | OUTPATIENT
Start: 2020-07-16 | End: 2020-07-17

## 2020-07-16 RX ORDER — HEPARIN SODIUM 5000 [USP'U]/ML
5000 INJECTION INTRAVENOUS; SUBCUTANEOUS EVERY 12 HOURS
Refills: 0 | Status: DISCONTINUED | OUTPATIENT
Start: 2020-07-16 | End: 2020-07-17

## 2020-07-16 RX ORDER — METRONIDAZOLE 500 MG
500 TABLET ORAL EVERY 8 HOURS
Refills: 0 | Status: DISCONTINUED | OUTPATIENT
Start: 2020-07-16 | End: 2020-07-17

## 2020-07-16 RX ORDER — ASPIRIN/CALCIUM CARB/MAGNESIUM 324 MG
81 TABLET ORAL DAILY
Refills: 0 | Status: DISCONTINUED | OUTPATIENT
Start: 2020-07-16 | End: 2020-07-17

## 2020-07-16 RX ORDER — AMPICILLIN TRIHYDRATE 250 MG
1 CAPSULE ORAL EVERY 6 HOURS
Refills: 0 | Status: DISCONTINUED | OUTPATIENT
Start: 2020-07-16 | End: 2020-07-17

## 2020-07-16 RX ORDER — PANTOPRAZOLE SODIUM 20 MG/1
40 TABLET, DELAYED RELEASE ORAL EVERY 12 HOURS
Refills: 0 | Status: DISCONTINUED | OUTPATIENT
Start: 2020-07-16 | End: 2020-07-17

## 2020-07-16 RX ADMIN — Medication 81 MILLIGRAM(S): at 17:11

## 2020-07-16 RX ADMIN — PANTOPRAZOLE SODIUM 40 MILLIGRAM(S): 20 TABLET, DELAYED RELEASE ORAL at 17:11

## 2020-07-16 RX ADMIN — Medication 100 MILLIGRAM(S): at 21:11

## 2020-07-16 RX ADMIN — CEFTRIAXONE 100 MILLIGRAM(S): 500 INJECTION, POWDER, FOR SOLUTION INTRAMUSCULAR; INTRAVENOUS at 18:53

## 2020-07-16 RX ADMIN — Medication 600 MILLIGRAM(S): at 17:11

## 2020-07-16 RX ADMIN — Medication 108 GRAM(S): at 23:37

## 2020-07-16 RX ADMIN — Medication 25 MILLIGRAM(S): at 06:11

## 2020-07-16 RX ADMIN — Medication 600 MILLIGRAM(S): at 12:32

## 2020-07-16 RX ADMIN — Medication 108 GRAM(S): at 17:18

## 2020-07-16 RX ADMIN — HEPARIN SODIUM 5000 UNIT(S): 5000 INJECTION INTRAVENOUS; SUBCUTANEOUS at 17:12

## 2020-07-16 NOTE — PROGRESS NOTE ADULT - ASSESSMENT
Follow up GI ercp results  Trend labs   Monitor drain outpt  will continue to follow   call surgery team as needed

## 2020-07-16 NOTE — PROGRESS NOTE ADULT - ATTENDING COMMENTS
Agree with the above
Pt with multiple medical problems who previously had cholecystitis, could not get CCY because poor surgical candidate, now s/p percutaneous cholecystostomy tube, now presents with sepsis, elevated LFTs. MRCP showed inflammation of the gallbladder, cholecystostomy was in place. Cholecystostomy tube flushed yesterday with improvement in LFTs but pt spiked fever overnight. Most likely symptoms are from cholecystitis, but given ongoing sepsis with working cholecystostomy tube and elevated LFTs, concern for element of choledocholithiasis, possible cholangitis. Will plan for EUS, possible ERCP if stone is seen.
#Progress Note Handoff  Pending (specify):  GI, ID, follow up ERCP  Family discussion: farida pt and agreed to plan   Disposition: Home___/SNF___/Other___/Unknown at this time_x__  Pt seen during COVID 19 Pandemic.
#Progress Note Handoff  Pending (specify):  advance diet today, monitor for fevers, if good w/in 24 hrs likely DC in am.   Family discussion: Spoke to Nia Beauchamp daughter at 1258 today and updated on plan, wants mom to go home with PT.   Disposition: Home_x__/SNF___/Other___/Unknown at this time___  Pt seen during COVID 19 Pandemic.

## 2020-07-16 NOTE — PROGRESS NOTE ADULT - SUBJECTIVE AND OBJECTIVE BOX
Patient is a 88 y/o  with PMHx of CHF, CAD s/p CABG, HLD, HTN presenting to the ED for acute onset of moderate/severe, constant, RUQ pain radiating to back associated with NBNB vomiting x1 day duration. Patient had Percutaneous tube adjusted and flushed by IR. Patient had ERCP on 7/15 with sphincterotomy, stone extraction, and CBD stent placement. Patient tolerated procedure well, no current issues or noted overnight events.     Review of Systems  General:  Denies Fatigue, Denies Fever, Denies Weakness ,Denies Weight Loss   HEENT: Denies Trouble Swallowing ,Denies  Sore Throat , Denies Change in hearing/vision/speech ,Denies Dizziness    Cardio: Denies  Chest Pain , Palpitations    Respiratory: Denies worsening of SOB, Denies Cough  Abdomen: See detailed HPI  Neuro: Denies Headache Denies Dizziness, Denies Paresthesias  MSK: Denies pain in Bones/Joints/Muscles   Psych: Patient denies depression, denies suicidal or homicidal ideations  Integ: Patient Denies rash, or new skin lesions     Past medical/surgical Hx:  PAST MEDICAL & SURGICAL HISTORY:  Coronary artery disease  High cholesterol  CHF (congestive heart failure)  HTN (hypertension)  Cholecystostomy care  S/P CABG (coronary artery bypass graft)    Home Medications:  alendronate 70 mg oral tablet: 1 tab(s) orally once a week  folic acid 1 mg oral tablet: 1 tab(s) orally once a day  Fosamax 70 mg oral tablet: 1 tab(s) orally once a week  furosemide 40 mg oral tablet: 1 tab(s) orally once a day  Lipitor 40 mg oral tablet: 1 tab(s) orally once a day  metOLazone 2.5 mg oral tablet: 1 tab(s) orally once a day  Toprol-XL 25 mg oral tablet, extended release: 1 tab(s) orally once a day  Vitamin D3:       Allergies: mavis (Rash)  peanuts (Rash)  varicella virus vaccine (Unknown)      Vital Signs  T(F): 96.4 (16 Jul 2020 06:04), Max: 99.6 (15 Jul 2020 11:52)  HR: 86 (16 Jul 2020 06:04) (75 - 101)  BP: 135/64 (16 Jul 2020 06:04) (118/55 - 187/76)  RR: 18 (16 Jul 2020 06:04) (18 - 20)  SpO2: 99% (16 Jul 2020 06:13) (96% - 100%)  Physical Exam  Gen: NAD  HEENT: NC/AT, Mucosal Membranes Moist  Cardio: S1/S2 No S3/S4, Regular  Resp: CTA B/L  Abdomen: Soft, ND/NT, drain noted   Neuro: AAOx3, Cranial Nerve II-XII intact   Extremities: FROM x 4

## 2020-07-16 NOTE — PROGRESS NOTE ADULT - ASSESSMENT
86 yo female with PMH of CAD s/p CABG, osteoporosis, recently diagnosed cholecystitis  s/p cholecystostomy (6/5) presenting with CC of 1 week history of abdominal pain.      # Acute Severe cholecystitis  - RUQ pain, elevated LFTs, LFTs have been trending down, dramatic improvement post ERCP. WBC, T Bili and Alk Phos improving   - MR Abdomen revealed severe cholecystitis, transhepatic cholecystostomy tube in place  - Seen by surgery - no acute surgical intervention, pt poor candidate  - Seen by IR - adjusted cholecystostomy tube, now well positioned and flushing   - s/p ERCP - Per GI, 7/15 sphincterotomy, stone extraction and CBD stent placement. Start PPI 2x daily, stent to be removed in 4-6 weeks with EGD. C/W IV hydration/ PO antibiotics   - C/w pain control Meropenem to cover gram neg and anaerobes  - ID consult - Tentative   	- D/C Meropenem,               - Rocephin IV 2 gm q24hrs and Flagyl  mg q8hrs for a total of 7 days post-ERCP (last dose 7/22/2020)             - On discharge can change to Vantin 200mg PO q12hrs and continue Flagyl      #CAD s/p CABG  - ECG with sinus tachycardia  - C/w home ASA after possible procedure  - Seen by Dr. Mancini for risk stratification - is mod-high risk    #Hx of MS  - Severe MS, appears in mild fluid overload  - s/p Lasix 40 mg IV  - Per Cardio, give Lasix 20 mg IV PRN for signs of fluid overload  - Mild rales on exam, does not appear to have LE edema  - Home diuretics to be resumed after potential procedure    #Elevated BP - improved  - Cont to monitor   - Ensure adequate pain control    #DVT PPX: SCDs for now, pending possible procedure  #GI PPX: PPI  #Diet: Full Liquid  #Activity: With assistance  #FULL CODE  #Dispo: From home, ambulates with cane 86 yo female with PMH of CAD s/p CABG, osteoporosis, recently diagnosed cholecystitis  s/p cholecystostomy (6/5) presenting with CC of 1 week history of abdominal pain.      # Acute Severe cholecystitis  - RUQ pain, elevated LFTs, LFTs have been trending down, dramatic improvement post ERCP. WBC, T Bili and Alk Phos improving   - MR Abdomen revealed severe cholecystitis, transhepatic cholecystostomy tube in place  - Seen by surgery - no acute surgical intervention, pt poor candidate  - Seen by IR - adjusted cholecystostomy tube, now well positioned and flushing   - s/p ERCP - Per GI, 7/15 sphincterotomy, stone extraction and CBD stent placement. Start PPI 2x daily, stent to be removed in 4-6 weeks with EGD. C/W IV hydration/ PO antibiotics   - C/w pain control Meropenem to cover gram neg and anaerobes  - ID consult - Tentative   	- D/C Meropenem,               - Rocephin IV 2 gm q24hrs and Flagyl  mg q8hrs for a total of 7 days post-ERCP (last dose 7/22/2020)             - On discharge can change to Vantin 200mg PO q12hrs and continue Flagyl      #CAD s/p CABG  - ECG with sinus tachycardia  - C/w home ASA after possible procedure  - Seen by Dr. Mancini for risk stratification - is mod-high risk    #Hx of MS  - Severe MS, appears in mild fluid overload  - s/p Lasix 40 mg IV  - Per Cardio, give Lasix 20 mg IV PRN for signs of fluid overload  - Mild rales on exam, does not appear to have LE edema  - Home diuretics to be resumed after potential procedure    #Elevated BP - improved  - Cont to monitor   - Ensure adequate pain control    #hyperna- FWD 0.8-encourgae     #DVT PPX: SCDs for now, pending possible procedure  #GI PPX: PPI  #Diet: Full Liquid  #Activity: With assistance  #FULL CODE  #Dispo: From home, ambulates with cane 88 yo female with PMH of CAD s/p CABG, osteoporosis, recently diagnosed cholecystitis  s/p cholecystostomy (6/5) presenting with CC of 1 week history of abdominal pain.      # Acute Severe cholecystitis  - RUQ pain, elevated LFTs, LFTs have been trending down, dramatic improvement post ERCP. WBC, T Bili and Alk Phos improving   - MR Abdomen revealed severe cholecystitis, transhepatic cholecystostomy tube in place  - Seen by surgery - no acute surgical intervention, pt poor candidate  - Seen by IR - adjusted cholecystostomy tube, now well positioned and flushing   - s/p ERCP - Per GI, 7/15 sphincterotomy, stone extraction and CBD stent placement. Start PPI 2x daily, stent to be removed in 4-6 weeks with EGD.  - C/w PPI BID  - C/w pain control PRN  - F/u ID recs    #CAD s/p CABG  - ECG with sinus tachycardia  - C/w home ASA     #Hx of MS  - Severe MS, appears in mild fluid overload  - s/p Lasix 40 mg IV  - Per Cardio, give Lasix 20 mg IV PRN for signs of fluid overload  - Mild rales on exam, does not appear to have LE edema  - Pt HCO3 elevated, will hold off on home diuretics for now    #Elevated BP - improved  - Cont to monitor   - Ensure adequate pain control    #hyperna- FWD 0.8-encourgae     #DVT PPX: HSQ  #GI PPX: PPI  #Diet: Soft  #Activity: With assistance  #FULL CODE  #Dispo: From home, ambulates with cane; possible d/c tomorrow

## 2020-07-16 NOTE — PROGRESS NOTE ADULT - SUBJECTIVE AND OBJECTIVE BOX
LENGTH OF HOSPITAL STAY: 3d    CHIEF COMPLAINT:   Patient is a 87y old  Female who presents with a chief complaint of Abdominal pain (16 Jul 2020 09:42)      HISTORY OF PRESENTING ILLNESS:    HPI:  87 year old female with PMHx of CAD s/p CABG, CHF, osteoporosis, recently diagnosed cholecystitis s/p cholecystostomy (6/5) presenting due to abdominal pain of 1 week duration. Patient has cholecystectomy bag that has had gradually decreasing output since discharge, stopped producing fluid 1 week ago. Around that time patient started experiencing RUQ abdominal pain, today became severe associated with nausea, loss of appetite and subjective fevers. Denies any cough, chest pain, runny nose, dysuria.     Daughter (former RN), flushed cholecystectomy tube with no return. Was also flushed by ED resident, no output. (13 Jul 2020 18:56)    Hospital day 3, patient seen at bedside. Patient had low grade fevers of on the previous day, today she is afebrile. Not complaining of fevers, but complains of general discomfort, s/p ERCP. Patient is complaining of throat pain from her extubation, diffuse abdominal pain, but was soft and non tender on palpation. Patient denied recent bowel movement, was able to void, and did not have trouble passing gas. Patient is on a clear liquid diet, was drinking water, but did not have an appetite to eat.     PAST MEDICAL & SURGICAL HISTORY  PAST MEDICAL & SURGICAL HISTORY:  Coronary artery disease  High cholesterol  CHF (congestive heart failure)  HTN (hypertension)  Cholecystostomy care  S/P CABG (coronary artery bypass graft)    SOCIAL HISTORY:    ALLERGIES:  mavis (Rash)  peanuts (Rash)  varicella virus vaccine (Unknown)    MEDICATIONS:  STANDING MEDICATIONS  ALBUTerol   0.5% 2.5 milliGRAM(s) Nebulizer once  chlorhexidine 4% Liquid 1 Application(s) Topical <User Schedule>  meropenem  IVPB 1000 milliGRAM(s) IV Intermittent every 8 hours  metoprolol succinate ER 25 milliGRAM(s) Oral daily  pantoprazole    Tablet 40 milliGRAM(s) Oral every 12 hours    PRN MEDICATIONS  ibuprofen  Tablet. 600 milliGRAM(s) Oral every 8 hours PRN  morphine  - Injectable 1 milliGRAM(s) IV Push every 4 hours PRN  ondansetron Injectable 4 milliGRAM(s) IV Push every 8 hours PRN    VITALS:   T(F): 96.4  HR: 86  BP: 135/64  RR: 18  SpO2: 97%    LABS:                        9.8    8.51  )-----------( 285      ( 16 Jul 2020 06:35 )             32.2     07-16    148<H>  |  104  |  15  ----------------------------<  149<H>  4.6   |  33<H>  |  0.5<L>    Ca    9.4      16 Jul 2020 06:35  Mg     2.2     07-16    TPro  6.1  /  Alb  3.2<L>  /  TBili  0.7  /  DBili  x   /  AST  42<H>  /  ALT  104<H>  /  AlkPhos  191<H>  07-16    Culture - Blood (collected 14 Jul 2020 05:49)  Source: .Blood None  Preliminary Report (15 Jul 2020 13:01):    No growth to date.    Culture - Blood (collected 13 Jul 2020 13:20)  Source: .Blood Blood-Peripheral  Preliminary Report (15 Jul 2020 00:04):    No growth to date.    Culture - Blood (collected 13 Jul 2020 13:20)  Source: .Blood Blood-Peripheral  Preliminary Report (15 Jul 2020 00:04):    No growth to date.      RADIOLOGY:    < from: MR Abdomen No Cont (07.13.20 @ 21:37) >    EXAM:  MR ABDOMEN            PROCEDURE DATE:  07/13/2020            INTERPRETATION:    CLINICAL STATEMENT:  Right upper quadrant pain. Cholecystitis status post cholecystostomy. Evaluate for cholangitis.    TECHNIQUE: Axial in- and out-of-phase T1-weighted; axial fat-saturated T2-weighted; axial and coronal single-shot fast spin-echo T2-weighted; 3D high-resolution, heavily T2-weighted images were acquired.    COMPARISON:  CT of earlier the same day.    FINDINGS:    HEPATOBILIARY: Transhepatic cholecystostomy catheter placement. Gallbladder wall thickening and extensive pericholecystic inflammation. Cholelithiasis and gallbladder sludge. Mild intrahepatic ductal prominence without significant intrahepatic ductal dilatation. No significant inflammation surrounding the common bile duct. Common bile duct within the upper limits of normal, no choledocholithiasis. Focal edema in the inferior liver adjacent to the catheter (series 9/20-25; series 12/22), likely due to recent instrumentation.    SPLEEN:  Unremarkable.    PANCREAS:  Unremarkable.    ADRENAL GLANDS:  Unremarkable.    KIDNEYS:  Small bilateral renal cysts. No hydronephrosis.    ABDOMINAL NODES:  No adenopathy.    BONES/SOFT TISSUES:  Degenerative change in the spine. Post median sternotomy.    OTHER:  Post mitral valve replacement. Cardiomegaly.    IMPRESSION:    1. Severe cholecystitis with transhepatic cholecystostomy tube in place.  2. No choledocholithiasis or definite MR evidence of cholangitis.                  KVNG TOSCANO M.D., ATTENDING RADIOLOGIST  This document has been electronically signed. Jul 14 2020  8:24AM    < end of copied text >      < from: US Abdomen Limited (07.13.20 @ 14:48) >    EXAM:  US ABDOMEN LIMITED            PROCEDURE DATE:  07/13/2020            INTERPRETATION:  CLINICAL HISTORY: Right upper quadrant pain, status post biliary drain.    COMPARISON: Abdominal ultrasound dated 6/4/2020. Correlation with same day CT abdomen..    PROCEDURE: Ultrasound of the right upper quadrant was performed.    FINDINGS:    LIVER:  Normal in contour and echogenicity measuring 15.2 cm in length. No focal mass.    GALLBLADDER/BILIARY TREE: Percutaneous cholecystostomy drain is noted. Cholelithiasis and sludge. Mild wall thickening. No pericholecystic fluid. Negative sonographic Mccloud's sign. No intrahepatic biliary ductal dilatation. The common bile duct measures 9 mm, which is at the upper limit of normal for the patient's age.     PANCREAS:  Visualized head and body are unremarkable. Remainder obscured by overlying bowel gas.    KIDNEY:  Right kidney measures 9.9 cm in length. No hydronephrosis, calculi or solid mass.    AORTA/IVC:  Visualized proximal portions unremarkable.    ASCITES:  None.    IMPRESSION:    Status post percutaneous cholecystostomy drain in place.    Cholelithiasis with gallbladder wall thickening.      < end of copied text >      < from: Xray Chest 1 View AP/PA (07.13.20 @ 14:45) >  EXAM:  XR CHEST FRONTAL 1V            PROCEDURE DATE:  07/13/2020            INTERPRETATION:  Clinical History / Reason for exam: Sepsis.    Comparison : Chest radiograph AP portable dated June 4, 2020 time 11:42 PM.    Technique/Positioning: Frontal view time 2:02 PM.    Findings:    Support devices: Sternal wires and surgical clips overlie the mediastinum. There is a prosthetic mitral valve.    Cardiac/mediastinum/hilum: The overall heart size appears moderately enlarged which may be in part related to projection. Dilatation and calcification of the thoracic aorta.    Lung parenchyma/Pleura: Tracheal deviation to the right of midline. Mild prominence of the interstitial markings and fibrotic changes in the left lung base. Small left pleural effusion.    Skeleton/soft tissues: Degenerative changes involving the glenohumeral joints, acromioclavicular joints, and thoracic spine.    Impression:      In comparison with the prior chest x-ray dated June 4, 2020 time 11:42 PM:    Mild increase in the prominence of the interstitial markings may be secondary to pulmonary vascular congestion. Interval development of a small left pleural effusion.                  NATHALIA GOYAL M.D., ATTENDING RADIOLOGIST  This document has been electronically signed. Jul 13 2020  3:06PM    < end of copied text >      < from: CT Abdomen and Pelvis w/ IV Cont (07.13.20 @ 14:37) >  EXAM:  CT ABDOMEN AND PELVIS IC            PROCEDURE DATE:  07/13/2020            INTERPRETATION:  REASON FOR EXAM / CLINICAL STATEMENT: RUQ abdominal pain; S/P cholecystostomy    TECHNIQUE: Contiguous axial CT images were obtained from the lower chest to the pubic symphysis with intravenous contrast.   Reformatted images in the coronal and sagittal planes were acquired.    COMPARISON CT: CT scanning of the abdomen pelvis dated 6/4/2020    OTHER STUDIES USED FOR CORRELATION: None.         FINDINGS    LOWER CHEST: There are atelectatic changes at the lung bases. No pleural or pericardial effusion.    HEPATIC: There is hepatic steatosis with no evidence of mass or bile duct dilatation.      BILIARY: Status post transhepatic cholecystostomy. Compared with the previous study of 6/4/2020, the gallbladder is decompressed with a decrease in the pericholecystic inflammatory changes. The pigtail of the catheter is demonstrated within the gallbladder. Calcified stones and gallbladder wall thickening are again noted. There is no evidence of right upper quadrant fluid collection.    SPLEEN: Unremarkable.        PANCREAS: The pancreas is normal in size and configuration. No evidence of mass or pancreatitis.     ADRENAL GLANDS: Unremarkable.        KIDNEYS: There is symmetric bilateral renal enhancement. No evidence of hydronephrosis, calcified stones, or solid mass.     ABDOMINOPELVIC NODES: Unremarkable.    PELVIC ORGANS: No evidence of pelvic mass, lymphadenopathy. A discrete, stable, 4 cmfluid collection is noted in the right common iliac region which may represent a lymphocele.     PERITONEUM/MESENTERY/BOWEL: A hiatus hernia, duodenal diverticulum, and sigmoid diverticula are again noted. There is no evidence of diverticulitis. Status post ventral hernia repair. No evidence of bowel obstruction, colitis, inflammatory process, or ascites within the abdomen or pelvis. The appendix is normal in appearance.    BONES/SOFT TISSUES: Degenerative changes of the spine are noted.    OTHER: No evidence of abdominal aortic aneurysm.      IMPRESSION:     1. Status post transhepatic cholecystostomy. Compared with the previous study of 6/4/2020, the gallbladder is decompressed with a decrease in the pericholecystic inflammatory changes. The pigtail of the catheter is demonstrated within the gallbladder. Calcified stones and gallbladder wall thickening are again noted.        FLO MITCHELL M.D., ATTENDING RADIOLOGIST  This document has been electronically signed. Jul 13 2020  3:52PM    < end of copied text >      PHYSICAL EXAM:  GEN: No acute distress  HEENT: Normocephalic, Atraumatic   LUNGS: Clear to auscultation bilaterally   HEART: S1/S2 present. RRR.   ABD: Soft, diffuse tenderness, non-distended. Bowel sounds present,  EXT: Bilateral upper and lower extremities, no edema, clubbing or cyanosis    NEURO: AAOX3 LENGTH OF HOSPITAL STAY: 3d    CHIEF COMPLAINT:   Patient is a 87y old  Female who presents with a chief complaint of Abdominal pain (16 Jul 2020 09:42)      HISTORY OF PRESENTING ILLNESS:    HPI:  87 year old female with PMHx of CAD s/p CABG, CHF, osteoporosis, recently diagnosed cholecystitis s/p cholecystostomy (6/5) presenting due to abdominal pain of 1 week duration. Patient has cholecystectomy bag that has had gradually decreasing output since discharge, stopped producing fluid 1 week ago. Around that time patient started experiencing RUQ abdominal pain, today became severe associated with nausea, loss of appetite and subjective fevers. Denies any cough, chest pain, runny nose, dysuria.     Daughter (former RN), flushed cholecystectomy tube with no return. Was also flushed by ED resident, no output. (13 Jul 2020 18:56)    Hospital day 3, patient seen at bedside. Patient had low grade fevers of on the previous day, today she is afebrile. Not complaining of fevers, but complains of general discomfort, s/p ERCP. Patient is complaining of throat pain from her extubation, diffuse abdominal pain, but was soft and non tender on palpation. Patient denied recent bowel movement, was able to void, and did not have trouble passing gas. Patient is on a clear liquid diet, was drinking water, but did not have an appetite to eat.     Attending Note: Pt seen and examined at bedside. No cp or sob. Did say she had full body pain. She says she has arthritis Pt has pain meds but not getting them. [facility] staff.   	    PAST MEDICAL & SURGICAL HISTORY  PAST MEDICAL & SURGICAL HISTORY:  Coronary artery disease  High cholesterol  CHF (congestive heart failure)  HTN (hypertension)  Cholecystostomy care  S/P CABG (coronary artery bypass graft)    SOCIAL HISTORY:    ALLERGIES:  mavis (Rash)  peanuts (Rash)  varicella virus vaccine (Unknown)    MEDICATIONS:  STANDING MEDICATIONS  ALBUTerol   0.5% 2.5 milliGRAM(s) Nebulizer once  chlorhexidine 4% Liquid 1 Application(s) Topical <User Schedule>  meropenem  IVPB 1000 milliGRAM(s) IV Intermittent every 8 hours  metoprolol succinate ER 25 milliGRAM(s) Oral daily  pantoprazole    Tablet 40 milliGRAM(s) Oral every 12 hours    PRN MEDICATIONS  ibuprofen  Tablet. 600 milliGRAM(s) Oral every 8 hours PRN  morphine  - Injectable 1 milliGRAM(s) IV Push every 4 hours PRN  ondansetron Injectable 4 milliGRAM(s) IV Push every 8 hours PRN    VITALS:   T(F): 96.4  HR: 86  BP: 135/64  RR: 18  SpO2: 97%    LABS:                        9.8    8.51  )-----------( 285      ( 16 Jul 2020 06:35 )             32.2     07-16    148<H>  |  104  |  15  ----------------------------<  149<H>  4.6   |  33<H>  |  0.5<L>    Ca    9.4      16 Jul 2020 06:35  Mg     2.2     07-16    TPro  6.1  /  Alb  3.2<L>  /  TBili  0.7  /  DBili  x   /  AST  42<H>  /  ALT  104<H>  /  AlkPhos  191<H>  07-16    Culture - Blood (collected 14 Jul 2020 05:49)  Source: .Blood None  Preliminary Report (15 Jul 2020 13:01):    No growth to date.    Culture - Blood (collected 13 Jul 2020 13:20)  Source: .Blood Blood-Peripheral  Preliminary Report (15 Jul 2020 00:04):    No growth to date.    Culture - Blood (collected 13 Jul 2020 13:20)  Source: .Blood Blood-Peripheral  Preliminary Report (15 Jul 2020 00:04):    No growth to date.      RADIOLOGY:    < from: MR Abdomen No Cont (07.13.20 @ 21:37) >    EXAM:  MR ABDOMEN            PROCEDURE DATE:  07/13/2020            INTERPRETATION:    CLINICAL STATEMENT:  Right upper quadrant pain. Cholecystitis status post cholecystostomy. Evaluate for cholangitis.    TECHNIQUE: Axial in- and out-of-phase T1-weighted; axial fat-saturated T2-weighted; axial and coronal single-shot fast spin-echo T2-weighted; 3D high-resolution, heavily T2-weighted images were acquired.    COMPARISON:  CT of earlier the same day.    FINDINGS:    HEPATOBILIARY: Transhepatic cholecystostomy catheter placement. Gallbladder wall thickening and extensive pericholecystic inflammation. Cholelithiasis and gallbladder sludge. Mild intrahepatic ductal prominence without significant intrahepatic ductal dilatation. No significant inflammation surrounding the common bile duct. Common bile duct within the upper limits of normal, no choledocholithiasis. Focal edema in the inferior liver adjacent to the catheter (series 9/20-25; series 12/22), likely due to recent instrumentation.    SPLEEN:  Unremarkable.    PANCREAS:  Unremarkable.    ADRENAL GLANDS:  Unremarkable.    KIDNEYS:  Small bilateral renal cysts. No hydronephrosis.    ABDOMINAL NODES:  No adenopathy.    BONES/SOFT TISSUES:  Degenerative change in the spine. Post median sternotomy.    OTHER:  Post mitral valve replacement. Cardiomegaly.    IMPRESSION:    1. Severe cholecystitis with transhepatic cholecystostomy tube in place.  2. No choledocholithiasis or definite MR evidence of cholangitis.                  KVNG TOSCANO M.D., ATTENDING RADIOLOGIST  This document has been electronically signed. Jul 14 2020  8:24AM    < end of copied text >      < from: US Abdomen Limited (07.13.20 @ 14:48) >    EXAM:  US ABDOMEN LIMITED            PROCEDURE DATE:  07/13/2020            INTERPRETATION:  CLINICAL HISTORY: Right upper quadrant pain, status post biliary drain.    COMPARISON: Abdominal ultrasound dated 6/4/2020. Correlation with same day CT abdomen..    PROCEDURE: Ultrasound of the right upper quadrant was performed.    FINDINGS:    LIVER:  Normal in contour and echogenicity measuring 15.2 cm in length. No focal mass.    GALLBLADDER/BILIARY TREE: Percutaneous cholecystostomy drain is noted. Cholelithiasis and sludge. Mild wall thickening. No pericholecystic fluid. Negative sonographic Mccloud's sign. No intrahepatic biliary ductal dilatation. The common bile duct measures 9 mm, which is at the upper limit of normal for the patient's age.     PANCREAS:  Visualized head and body are unremarkable. Remainder obscured by overlying bowel gas.    KIDNEY:  Right kidney measures 9.9 cm in length. No hydronephrosis, calculi or solid mass.    AORTA/IVC:  Visualized proximal portions unremarkable.    ASCITES:  None.    IMPRESSION:    Status post percutaneous cholecystostomy drain in place.    Cholelithiasis with gallbladder wall thickening.      < end of copied text >      < from: Xray Chest 1 View AP/PA (07.13.20 @ 14:45) >  EXAM:  XR CHEST FRONTAL 1V            PROCEDURE DATE:  07/13/2020            INTERPRETATION:  Clinical History / Reason for exam: Sepsis.    Comparison : Chest radiograph AP portable dated June 4, 2020 time 11:42 PM.    Technique/Positioning: Frontal view time 2:02 PM.    Findings:    Support devices: Sternal wires and surgical clips overlie the mediastinum. There is a prosthetic mitral valve.    Cardiac/mediastinum/hilum: The overall heart size appears moderately enlarged which may be in part related to projection. Dilatation and calcification of the thoracic aorta.    Lung parenchyma/Pleura: Tracheal deviation to the right of midline. Mild prominence of the interstitial markings and fibrotic changes in the left lung base. Small left pleural effusion.    Skeleton/soft tissues: Degenerative changes involving the glenohumeral joints, acromioclavicular joints, and thoracic spine.    Impression:      In comparison with the prior chest x-ray dated June 4, 2020 time 11:42 PM:    Mild increase in the prominence of the interstitial markings may be secondary to pulmonary vascular congestion. Interval development of a small left pleural effusion.      NATHALIA GOYAL M.D., ATTENDING RADIOLOGIST  This document has been electronically signed. Jul 13 2020  3:06PM    < end of copied text >      < from: CT Abdomen and Pelvis w/ IV Cont (07.13.20 @ 14:37) >  EXAM:  CT ABDOMEN AND PELVIS IC            PROCEDURE DATE:  07/13/2020            INTERPRETATION:  REASON FOR EXAM / CLINICAL STATEMENT: RUQ abdominal pain; S/P cholecystostomy    TECHNIQUE: Contiguous axial CT images were obtained from the lower chest to the pubic symphysis with intravenous contrast.   Reformatted images in the coronal and sagittal planes were acquired.    COMPARISON CT: CT scanning of the abdomen pelvis dated 6/4/2020    OTHER STUDIES USED FOR CORRELATION: None.         FINDINGS    LOWER CHEST: There are atelectatic changes at the lung bases. No pleural or pericardial effusion.    HEPATIC: There is hepatic steatosis with no evidence of mass or bile duct dilatation.      BILIARY: Status post transhepatic cholecystostomy. Compared with the previous study of 6/4/2020, the gallbladder is decompressed with a decrease in the pericholecystic inflammatory changes. The pigtail of the catheter is demonstrated within the gallbladder. Calcified stones and gallbladder wall thickening are again noted. There is no evidence of right upper quadrant fluid collection.    SPLEEN: Unremarkable.        PANCREAS: The pancreas is normal in size and configuration. No evidence of mass or pancreatitis.     ADRENAL GLANDS: Unremarkable.        KIDNEYS: There is symmetric bilateral renal enhancement. No evidence of hydronephrosis, calcified stones, or solid mass.     ABDOMINOPELVIC NODES: Unremarkable.    PELVIC ORGANS: No evidence of pelvic mass, lymphadenopathy. A discrete, stable, 4 cmfluid collection is noted in the right common iliac region which may represent a lymphocele.     PERITONEUM/MESENTERY/BOWEL: A hiatus hernia, duodenal diverticulum, and sigmoid diverticula are again noted. There is no evidence of diverticulitis. Status post ventral hernia repair. No evidence of bowel obstruction, colitis, inflammatory process, or ascites within the abdomen or pelvis. The appendix is normal in appearance.    BONES/SOFT TISSUES: Degenerative changes of the spine are noted.    OTHER: No evidence of abdominal aortic aneurysm.      IMPRESSION:     1. Status post transhepatic cholecystostomy. Compared with the previous study of 6/4/2020, the gallbladder is decompressed with a decrease in the pericholecystic inflammatory changes. The pigtail of the catheter is demonstrated within the gallbladder. Calcified stones and gallbladder wall thickening are again noted.        FLO MITCHELL M.D., ATTENDING RADIOLOGIST  This document has been electronically signed. Jul 13 2020  3:52PM    < end of copied text >      PHYSICAL EXAM:  GEN: No acute distress  HEENT: Normocephalic, Atraumatic   LUNGS: Clear to auscultation bilaterally   HEART: S1/S2 present. RRR.   ABD: Soft, diffuse tenderness, non-distended. Bowel sounds present,  EXT: Bilateral upper and lower extremities, no edema, clubbing or cyanosis    NEURO: AAOX3

## 2020-07-16 NOTE — PROGRESS NOTE ADULT - SUBJECTIVE AND OBJECTIVE BOX
CHINA BAÑUELOS  87y Female   [id number]    Hospital Day: 3  Post Operative Day:  Procedure:  Patient is a 87y old  Female who presents with a chief complaint of Abdominal pain (15 Jul 2020 10:00)    PAST MEDICAL & SURGICAL HISTORY:  Coronary artery disease  High cholesterol  CHF (congestive heart failure)  HTN (hypertension)  Cholecystostomy care  S/P CABG (coronary artery bypass graft)      Events of the Last 24h:ercp   Vital Signs Last 24 Hrs  T(C): 36.1 (15 Jul 2020 22:05), Max: 38.1 (15 Jul 2020 05:52)  T(F): 97 (15 Jul 2020 22:05), Max: 100.5 (15 Jul 2020 05:52)  HR: 75 (15 Jul 2020 22:05) (75 - 101)  BP: 123/56 (15 Jul 2020 22:05) (118/55 - 187/76)  BP(mean): --  RR: 18 (15 Jul 2020 22:05) (18 - 20)  SpO2: 99% (15 Jul 2020 22:05) (96% - 100%)        Diet, Clear Liquid (07-15-20 @ 15:46)      I&O's Summary    2020 07:  -  15 Jul 2020 07:00  --------------------------------------------------------  IN: 0 mL / OUT: 1 mL / NET: -1 mL     I&O's Detail    2020 07:01  -  15 Jul 2020 07:00  --------------------------------------------------------  IN:  Total IN: 0 mL    OUT:    Voided: 1 mL  Total OUT: 1 mL    Total NET: -1 mL          MEDICATIONS  (STANDING):  ALBUTerol   0.5% 2.5 milliGRAM(s) Nebulizer once  chlorhexidine 4% Liquid 1 Application(s) Topical <User Schedule>  meropenem  IVPB 1000 milliGRAM(s) IV Intermittent every 8 hours  metoprolol succinate ER 25 milliGRAM(s) Oral daily    MEDICATIONS  (PRN):  ibuprofen  Tablet. 600 milliGRAM(s) Oral every 8 hours PRN Temp greater or equal to 38C (100.4F)  morphine  - Injectable 1 milliGRAM(s) IV Push every 4 hours PRN Moderate Pain (4 - 6)  ondansetron Injectable 4 milliGRAM(s) IV Push every 8 hours PRN Nausea and/or Vomiting      PHYSICAL EXAM:    GENERAL: NAD    HEENT: NCAT    CHEST/LUNGS: CTAB    HEART: RRR,  No murmurs, rubs, or gallops    ABDOMEN: SNTND +BS    EXTREMITIES:  FROM, No clubbing, cyanosis, or edema, palpable pulse    NEURO: No focal neurological deficits    SKIN: No rashes or lesions    INCISION/WOUNDS:                          9.8    13.13 )-----------( 296      ( 15 Jul 2020 05:40 )             33.3        CBC Full  -  ( 15 Jul 2020 05:40 )  WBC Count : 13.13 K/uL  RBC Count : 3.65 M/uL  Hemoglobin : 9.8 g/dL  Hematocrit : 33.3 %  Platelet Count - Automated : 296 K/uL  Mean Cell Volume : 91.2 fL  Mean Cell Hemoglobin : 26.8 pg  Mean Cell Hemoglobin Concentration : 29.4 g/dL  Auto Neutrophil # : 10.54 K/uL  Auto Lymphocyte # : 1.44 K/uL  Auto Monocyte # : 1.02 K/uL  Auto Eosinophil # : 0.04 K/uL  Auto Basophil # : 0.02 K/uL  Auto Neutrophil % : 80.2 %  Auto Lymphocyte % : 11.0 %  Auto Monocyte % : 7.8 %  Auto Eosinophil % : 0.3 %  Auto Basophil % : 0.2 %               146   |  101   |  10                 Ca: 9.3    BMP:   ----------------------------< 132    M.0   (07-15-20 @ 05:40)             3.9    |  30    | 0.6                Ph: x        LFT:     TPro: 6.3 / Alb: 3.2 / TBili: 1.0 / DBili: x / AST: 100 / ALT: 157 / AlkPhos: 221   (07-15-20 @ 05:40)    LIVER FUNCTIONS - ( 15 Jul 2020 05:40 )  Alb: 3.2 g/dL / Pro: 6.3 g/dL / ALK PHOS: 221 U/L / ALT: 157 U/L / AST: 100 U/L / GGT: x                     Culture - Blood (collected 2020 05:49)  Source: .Blood None  Preliminary Report (15 Jul 2020 13:01):    No growth to date.    Culture - Blood (collected 2020 13:20)  Source: .Blood Blood-Peripheral  Preliminary Report (15 Jul 2020 00:04):    No growth to date.    Culture - Blood (collected 2020 13:20)  Source: .Blood Blood-Peripheral  Preliminary Report (15 Jul 2020 00:04):    No growth to date.

## 2020-07-16 NOTE — CONSULT NOTE ADULT - ASSESSMENT
87 year old female with PMHx of CAD s/p CABG, HFpEF, osteoporosis, recently diagnosed cholecystitis s/p percutaneous cholecystostomy and pigtail placement (6/5/2020) presented to the ED with complaints of abdominal pain of 1 week duration. Patient was on     IMPRESSIONS:      RECOMMENDATIONS:        This is a pended note. All final recommendations to follow pending discussion with ID Attending 87 year old female with PMHx of CAD s/p CABG, HFpEF, osteoporosis, recently diagnosed cholecystitis s/p percutaneous cholecystostomy and pigtail placement (6/5/2020) presented to the ED with complaints of abdominal pain of 1 week duration. MRCP showed severe cholecystitis without cholangitis. Patient underwent ERCP with sphincterotomy, stone extraction and CBD stent placement. Infectious Diseases is consulted for antibiotic management.    IMPRESSIONS:  Resolved sepsis secondary to acute on chronic cholecystitis s/p percutaneous cholecystostomy on 6/5  No cholangitis on MRCP, CBD normal  No peritonitis  S/p ERCP with sphincterotomy, stone extraction and CBD stent placement on 7/15  WBC, AST/ALT, T bili and Alk phos improving  Gallbladder fluid 6/5: E. coli (R quinolone) and E. faecium (S amp)       RECOMMENDATIONS:  - D/C meropenem  - Rocephin IV 2 gm q24hrs and Flagyl  mg q8hrs for a total of 7 days post-ERCP (last dose 7/22/2020)  - On discharge can change to Vantin 200mg PO q12hrs and continue Flagyl      This is a pended note. All final recommendations to follow pending discussion with ID Attending 87 year old female with PMHx of CAD s/p CABG, HFpEF, osteoporosis, recently diagnosed cholecystitis s/p percutaneous cholecystostomy and pigtail placement (6/5/2020) presented to the ED with complaints of abdominal pain of 1 week duration. MRCP showed severe cholecystitis without cholangitis. Patient underwent ERCP with sphincterotomy, stone extraction and CBD stent placement. Infectious Diseases is consulted for antibiotic management.    IMPRESSIONS:  Resolved sepsis secondary to acute on chronic cholecystitis s/p percutaneous cholecystostomy on 6/5  Acute cholangitis with possible choledocholithiasis  S/p ERCP with sphincterotomy, stone extraction and CBD stent placement on 7/15  WBC, AST/ALT, T bili and Alk phos improving  Gallbladder fluid 6/5: E. coli (R quinolone) and E. faecium (S amp)  Blood culture NGTD 7/13, 7/14       RECOMMENDATIONS:  - D/C meropenem  - Start ampicillin IV 1 gm q8hrs, Rocephin IV 1 gm q24hrs and Flagyl  mg q8hrs      Case discuss with ID attending. Attending note will follow. 87 year old female with PMHx of CAD s/p CABG, HFpEF, osteoporosis, recently diagnosed cholecystitis s/p percutaneous cholecystostomy and pigtail placement (6/5/2020) presented to the ED with complaints of abdominal pain of 1 week duration. MRCP showed severe cholecystitis without cholangitis. Patient underwent ERCP with sphincterotomy, stone extraction and CBD stent placement. Infectious Diseases is consulted for antibiotic management.    IMPRESSIONS:  Resolved sepsis secondary to acute on chronic cholecystitis s/p percutaneous cholecystostomy on 6/5  Acute cholangitis with possible choledocholithiasis  S/p ERCP with sphincterotomy, stone extraction and CBD stent placement on 7/15  WBC, AST/ALT, T bili and Alk phos improving  Gallbladder fluid 6/5: E. coli (R quinolone) and E. faecium (S amp)  Blood culture NGTD 7/13, 7/14       RECOMMENDATIONS:  - D/C meropenem  - Start ampicillin IV 1 gm q6hrs, Rocephin IV 1 gm q24hrs and Flagyl  mg q8hrs      Case discuss with ID attending. Attending note will follow. 87 year old female with PMHx of CAD s/p CABG, HFpEF, osteoporosis, recently diagnosed cholecystitis s/p percutaneous cholecystostomy and pigtail placement (6/5/2020) presented to the ED with complaints of abdominal pain of 1 week duration. MRCP showed severe cholecystitis without cholangitis. Patient underwent ERCP with sphincterotomy, stone extraction and CBD stent placement. Infectious Diseases is consulted for antibiotic management.    IMPRESSIONS:  Resolved sepsis secondary to acute on chronic cholecystitis s/p percutaneous cholecystostomy on 6/5  Acute cholangitis with possible choledocholithiasis  S/p ERCP with sphincterotomy, stone extraction and CBD stent placement on 7/15  WBC, AST/ALT, T bili and Alk phos improving  Gallbladder fluid 6/5: E. coli (R quinolone) and E. faecium (S amp)  Blood culture NGTD 7/13, 7/14       RECOMMENDATIONS:  - D/C meropenem  - Start ampicillin IV 1 gm q6hrs, Rocephin IV 1 gm q24hrs and Flagyl  mg q8hrs

## 2020-07-16 NOTE — PROGRESS NOTE ADULT - ASSESSMENT
Patient is a 88 y/o  with PMHx of CHF, CAD s/p CABG, HLD, HTN presenting to the ED for acute onset of moderate/severe, constant, RUQ pain radiating to back associated with NBNB vomiting x1 day duration. Patient had Percutaneous tube adjusted and flushed by IR. Patient had ERCP on 7/15 with sphincterotomy, stone extraction, and CBD stent placement. Patient tolerated procedure well. Awaiting AM labs, should have continue decrease in LFT. Would start PPI as twice daily due to irritation noted on ERCP. Will need sten removal in 4-6 weeks and at that time will perform an EGD also.     Choledocholithiasis/  Cholecystitis   - IV hydration  - PPI PO BID  - Blood cultures done this hospitalization without growth, can consider changing Meropenem to a regimen she may go home with if ok with medicine team   - Given age, recent instrumentation, and co-morbidities maybe anticipate tomorrow, but this is up to medicine team  - Will follow AM labs

## 2020-07-16 NOTE — CONSULT NOTE ADULT - ATTENDING COMMENTS
Trae from [facility] [employer] inquiry on f/u for pt status. Informed pt was discharged to [facility]. Caller stated she will f/u with facility on patient status.
I agree with the above
known patient to me, above history, needs the ERCP, able to have it done  avoid volume overload, use PRN Lasix 20 mg in case of pulmonary congestion  use IV fluid as needed, not routinely  if procedure is done under local anesthesia and IV sedation, risk will be less than calculated RCRI score.
I have personally examined the patient and reviewed the documentation above.  Corrections and edits were made wherever needed.

## 2020-07-16 NOTE — CONSULT NOTE ADULT - SUBJECTIVE AND OBJECTIVE BOX
CHINA BAÑUELOS  87y, Female  Allergy: mavis (Rash)  peanuts (Rash)  varicella virus vaccine (Unknown)      CHIEF COMPLAINT: Abdominal pain (16 Jul 2020 07:24)      HPI:  87 year old female with PMHx of CAD s/p CABG, HFpEF, osteoporosis, recently diagnosed cholecystitis s/p percutaneous cholecystostomy and pigtail placement (6/5/2020) presented to the ED with complaints of abdominal pain of 1 week duration. Patient noticed that the cholecystectomy bag had gradually decreasing output since discharge, and it stopped producing fluid 1 week ago. Around that time the patient started experiencing worsening RUQ abdominal pain and on the day of presentation, RUQ pain became severe  and is associated with nausea, loss of appetite and subjective fevers. She denies any cough, chest pain, shortness of breath, runny nose, diarrhea/constipation, or dysuria.   Both her daughter, who was a former RN, and ED resident tried flushing the cholecystostomy tube but there was no return. Of note, she was sent home on a total of 7 days of Vantin 200mg q12hrs, Flagyl 500mg q12hrs, and amoxicillin 875mg q12hrs. CT abdomen/pelvis demonstrated a decompressed gallbladder with a decrease in the pericholecystic inflammatory changes. The pigtail of the catheter is demonstrated within the gallbladder. US abdomen showed cholelithiasis with gallbladder wall thickening. MRCP showed severe cholecystitis without cholangitis. Patient was started on meropenem on admission. Surgery was consulted and no intervention was recommended due to multiple comorbidities. She underwent ERCP with sphincterotomy, stone extraction and CBD stent placement on 7/15 and she tolerated the procedure well.     Infectious Diseases History:  Old Micro Data/Cultures:   Colorectostomy fluid 6/5/2020: E. coli and E. faecium  Blood culture 6/4, 6/5, 7/13, 7/14 NGTD    FAMILY HISTORY:    PAST MEDICAL & SURGICAL HISTORY:  Coronary artery disease  High cholesterol  CHF (congestive heart failure)  HTN (hypertension)  Cholecystostomy care  S/P CABG (coronary artery bypass graft)      SOCIAL HISTORY  Social History:  no smoking alcohol  or substance use (04 Jun 2020 22:19)      ROS  General: Denies rigors, nightsweats  HEENT: Denies headache, rhinorrhea, sore throat, eye pain  CV: Denies CP, palpitations  PULM: Denies wheezing, hemoptysis  GI: Denies hematemesis, hematochezia, melena  : Denies discharge, hematuria  MSK: Denies arthralgias, myalgias  SKIN: Denies rash, lesions  NEURO: Denies paresthesias, weakness  PSYCH: Denies depression, anxiety    VITALS:  T(F): 96.4, Max: 99.6 (07-15-20 @ 11:52)  HR: 86  BP: 135/64  RR: 18Vital Signs Last 24 Hrs  T(C): 35.8 (16 Jul 2020 06:04), Max: 37.6 (15 Jul 2020 11:52)  T(F): 96.4 (16 Jul 2020 06:04), Max: 99.6 (15 Jul 2020 11:52)  HR: 86 (16 Jul 2020 06:04) (75 - 101)  BP: 135/64 (16 Jul 2020 06:04) (118/55 - 187/76)  BP(mean): --  RR: 18 (16 Jul 2020 06:04) (18 - 20)  SpO2: 99% (16 Jul 2020 06:13) (96% - 100%)    PHYSICAL EXAM:  GENERAL: NAD, lying in bed comfortably  HEAD: Atraumatic, Normocephalic  EYES: EOMI, PERRLA, conjunctiva pink and cornea white  ENT: Normal external ears and nose, no discharges; moist mucous membranes, no erythema on posterior oropharynx  NECK: Supple, nontender to palpation; no JVD  CHEST/LUNG: Clear to auscultation bilaterally; No rales, rhonchi, wheezing, or rubs. Unlabored respirations  HEART: Regular rate and rhythm; No murmurs, rubs, or gallops  ABDOMEN: Soft, nontender, nondistended; no rebound tenderness, no guarding; no hepatomegaly; normoactive/hyperactive/hypoactive bowel sounds  EXTREMITIES:  2+ Peripheral Pulses, brisk capillary refill. No clubbing, cyanosis, or petal edema  NERVOUS SYSTEM: Alert and oriented to person, time, place and situation, speech clear. No focal deficits   MSK: FROM all 4 extremities, full and equal strength  SKIN: No rashes or lesions    TESTS & MEASUREMENTS:                        9.8    8.51  )-----------( 285      ( 16 Jul 2020 06:35 )             32.2     07-16    148<H>  |  104  |  15  ----------------------------<  149<H>  4.6   |  33<H>  |  0.5<L>    Ca    9.4      16 Jul 2020 06:35  Mg     2.2     07-16    TPro  6.1  /  Alb  3.2<L>  /  TBili  0.7  /  DBili  x   /  AST  42<H>  /  ALT  104<H>  /  AlkPhos  191<H>  07-16    eGFR if Non African American: 87 mL/min/1.73M2 (07-16-20 @ 06:35)  eGFR if African American: 101 mL/min/1.73M2 (07-16-20 @ 06:35)    LIVER FUNCTIONS - ( 16 Jul 2020 06:35 )  Alb: 3.2 g/dL / Pro: 6.1 g/dL / ALK PHOS: 191 U/L / ALT: 104 U/L / AST: 42 U/L / GGT: x               Culture - Blood (collected 07-14-20 @ 05:49)  Source: .Blood None  Preliminary Report (07-15-20 @ 13:01):    No growth to date.    Culture - Blood (collected 07-13-20 @ 13:20)  Source: .Blood Blood-Peripheral  Preliminary Report (07-15-20 @ 00:04):    No growth to date.    Culture - Blood (collected 07-13-20 @ 13:20)  Source: .Blood Blood-Peripheral  Preliminary Report (07-15-20 @ 00:04):    No growth to date.        Lactate, Blood: 1.2 mmol/L (07-14-20 @ 05:49)  Lactate, Blood: 1.1 mmol/L (07-14-20 @ 00:15)  Lactate, Blood: 1.3 mmol/L (07-13-20 @ 13:20)      INFECTIOUS DISEASES TESTING      RADIOLOGY & ADDITIONAL TESTS:  I have personally reviewed the last available Chest xray  CXR  Xray Chest 1 View AP/PA:   EXAM:  XR CHEST FRONTAL 1V            PROCEDURE DATE:  07/13/2020          INTERPRETATION:  Clinical History / Reason for exam: Sepsis.    Comparison : Chest radiograph AP portable dated June 4, 2020 time 11:42 PM.    Technique/Positioning: Frontal view time 2:02 PM.    Findings:    Support devices: Sternal wires and surgical clips overlie the mediastinum. There is a prosthetic mitral valve.    Cardiac/mediastinum/hilum: The overall heart size appears moderately enlarged which may be in part related to projection. Dilatation and calcification of the thoracic aorta.    Lung parenchyma/Pleura: Tracheal deviation to the right of midline. Mild prominence of the interstitial markings and fibrotic changes in the left lung base. Small left pleural effusion.    Skeleton/soft tissues: Degenerative changes involving the glenohumeral joints, acromioclavicular joints, and thoracic spine.    Impression:      In comparison with the prior chest x-ray dated June 4, 2020 time 11:42 PM:    Mild increase in the prominence of the interstitial markings may be secondary to pulmonary vascular congestion. Interval development of a small left pleural effusion.                  NATHALIA GOYAL M.D., ATTENDING RADIOLOGIST  This document has been electronically signed. Jul 13 2020  3:06PM             (07-13-20 @ 14:45)      CT  CT Abdomen and Pelvis w/ IV Cont:   EXAM:  CT ABDOMEN AND PELVIS IC            PROCEDURE DATE:  07/13/2020            INTERPRETATION:  REASON FOR EXAM / CLINICAL STATEMENT: RUQ abdominal pain; S/P cholecystostomy    TECHNIQUE: Contiguous axial CT images were obtained from the lower chest to the pubic symphysis with intravenous contrast.   Reformatted images in the coronal and sagittal planes were acquired.    COMPARISON CT: CT scanning of the abdomen pelvis dated 6/4/2020    OTHER STUDIES USED FOR CORRELATION: None.         FINDINGS    LOWER CHEST: There are atelectatic changes at the lung bases. No pleural or pericardial effusion.    HEPATIC: There is hepatic steatosis with no evidence of mass or bile duct dilatation.      BILIARY: Status post transhepatic cholecystostomy. Compared with the previous study of 6/4/2020, the gallbladder is decompressed with a decrease in the pericholecystic inflammatory changes. The pigtail of the catheter is demonstrated within the gallbladder. Calcified stones and gallbladder wall thickening are again noted. There is no evidence of right upper quadrant fluid collection.    SPLEEN: Unremarkable.        PANCREAS: The pancreas is normal in size and configuration. No evidence of mass or pancreatitis.     ADRENAL GLANDS: Unremarkable.        KIDNEYS: There is symmetric bilateral renal enhancement. No evidence of hydronephrosis, calcified stones, or solid mass.     ABDOMINOPELVIC NODES: Unremarkable.    PELVIC ORGANS: No evidence of pelvic mass, lymphadenopathy. A discrete, stable, 4 cmfluid collection is noted in the right common iliac region which may represent a lymphocele.     PERITONEUM/MESENTERY/BOWEL: A hiatus hernia, duodenal diverticulum, and sigmoid diverticula are again noted. There is no evidence of diverticulitis. Status post ventral hernia repair. No evidence of bowel obstruction, colitis, inflammatory process, or ascites within the abdomen or pelvis. The appendix is normal in appearance.    BONES/SOFT TISSUES: Degenerative changes of the spine are noted.    OTHER: No evidence of abdominal aortic aneurysm.      IMPRESSION:     1. Status post transhepatic cholecystostomy. Compared with the previous study of 6/4/2020, the gallbladder is decompressed with a decrease in the pericholecystic inflammatory changes. The pigtail of the catheter is demonstrated within the gallbladder. Calcified stones and gallbladder wall thickening are again noted.                  FLO MITCHELL M.D., ATTENDING RADIOLOGIST  This document has been electronically signed. Jul 13 2020  3:52PM             (07-13-20 @ 14:37)      CARDIOLOGY TESTING  12 Lead ECG:   Ventricular Rate 111 BPM    Atrial Rate 111 BPM    P-R Interval 180 ms    QRS Duration 88 ms    Q-T Interval 348 ms    QTC Calculation(Bezet) 473 ms    P Axis 48 degrees    R Axis 10 degrees    T Axis 19 degrees    Diagnosis Line Sinus tachycardia  Otherwise normal ECG    Confirmed by MATILDA SONG MD ([id number]) on 7/13/2020 1:23:18 PM (07-13-20 @ 12:30)      All available historical records have been reviewed    MEDICATIONS  ALBUTerol   0.5% 2.5  chlorhexidine 4% Liquid 1  meropenem  IVPB 1000  metoprolol succinate ER 25  pantoprazole    Tablet 40      ANTIBIOTICS:  meropenem  IVPB 1000 milliGRAM(s) IV Intermittent every 8 hours (7/13 - present)      All available historical data has been reviewed CHINA BAÑUELOS  87y, Female  Allergy: mavis (Rash)  peanuts (Rash)  varicella virus vaccine (Unknown)      CHIEF COMPLAINT: Abdominal pain (16 Jul 2020 07:24)      HPI:  87 year old female with PMHx of CAD s/p CABG, HFpEF, osteoporosis, recently diagnosed cholecystitis s/p percutaneous cholecystostomy and pigtail placement (6/5/2020) presented to the ED with complaints of abdominal pain of 1 week duration. Patient noticed that the cholecystectomy bag had gradually decreasing output since discharge, and it stopped producing fluid 1 week ago. Around that time the patient started experiencing worsening RUQ abdominal pain and on the day of presentation, RUQ pain became severe  and is associated with nausea, loss of appetite and subjective fevers. She denies any cough, chest pain, shortness of breath, runny nose, diarrhea/constipation, or dysuria.   Both her daughter, who was a former RN, and ED resident tried flushing the cholecystostomy tube but there was no return. Of note, she was sent home on a total of 7 days of Vantin 200mg q12hrs, Flagyl 500mg q12hrs, and amoxicillin 875mg q12hrs. CT abdomen/pelvis demonstrated a decompressed gallbladder with a decrease in the pericholecystic inflammatory changes. The pigtail of the catheter is demonstrated within the gallbladder. US abdomen showed cholelithiasis with gallbladder wall thickening. MRCP showed severe cholecystitis without cholangitis. Patient was started on meropenem on admission. Surgery was consulted and no intervention was recommended due to multiple comorbidities. She underwent ERCP with sphincterotomy, stone extraction and CBD stent placement on 7/15 and she tolerated the procedure well.     Infectious Diseases History:  Old Micro Data/Cultures:   Colorectostomy fluid 6/5/2020: E. coli and E. faecium  Blood culture 6/4, 6/5, 7/13, 7/14 NGTD    FAMILY HISTORY:    PAST MEDICAL & SURGICAL HISTORY:  Coronary artery disease  High cholesterol  CHF (congestive heart failure)  HTN (hypertension)  Cholecystostomy care  S/P CABG (coronary artery bypass graft)      SOCIAL HISTORY  Social History:  no smoking alcohol  or substance use (04 Jun 2020 22:19)      ROS  General: Denies rigors, nightsweats  HEENT: Denies headache, rhinorrhea, sore throat, eye pain  CV: Denies CP, palpitations  PULM: Denies wheezing, hemoptysis  GI: Denies hematemesis, hematochezia, melena  : Denies discharge, hematuria  MSK: Denies arthralgias, myalgias  SKIN: Denies rash, lesions  NEURO: Denies paresthesias, weakness  PSYCH: Denies depression, anxiety    VITALS:  T(F): 96.4, Max: 99.6 (07-15-20 @ 11:52)  HR: 86  BP: 135/64  RR: 18Vital Signs Last 24 Hrs  T(C): 35.8 (16 Jul 2020 06:04), Max: 37.6 (15 Jul 2020 11:52)  T(F): 96.4 (16 Jul 2020 06:04), Max: 99.6 (15 Jul 2020 11:52)  HR: 86 (16 Jul 2020 06:04) (75 - 101)  BP: 135/64 (16 Jul 2020 06:04) (118/55 - 187/76)  BP(mean): --  RR: 18 (16 Jul 2020 06:04) (18 - 20)  SpO2: 99% (16 Jul 2020 06:13) (96% - 100%)    PHYSICAL EXAM:  GENERAL: NAD, lying in bed comfortably  HEAD: Atraumatic, Normocephalic  EYES: EOMI, PERRLA, conjunctiva pink and cornea white  ENT: Normal external ears and nose, no discharges; moist mucous membranes, no erythema on posterior oropharynx  NECK: Supple, nontender to palpation; no JVD  CHEST/LUNG: Bibasilar crackles and decreased breath sounds  HEART: Regular rate and rhythm; No murmurs, rubs, or gallops  ABDOMEN: Soft and nondistended abdomen, mildly tender to palpation in RUQ and epigastric region; no rebound tenderness, no guarding; no hepatomegaly; normoactive bowel sounds; cholecystostomy tube in place, draining scant green drainage, opening clean and without erythema  EXTREMITIES:  2+ Peripheral Pulses, brisk capillary refill. No clubbing, cyanosis, or petal edema  NERVOUS SYSTEM: Alert and oriented to person, time, place and situation, speech clear. No focal deficits   MSK: FROM all 4 extremities, full and equal strength  SKIN: No rashes or lesions    TESTS & MEASUREMENTS:                        9.8    8.51  )-----------( 285      ( 16 Jul 2020 06:35 )             32.2     07-16    148<H>  |  104  |  15  ----------------------------<  149<H>  4.6   |  33<H>  |  0.5<L>    Ca    9.4      16 Jul 2020 06:35  Mg     2.2     07-16    TPro  6.1  /  Alb  3.2<L>  /  TBili  0.7  /  DBili  x   /  AST  42<H>  /  ALT  104<H>  /  AlkPhos  191<H>  07-16    eGFR if Non African American: 87 mL/min/1.73M2 (07-16-20 @ 06:35)  eGFR if African American: 101 mL/min/1.73M2 (07-16-20 @ 06:35)    LIVER FUNCTIONS - ( 16 Jul 2020 06:35 )  Alb: 3.2 g/dL / Pro: 6.1 g/dL / ALK PHOS: 191 U/L / ALT: 104 U/L / AST: 42 U/L / GGT: x               Culture - Blood (collected 07-14-20 @ 05:49)  Source: .Blood None  Preliminary Report (07-15-20 @ 13:01):    No growth to date.    Culture - Blood (collected 07-13-20 @ 13:20)  Source: .Blood Blood-Peripheral  Preliminary Report (07-15-20 @ 00:04):    No growth to date.    Culture - Blood (collected 07-13-20 @ 13:20)  Source: .Blood Blood-Peripheral  Preliminary Report (07-15-20 @ 00:04):    No growth to date.        Lactate, Blood: 1.2 mmol/L (07-14-20 @ 05:49)  Lactate, Blood: 1.1 mmol/L (07-14-20 @ 00:15)  Lactate, Blood: 1.3 mmol/L (07-13-20 @ 13:20)      INFECTIOUS DISEASES TESTING      RADIOLOGY & ADDITIONAL TESTS:  I have personally reviewed the last available Chest xray  CXR  Xray Chest 1 View AP/PA:   EXAM:  XR CHEST FRONTAL 1V            PROCEDURE DATE:  07/13/2020          INTERPRETATION:  Clinical History / Reason for exam: Sepsis.    Comparison : Chest radiograph AP portable dated June 4, 2020 time 11:42 PM.    Technique/Positioning: Frontal view time 2:02 PM.    Findings:    Support devices: Sternal wires and surgical clips overlie the mediastinum. There is a prosthetic mitral valve.    Cardiac/mediastinum/hilum: The overall heart size appears moderately enlarged which may be in part related to projection. Dilatation and calcification of the thoracic aorta.    Lung parenchyma/Pleura: Tracheal deviation to the right of midline. Mild prominence of the interstitial markings and fibrotic changes in the left lung base. Small left pleural effusion.    Skeleton/soft tissues: Degenerative changes involving the glenohumeral joints, acromioclavicular joints, and thoracic spine.    Impression:      In comparison with the prior chest x-ray dated June 4, 2020 time 11:42 PM:    Mild increase in the prominence of the interstitial markings may be secondary to pulmonary vascular congestion. Interval development of a small left pleural effusion.                  NATHALIA GOYAL M.D., ATTENDING RADIOLOGIST  This document has been electronically signed. Jul 13 2020  3:06PM             (07-13-20 @ 14:45)      CT  CT Abdomen and Pelvis w/ IV Cont:   EXAM:  CT ABDOMEN AND PELVIS IC            PROCEDURE DATE:  07/13/2020            INTERPRETATION:  REASON FOR EXAM / CLINICAL STATEMENT: RUQ abdominal pain; S/P cholecystostomy    TECHNIQUE: Contiguous axial CT images were obtained from the lower chest to the pubic symphysis with intravenous contrast.   Reformatted images in the coronal and sagittal planes were acquired.    COMPARISON CT: CT scanning of the abdomen pelvis dated 6/4/2020    OTHER STUDIES USED FOR CORRELATION: None.         FINDINGS    LOWER CHEST: There are atelectatic changes at the lung bases. No pleural or pericardial effusion.    HEPATIC: There is hepatic steatosis with no evidence of mass or bile duct dilatation.      BILIARY: Status post transhepatic cholecystostomy. Compared with the previous study of 6/4/2020, the gallbladder is decompressed with a decrease in the pericholecystic inflammatory changes. The pigtail of the catheter is demonstrated within the gallbladder. Calcified stones and gallbladder wall thickening are again noted. There is no evidence of right upper quadrant fluid collection.    SPLEEN: Unremarkable.        PANCREAS: The pancreas is normal in size and configuration. No evidence of mass or pancreatitis.     ADRENAL GLANDS: Unremarkable.        KIDNEYS: There is symmetric bilateral renal enhancement. No evidence of hydronephrosis, calcified stones, or solid mass.     ABDOMINOPELVIC NODES: Unremarkable.    PELVIC ORGANS: No evidence of pelvic mass, lymphadenopathy. A discrete, stable, 4 cmfluid collection is noted in the right common iliac region which may represent a lymphocele.     PERITONEUM/MESENTERY/BOWEL: A hiatus hernia, duodenal diverticulum, and sigmoid diverticula are again noted. There is no evidence of diverticulitis. Status post ventral hernia repair. No evidence of bowel obstruction, colitis, inflammatory process, or ascites within the abdomen or pelvis. The appendix is normal in appearance.    BONES/SOFT TISSUES: Degenerative changes of the spine are noted.    OTHER: No evidence of abdominal aortic aneurysm.      IMPRESSION:     1. Status post transhepatic cholecystostomy. Compared with the previous study of 6/4/2020, the gallbladder is decompressed with a decrease in the pericholecystic inflammatory changes. The pigtail of the catheter is demonstrated within the gallbladder. Calcified stones and gallbladder wall thickening are again noted.                  FLO MITCHELL M.D., ATTENDING RADIOLOGIST  This document has been electronically signed. Jul 13 2020  3:52PM             (07-13-20 @ 14:37)      CARDIOLOGY TESTING  12 Lead ECG:   Ventricular Rate 111 BPM    Atrial Rate 111 BPM    P-R Interval 180 ms    QRS Duration 88 ms    Q-T Interval 348 ms    QTC Calculation(Bezet) 473 ms    P Axis 48 degrees    R Axis 10 degrees    T Axis 19 degrees    Diagnosis Line Sinus tachycardia  Otherwise normal ECG    Confirmed by MATILDA SONG MD ([id number]) on 7/13/2020 1:23:18 PM (07-13-20 @ 12:30)      All available historical records have been reviewed    MEDICATIONS  ALBUTerol   0.5% 2.5  chlorhexidine 4% Liquid 1  meropenem  IVPB 1000  metoprolol succinate ER 25  pantoprazole    Tablet 40      ANTIBIOTICS:  meropenem  IVPB 1000 milliGRAM(s) IV Intermittent every 8 hours (7/13 - present)      All available historical data has been reviewed

## 2020-07-17 ENCOUNTER — TRANSCRIPTION ENCOUNTER (OUTPATIENT)
Age: 85
End: 2020-07-17

## 2020-07-17 VITALS
SYSTOLIC BLOOD PRESSURE: 150 MMHG | HEART RATE: 89 BPM | TEMPERATURE: 98 F | RESPIRATION RATE: 18 BRPM | DIASTOLIC BLOOD PRESSURE: 66 MMHG

## 2020-07-17 LAB
ALBUMIN SERPL ELPH-MCNC: 2.9 G/DL — LOW (ref 3.5–5.2)
ALP SERPL-CCNC: 153 U/L — HIGH (ref 30–115)
ALT FLD-CCNC: 71 U/L — HIGH (ref 0–41)
ANION GAP SERPL CALC-SCNC: 12 MMOL/L — SIGNIFICANT CHANGE UP (ref 7–14)
AST SERPL-CCNC: 24 U/L — SIGNIFICANT CHANGE UP (ref 0–41)
BASOPHILS # BLD AUTO: 0.01 K/UL — SIGNIFICANT CHANGE UP (ref 0–0.2)
BASOPHILS NFR BLD AUTO: 0.1 % — SIGNIFICANT CHANGE UP (ref 0–1)
BILIRUB SERPL-MCNC: 0.5 MG/DL — SIGNIFICANT CHANGE UP (ref 0.2–1.2)
BUN SERPL-MCNC: 18 MG/DL — SIGNIFICANT CHANGE UP (ref 10–20)
CALCIUM SERPL-MCNC: 9.3 MG/DL — SIGNIFICANT CHANGE UP (ref 8.5–10.1)
CHLORIDE SERPL-SCNC: 102 MMOL/L — SIGNIFICANT CHANGE UP (ref 98–110)
CO2 SERPL-SCNC: 31 MMOL/L — SIGNIFICANT CHANGE UP (ref 17–32)
CREAT SERPL-MCNC: 0.5 MG/DL — LOW (ref 0.7–1.5)
EOSINOPHIL # BLD AUTO: 0.01 K/UL — SIGNIFICANT CHANGE UP (ref 0–0.7)
EOSINOPHIL NFR BLD AUTO: 0.1 % — SIGNIFICANT CHANGE UP (ref 0–8)
GLUCOSE SERPL-MCNC: 111 MG/DL — HIGH (ref 70–99)
HCT VFR BLD CALC: 31.5 % — LOW (ref 37–47)
HGB BLD-MCNC: 9.7 G/DL — LOW (ref 12–16)
IMM GRANULOCYTES NFR BLD AUTO: 0.4 % — HIGH (ref 0.1–0.3)
LYMPHOCYTES # BLD AUTO: 1.18 K/UL — LOW (ref 1.2–3.4)
LYMPHOCYTES # BLD AUTO: 10.4 % — LOW (ref 20.5–51.1)
MAGNESIUM SERPL-MCNC: 2.3 MG/DL — SIGNIFICANT CHANGE UP (ref 1.8–2.4)
MCHC RBC-ENTMCNC: 27.3 PG — SIGNIFICANT CHANGE UP (ref 27–31)
MCHC RBC-ENTMCNC: 30.8 G/DL — LOW (ref 32–37)
MCV RBC AUTO: 88.7 FL — SIGNIFICANT CHANGE UP (ref 81–99)
MONOCYTES # BLD AUTO: 1.15 K/UL — HIGH (ref 0.1–0.6)
MONOCYTES NFR BLD AUTO: 10.1 % — HIGH (ref 1.7–9.3)
NEUTROPHILS # BLD AUTO: 8.96 K/UL — HIGH (ref 1.4–6.5)
NEUTROPHILS NFR BLD AUTO: 78.9 % — HIGH (ref 42.2–75.2)
NRBC # BLD: 0 /100 WBCS — SIGNIFICANT CHANGE UP (ref 0–0)
PLATELET # BLD AUTO: 327 K/UL — SIGNIFICANT CHANGE UP (ref 130–400)
POTASSIUM SERPL-MCNC: 3.9 MMOL/L — SIGNIFICANT CHANGE UP (ref 3.5–5)
POTASSIUM SERPL-SCNC: 3.9 MMOL/L — SIGNIFICANT CHANGE UP (ref 3.5–5)
PROT SERPL-MCNC: 5.9 G/DL — LOW (ref 6–8)
RBC # BLD: 3.55 M/UL — LOW (ref 4.2–5.4)
RBC # FLD: 15.3 % — HIGH (ref 11.5–14.5)
SODIUM SERPL-SCNC: 145 MMOL/L — SIGNIFICANT CHANGE UP (ref 135–146)
WBC # BLD: 11.36 K/UL — HIGH (ref 4.8–10.8)
WBC # FLD AUTO: 11.36 K/UL — HIGH (ref 4.8–10.8)

## 2020-07-17 PROCEDURE — 99239 HOSP IP/OBS DSCHRG MGMT >30: CPT

## 2020-07-17 RX ORDER — ASPIRIN/CALCIUM CARB/MAGNESIUM 324 MG
1 TABLET ORAL
Qty: 30 | Refills: 0
Start: 2020-07-17 | End: 2020-08-15

## 2020-07-17 RX ORDER — PANTOPRAZOLE SODIUM 20 MG/1
1 TABLET, DELAYED RELEASE ORAL
Qty: 60 | Refills: 0
Start: 2020-07-17 | End: 2020-08-15

## 2020-07-17 RX ADMIN — Medication 100 MILLIGRAM(S): at 05:40

## 2020-07-17 RX ADMIN — Medication 81 MILLIGRAM(S): at 12:11

## 2020-07-17 RX ADMIN — PANTOPRAZOLE SODIUM 40 MILLIGRAM(S): 20 TABLET, DELAYED RELEASE ORAL at 05:40

## 2020-07-17 RX ADMIN — Medication 25 MILLIGRAM(S): at 05:40

## 2020-07-17 RX ADMIN — Medication 108 GRAM(S): at 05:39

## 2020-07-17 RX ADMIN — HEPARIN SODIUM 5000 UNIT(S): 5000 INJECTION INTRAVENOUS; SUBCUTANEOUS at 05:39

## 2020-07-17 NOTE — DISCHARGE NOTE PROVIDER - PROVIDER TOKENS
PROVIDER:[TOKEN:[[id number]],FOLLOWUP:[2 weeks]],PROVIDER:[TOKEN:[[id number]],FOLLOWUP:[1 week]]

## 2020-07-17 NOTE — DISCHARGE NOTE PROVIDER - NSDCMRMEDTOKEN_GEN_ALL_CORE_FT
alendronate 70 mg oral tablet: 1 tab(s) orally once a week  folic acid 1 mg oral tablet: 1 tab(s) orally once a day  Fosamax 70 mg oral tablet: 1 tab(s) orally once a week  furosemide 40 mg oral tablet: 1 tab(s) orally once a day  Lipitor 40 mg oral tablet: 1 tab(s) orally once a day  metOLazone 2.5 mg oral tablet: 1 tab(s) orally once a day  Toprol-XL 25 mg oral tablet, extended release: 1 tab(s) orally once a day  Vitamin D3: alendronate 70 mg oral tablet: 1 tab(s) orally once a week  aspirin 81 mg oral delayed release tablet: 1 tab(s) orally once a day  Augmentin 500 mg-125 mg oral tablet: 1 tab(s) orally 2 times a day   cefpodoxime 100 mg oral tablet: 1 tab(s) orally 2 times a day   folic acid 1 mg oral tablet: 1 tab(s) orally once a day  Fosamax 70 mg oral tablet: 1 tab(s) orally once a week  furosemide 40 mg oral tablet: 1 tab(s) orally once a day  Lipitor 40 mg oral tablet: 1 tab(s) orally once a day  metOLazone 2.5 mg oral tablet: 1 tab(s) orally once a day  pantoprazole 40 mg oral delayed release tablet: 1 tab(s) orally every 12 hours  Toprol-XL 25 mg oral tablet, extended release: 1 tab(s) orally once a day  Vitamin D3:

## 2020-07-17 NOTE — PROGRESS NOTE ADULT - ASSESSMENT
87y Female patient HD 2 w/PMH of CAD s/p CABG, CHF, osteoporosis, recently diagnosed cholecystitis s/p cholecystectomy (6/5) presenting with worsening RUQ abd pain and decreasing PCT drain output admitted for cholecystitis which is being managed nonsurgically. THE IR checked that the pigtail is placed correctly. the patient went with GI for ERCP with stone removal, sphincterotomy and CBD STENT PLACEMENT. THE patient LFTS is trending down to normal values     Plan:  - No acute surgical intervention at this time: poor surgical candidate specifically elevated cardiac risk factors   -Pain control as needed  -Hemodynamic monitoring as per routine  -Encourage ambulation and incentive spirometer use (10x/hr when awake)  -GI and DVT prophylaxis  -Strict input and output monitoring  please Reconsult back if you have any questions

## 2020-07-17 NOTE — DISCHARGE NOTE PROVIDER - INSTRUCTIONS
Be sure to eat a balanced diet of fruits, vegetables and whole grains. Avoid fatty, salty foods. Be sure to drink enough fluids throughout the day.

## 2020-07-17 NOTE — DISCHARGE NOTE PROVIDER - CARE PROVIDER_API CALL
Juarez Ryan  GASTROENTEROLOGY  [address] [place] [address]  Phone: [phone]  Fax: [phone]  Follow Up Time: 2 weeks    Landau, Elliot (MD)  Radiology  [address] [place] [id number]  Phone: [phone]  Fax: [phone]  Follow Up Time: 1 week

## 2020-07-17 NOTE — DISCHARGE NOTE PROVIDER - NSDCCPCAREPLAN_GEN_ALL_CORE_FT
PRINCIPAL DISCHARGE DIAGNOSIS  Diagnosis: Sepsis  Assessment and Plan of Treatment: You presented to the hospital with fevers and belly pain. Oral antibiotics was prescribed, please take them as prescribed.    Sepsis is a serious condition that occurs when the body overreacts to an infection. It is also called systemic inflammatory response syndrome (SIRS) with infection. An infection is usually caused by bacteria that attack the body. The body's defense system normally fights off infection within the affected body part. With sepsis, the body overreacts and causes symptoms to occur throughout the body. This leads to uncontrolled and widespread inflammation and clotting in small blood vessels. Blood flow to different body parts decreases and may lead to organ failure. Sepsis requires immediate treatment.  You were treated in the hospital with IV antibiotics, and your symptoms resolved.   Please seek immediate medical attention if you develop fever >100.4 F, feel dizzy, have nausea or vomiting, coughing blood, have sudden trouble breathing or chest pain, severe weakness, or your skin or lips are turning blue.        SECONDARY DISCHARGE DIAGNOSES  Diagnosis: Cholangitis  Assessment and Plan of Treatment: You presented to hospital with a recent history of cholecystostomy, the tube connecting to the gall bladder was not draing, Interventional Radiology readjusted the catherter to ensure continued drainage. On MRI, stones was also seen with inflammation in your gall baldder. Gastroenterology performed an operation to extract the stones and stents were placed to ensure drainage of bile into the intestine. PRINCIPAL DISCHARGE DIAGNOSIS  Diagnosis: Cholecystitis  Assessment and Plan of Treatment: You presented to hospital with a recent history of cholecystostomy, the tube connecting to the gall bladder was not draing, Interventional Radiology readjusted the catherter to ensure continued drainage. On MRI, stones and inflammation in your gall baldder was seen. Gastroenterology performed a procedure to extract the stones and stents were placed to ensure drainage of bile into the intestine.   We have started you on Pantoprazole to minimize the amount of acid in your stomach. Please continue to take this as prescribed.  You will be needing to take antibiotics for 8 days. Starting tomorrow, 7/18, take your Augmentin and Vantin as prescribed until 7/25.   Please follow up with IR for management of your cholecystostomy. Follow up with Dr. Ryan per routine.   **We have started you on aspirin 81 mg daily while here in the hospital. This is beneficial for your heart given that you have a history of heart disease. Please conitnue to take this daily.  **Please resume your Lasix and Metolazone tomorrow, 7/18.**  Ensure that your are eating adequately and drinking enough fluids.  Please follow up with your primary care doctor, Dr. Walls, to obtain blood work early next week and monitor your liver and kidney function.

## 2020-07-17 NOTE — DISCHARGE NOTE PROVIDER - HOSPITAL COURSE
HPI:    87 year old female  came into the ED with a 1 week hx of RUQ abd pain and decreasing cholecystectomy bag output. On the day of ED visit, developed nausea, loss of appetite and subjective fevers. Patient has a PMHx of CAD s/p CABG, CHF, osteoporosis, recently diagnosed cholecystitis s/p cholecystectomy (6/5). Denies any cough, chest pain, runny nose, dysuria. Daughter (former RN), flushed cholecystectomy tube with no return. Was also flushed by ED resident, no output. On admission, patient received IV LR fluids, morphine, tylenol, Motrin. Urine culture, CBC, CMP, Blood cultures, HFP, Blood lactate, and PT and INR was ordered. US abdomen showed Cholelithiasis with gallbladder wall thickening, Chest x-ray showed pleural effusion with pulmonary vascular congestion, CT abdomen showed a decompressed gall bladder with confirmation of calcified stones and gall bladder wall thickening and MRI abdominal showed severe cholecystitis with transhepatic cholecystostomy tube in place with no choledocholithiasis or cholangitis.  Patient was evaluated for potential surgery, cardiology and surgery determined that patient was a poor candidate for surgery due to cardiac and other comorbidities. Patient was provided IV abx for potential sepsis, IR was consulted to readjust cholecystostomy tube given no output and GI was consulted for ERCP. Patient underwent ERCP with sphincterotomy, stone extraction and CBD stent placement. Post surgery, patients lab showed improvement in WBC, ALT/AST, Tbili, and Alk Phos. Patient initially received IV Meropenem, based on cultures transitioned to Flagyl, Rocephin, and ampicillin. Patient will be discharged on PO Augmentin 500 mg q12, Vantin 100mg Q12 for 8 days. HPI:    87 year old female  came into the ED with a 1 week hx of RUQ abd pain and decreasing cholecystectomy bag output. On the day of ED visit, developed nausea, loss of appetite and subjective fevers. Patient has a PMHx of CAD s/p CABG, CHF, osteoporosis, recently diagnosed cholecystitis s/p cholecystectomy (6/5). Denies any cough, chest pain, runny nose, dysuria. Daughter (former RN), flushed cholecystectomy tube with no return. Was also flushed by ED resident, no output. On admission, patient received IV LR fluids, morphine, tylenol, Motrin. Urine culture, CBC, CMP, Blood cultures, HFP, Blood lactate, and PT and INR was ordered. US abdomen showed Cholelithiasis with gallbladder wall thickening, Chest x-ray showed pleural effusion with pulmonary vascular congestion, CT abdomen showed a decompressed gall bladder with confirmation of calcified stones and gall bladder wall thickening and MRI abdominal showed severe cholecystitis with transhepatic cholecystostomy tube in place with no choledocholithiasis or cholangitis.  Patient was evaluated for potential surgery, cardiology and surgery determined that patient was a poor candidate for surgery due to cardiac and other comorbidities. Patient was provided IV abx for potential sepsis, IR was consulted to readjust cholecystostomy tube given no output and GI was consulted for ERCP. Patient underwent ERCP with sphincterotomy, stone extraction and CBD stent placement. Post procedure, patients lab showed improvement in WBC, ALT/AST, Tbili, and Alk Phos. Patient initially received IV Meropenem, based on cultures transitioned to Flagyl, Rocephin, and ampicillin. Patient will be discharged on PO Augmentin 500 mg q12, Vantin 100mg Q12 for 8 days.

## 2020-07-17 NOTE — PROGRESS NOTE ADULT - SUBJECTIVE AND OBJECTIVE BOX
GENERAL SURGERY PROGRESS NOTE     CHINA BAÑUELOS  Female  Hospital day :4d  POD:  Procedure:   OVERNIGHT EVENTS: no acute overnight events     T(F): 97.1 (07-17-20 @ 05:00), Max: 99.6 (07-16-20 @ 13:24)  HR: 84 (07-17-20 @ 05:00) (84 - 91)  BP: 148/65 (07-17-20 @ 05:00) (128/62 - 148/65)  RR: 18 (07-17-20 @ 05:00) (18 - 18)  SpO2: 97% (07-16-20 @ 10:27) (94% - 97%)    DIET/FLUIDS:   NG:                                                                                DRAINS:   07-16-20 @ 07:01  -  07-17-20 @ 07:00  --------------------------------------------------------  OUT: 0 mL       GI proph:  pantoprazole    Tablet 40 milliGRAM(s) Oral every 12 hours    AC/ proph: aspirin enteric coated 81 milliGRAM(s) Oral daily  heparin   Injectable 5000 Unit(s) SubCutaneous every 12 hours    ABx: ampicillin  IVPB 1 Gram(s) IV Intermittent every 6 hours  cefTRIAXone   IVPB 1000 milliGRAM(s) IV Intermittent every 24 hours  metroNIDAZOLE  IVPB 500 milliGRAM(s) IV Intermittent every 8 hours      PHYSICAL EXAM:  GENERAL: NAD, well-appearing  CHEST/LUNG: Clear to auscultation bilaterally  HEART: Regular rate and rhythm  ABDOMEN: Soft, Nontender, Nondistended;   EXTREMITIES:  No clubbing, cyanosis, or edema      LABS  Labs:  CAPILLARY BLOOD GLUCOSE                              9.7    11.36 )-----------( 327      ( 17 Jul 2020 05:51 )             31.5       Auto Immature Granulocyte %: 0.4 % (07-17-20 @ 05:51)  Auto Neutrophil %: 78.9 % (07-17-20 @ 05:51)    07-17    145  |  102  |  18  ----------------------------<  111<H>  3.9   |  31  |  0.5<L>      Calcium, Total Serum: 9.3 mg/dL (07-17-20 @ 05:51)      LFTs:             5.9  | 0.5  | 24       ------------------[153     ( 17 Jul 2020 05:51 )  2.9  | x    | 71                          RADIOLOGY & ADDITIONAL TESTS:  no new images

## 2020-07-17 NOTE — PROGRESS NOTE ADULT - ASSESSMENT
87 year old female with PMHx of CAD s/p CABG, HFpEF, osteoporosis, recently diagnosed cholecystitis s/p percutaneous cholecystostomy and pigtail placement (6/5/2020) presented to the ED with complaints of abdominal pain of 1 week duration. MRCP showed severe cholecystitis without cholangitis. Patient underwent ERCP with sphincterotomy, stone extraction and CBD stent placement. Infectious Diseases is consulted for antibiotic management.    IMPRESSIONS:  Resolved sepsis secondary to acute on chronic cholecystitis s/p percutaneous cholecystostomy on 6/5  Acute cholangitis with possible choledocholithiasis  S/p ERCP with sphincterotomy, stone extraction and CBD stent placement on 7/15  WBC, AST/ALT, T bili and Alk phos improving  Gallbladder fluid 6/5: E. coli (R quinolone) and E. faecium (S amp)  Blood culture NGTD 7/13, 7/14       RECOMMENDATIONS:  po Augmentin 500 mg q12h and po Vantin 100 mg q12h for 8 more days  recall prn please

## 2020-07-17 NOTE — PROGRESS NOTE ADULT - SUBJECTIVE AND OBJECTIVE BOX
SARMADCHINA ESPARZA  87y, Female    All available historical data reviewed    OVERNIGHT EVENTS:  no fevers  feels well and has no complaints   minimal abdominal discomfort    ROS:  General: Denies rigors, nightsweats  HEENT: Denies headache, rhinorrhea, sore throat, eye pain  CV: Denies CP, palpitations  PULM: Denies wheezing, hemoptysis  GI: Denies hematemesis, hematochezia, melena  : Denies discharge, hematuria  MSK: Denies arthralgias, myalgias  SKIN: Denies rash, lesions  NEURO: Denies paresthesias, weakness  PSYCH: Denies depression, anxiety    VITALS:  T(F): 97.1, Max: 99.6 (07-16-20 @ 13:24)  HR: 84  BP: 148/65  RR: 18Vital Signs Last 24 Hrs  T(C): 36.2 (17 Jul 2020 05:00), Max: 37.6 (16 Jul 2020 13:24)  T(F): 97.1 (17 Jul 2020 05:00), Max: 99.6 (16 Jul 2020 13:24)  HR: 84 (17 Jul 2020 05:00) (84 - 91)  BP: 148/65 (17 Jul 2020 05:00) (128/62 - 148/65)  BP(mean): --  RR: 18 (17 Jul 2020 05:00) (18 - 18)  SpO2: 97% (16 Jul 2020 10:27) (94% - 97%)    TESTS & MEASUREMENTS:                        9.7    11.36 )-----------( 327      ( 17 Jul 2020 05:51 )             31.5     07-17    145  |  102  |  18  ----------------------------<  111<H>  3.9   |  31  |  0.5<L>    Ca    9.3      17 Jul 2020 05:51  Mg     2.3     07-17    TPro  5.9<L>  /  Alb  2.9<L>  /  TBili  0.5  /  DBili  x   /  AST  24  /  ALT  71<H>  /  AlkPhos  153<H>  07-17    LIVER FUNCTIONS - ( 17 Jul 2020 05:51 )  Alb: 2.9 g/dL / Pro: 5.9 g/dL / ALK PHOS: 153 U/L / ALT: 71 U/L / AST: 24 U/L / GGT: x             Culture - Blood (collected 07-14-20 @ 05:49)  Source: .Blood None  Preliminary Report (07-15-20 @ 13:01):    No growth to date.    Culture - Blood (collected 07-13-20 @ 13:20)  Source: .Blood Blood-Peripheral  Preliminary Report (07-15-20 @ 00:04):    No growth to date.    Culture - Blood (collected 07-13-20 @ 13:20)  Source: .Blood Blood-Peripheral  Preliminary Report (07-15-20 @ 00:04):    No growth to date.            RADIOLOGY & ADDITIONAL TESTS:  Personal review of radiological diagnostics performed  Echo and EKG results noted when applicable.     MEDICATIONS:  ALBUTerol   0.5% 2.5 milliGRAM(s) Nebulizer once  ampicillin  IVPB 1 Gram(s) IV Intermittent every 6 hours  aspirin enteric coated 81 milliGRAM(s) Oral daily  cefTRIAXone   IVPB 1000 milliGRAM(s) IV Intermittent every 24 hours  chlorhexidine 4% Liquid 1 Application(s) Topical <User Schedule>  heparin   Injectable 5000 Unit(s) SubCutaneous every 12 hours  ibuprofen  Tablet. 600 milliGRAM(s) Oral three times a day PRN  metoprolol succinate ER 25 milliGRAM(s) Oral daily  metroNIDAZOLE  IVPB 500 milliGRAM(s) IV Intermittent every 8 hours  morphine  - Injectable 1 milliGRAM(s) IV Push every 4 hours PRN  ondansetron Injectable 4 milliGRAM(s) IV Push every 8 hours PRN  pantoprazole    Tablet 40 milliGRAM(s) Oral every 12 hours      ANTIBIOTICS:  ampicillin  IVPB 1 Gram(s) IV Intermittent every 6 hours  cefTRIAXone   IVPB 1000 milliGRAM(s) IV Intermittent every 24 hours  metroNIDAZOLE  IVPB 500 milliGRAM(s) IV Intermittent every 8 hours

## 2020-07-17 NOTE — CHART NOTE - NSCHARTNOTEFT_GEN_A_CORE
<<<RESIDENT DISCHARGE NOTE>>>     CHINA BAÑUELOS  MRN-[id number]    VITAL SIGNS:  T(F): 97.1 (07-17-20 @ 05:00), Max: 99.6 (07-16-20 @ 13:24)  HR: 84 (07-17-20 @ 05:00)  BP: 148/65 (07-17-20 @ 05:00)  SpO2: --      PHYSICAL EXAMINATION:  General: Pt is ambulating in room, on RA, saturating 93%, NAD  Head & Neck: NC, AT  Pulmonary: Mild rales in bilateral bases  Cardiovascular: RRR  Gastrointestinal/Abdomen & Pelvis: soft, mildly tender RUQ, cholecystostomy in place, BS +  Neurologic/Motor: AAOX4    TEST RESULTS:                        9.7    11.36 )-----------( 327      ( 17 Jul 2020 05:51 )             31.5       07-17    145  |  102  |  18  ----------------------------<  111<H>  3.9   |  31  |  0.5<L>    Ca    9.3      17 Jul 2020 05:51  Mg     2.3     07-17    TPro  5.9<L>  /  Alb  2.9<L>  /  TBili  0.5  /  DBili  x   /  AST  24  /  ALT  71<H>  /  AlkPhos  153<H>  07-17      FINAL DISCHARGE INTERVIEW:  Resident(s) Present: Heber Malone    DISCHARGE MEDICATION RECONCILIATION  reviewed with Attending: Dr. Mata    DISPOSITION:   [  ] Home,    [ x ] Home with Visiting Nursing Services,   [    ]  SNF/ NH,    [   ] Acute Rehab (4A),   [   ] Other (Specify:_________)

## 2020-07-18 LAB
CULTURE RESULTS: SIGNIFICANT CHANGE UP
CULTURE RESULTS: SIGNIFICANT CHANGE UP
SPECIMEN SOURCE: SIGNIFICANT CHANGE UP
SPECIMEN SOURCE: SIGNIFICANT CHANGE UP

## 2020-07-18 RX ORDER — CEFPODOXIME PROXETIL 100 MG
1 TABLET ORAL
Qty: 16 | Refills: 0
Start: 2020-07-18 | End: 2020-07-25

## 2020-07-20 LAB
CULTURE RESULTS: SIGNIFICANT CHANGE UP
SPECIMEN SOURCE: SIGNIFICANT CHANGE UP

## 2020-07-25 LAB
CULTURE RESULTS: SIGNIFICANT CHANGE UP
SPECIMEN SOURCE: SIGNIFICANT CHANGE UP

## 2020-07-27 NOTE — CDI QUERY NOTE - NSCDIOTHERTXTBX_GEN_ALL_CORE_HH
DOCUMENTATION CLARIFICATION FORM     Encounter #: [id number]                                                     Patient’s Name: Khushbu Simmons  Medical Record #: [id number]                                           Admit Date: 7-  CDI Specialist/: Jamarcus                                           Contact #: [phone]    Dear Dr. Mata,                         Date: 7-                  The Physician’s or Provider’s documentation of the patient’s presentation, evaluation and  medical management, as identified below, may support a diagnosis that is not documented in the medical record.  In order to accurately capture all diagnoses to the greatest degree of specificity reflecting the patient’s actual severity of illness, the documentation in this patient’s medical record requires additional clarification.  Please include more specific documentation, either known or suspected, of a corresponding diagnosis associated with the clinical information described below in your Progress Note and/or Discharge Summary.    •	7-16 Blood Sodium 148  •	7-16 PN “#hyperna- FWD 0.8-encourgae”  •	7-16 Orders LR @50ml/hr, Encourage PO  •	7-17 Blood Sodium Level 145    Based on your clinical judgment, can you provide a diagnosis that represents the above clinical indicators?     (  ) Hypernatremia   (  ) Other (please specify) __________  (  ) Unable to clinically determine    Present on Admission:  Was the condition present on admission, if so, please document in the chart that “(the condition) was present on admission.” DOCUMENTATION CLARIFICATION FORM     Encounter #: [id number]                                                     Patient’s Name: Khushbu Simmons  Medical Record #: [id number]                                           Admit Date: 7-  CDI Specialist/: Jamarcus                                           Contact #: [phone]    Dear Dr. Mata,                         Date: 7-                  The Physician’s or Provider’s documentation of the patient’s presentation, evaluation and  medical management, as identified below, may support a diagnosis that is not documented in the medical record.  In order to accurately capture all diagnoses to the greatest degree of specificity reflecting the patient’s actual severity of illness, the documentation in this patient’s medical record requires additional clarification.  Please include more specific documentation, either known or suspected, of a corresponding diagnosis associated with the clinical information described below in your Progress Note and/or Discharge Summary.    •	7-16 Blood Sodium 148  •	7-16 PN “#hyperna- FWD 0.8-encourgae”  •	7-16 Orders LR @50ml/hr, Encourage PO  •	7-17 Blood Sodium Level 145    Based on your clinical judgment, can you provide a diagnosis that represents the above clinical indicators?     (  ) Hypernatremia   (  ) Other (please specify) __________  (  ) Unable to clinically determine

## 2020-07-27 NOTE — CDI QUERY NOTE - NSCDIOTHERTXTBX_GEN_ALL_CORE_HH
DOCUMENTATION CLARIFICATION FORM     Encounter #: [id number]                                                   Patient’s Name: Khushbu Simmons  Medical Record #: [id number]                                         Admit Date: 7-  CDI Specialist/: Jamarcus                                         Contact #: [phone]    Dear Dr. Mata,                             Date: 7-    The Physician’s or Provider’s documentation of the patient’s presentation, evaluation and  medical management, as identified below, may support a diagnosis that is not documented in the medical record.  In order to accurately capture all diagnoses to the greatest degree of specificity reflecting the patient’s actual severity of illness, the documentation in this patient’s medical record requires additional clarification.  Please include more specific documentation, either known or suspected, of a corresponding diagnosis associated with the clinical information described below in your Progress Note and/or Discharge Summary.    •	Last ECHO 6-: Summary: 1- Left ventricular ejection fraction, by visual estimation, is 55 to 60% 2- Moderately decreased mitral leaflet mobility. 3- Severe mitral stenosis. 4- Status post mitral annular ring insertion. 5- Mild tricuspid regurgitation. 6- Pulmonic valve regurgitation.   •	H&P states:” HFpEF - as per [place] in 2018 [not in acute exacerbation at this time]  •	7-13 Chest XR- Impression:  In comparison with the prior chest x-ray dated June 4, 2020 time 11:42 PM: Mild increase in the prominence of the interstitial markings may be secondary to pulmonary vascular congestion. Interval development of a small left pleural effusion.  •	7-14 Cardiology Consult states: “Decompensated heart failure, or worsening or new-onset heart failure. Severe MS - mild fluid overload currently”. “showing signs of pulmonary congestion (crackles, CXR...) with her severe MS she can go into pulmonary edema quickly.”  •	7-14 Treatment: IVF discontinued, Lasix 40mg IVP    Based upon your professional judgment, and the above clinical indictors please further specify the diagnosis of fluid overload.  (  ) Mild exacerbation of  HFpEF   (  ) Other ( please specify)_______________  (  ) Unable to clinically determine      Documentation clarification is required for compliance, accuracy in coding and billing, and reporting severity of illness, quality data   and risk of mortality.  --------------------------------------------------------------------------------------------------------------------------------------------  DO NOT REMOVE THIS RECORD WITHOUT FIRST NOTIFYING THE CDI SPECIALIST  This form is NOT a part of the permanent Medical Record.

## 2020-07-27 NOTE — CDI QUERY NOTE - NSCDI_DOCCLARIFY2_GEN_ALL_CORE_FT
home Documentation clarification is required for accuracy in coding and billing, claim validation and reporting severity of illness, quality data and risk of mortality.

## 2020-07-30 DIAGNOSIS — D50.9 IRON DEFICIENCY ANEMIA, UNSPECIFIED: ICD-10-CM

## 2020-07-30 DIAGNOSIS — Z95.1 PRESENCE OF AORTOCORONARY BYPASS GRAFT: ICD-10-CM

## 2020-07-30 DIAGNOSIS — K20.9 ESOPHAGITIS, UNSPECIFIED: ICD-10-CM

## 2020-07-30 DIAGNOSIS — K83.09 OTHER CHOLANGITIS: ICD-10-CM

## 2020-07-30 DIAGNOSIS — I05.0 RHEUMATIC MITRAL STENOSIS: ICD-10-CM

## 2020-07-30 DIAGNOSIS — I50.33 ACUTE ON CHRONIC DIASTOLIC (CONGESTIVE) HEART FAILURE: ICD-10-CM

## 2020-07-30 DIAGNOSIS — K29.70 GASTRITIS, UNSPECIFIED, WITHOUT BLEEDING: ICD-10-CM

## 2020-07-30 DIAGNOSIS — K57.10 DIVERTICULOSIS OF SMALL INTESTINE WITHOUT PERFORATION OR ABSCESS WITHOUT BLEEDING: ICD-10-CM

## 2020-07-30 DIAGNOSIS — I25.10 ATHEROSCLEROTIC HEART DISEASE OF NATIVE CORONARY ARTERY WITHOUT ANGINA PECTORIS: ICD-10-CM

## 2020-07-30 DIAGNOSIS — I11.0 HYPERTENSIVE HEART DISEASE WITH HEART FAILURE: ICD-10-CM

## 2020-07-30 DIAGNOSIS — E87.0 HYPEROSMOLALITY AND HYPERNATREMIA: ICD-10-CM

## 2020-07-30 DIAGNOSIS — M81.0 AGE-RELATED OSTEOPOROSIS WITHOUT CURRENT PATHOLOGICAL FRACTURE: ICD-10-CM

## 2020-07-30 DIAGNOSIS — Z98.890 OTHER SPECIFIED POSTPROCEDURAL STATES: ICD-10-CM

## 2020-07-30 DIAGNOSIS — R10.11 RIGHT UPPER QUADRANT PAIN: ICD-10-CM

## 2020-07-30 DIAGNOSIS — E78.00 PURE HYPERCHOLESTEROLEMIA, UNSPECIFIED: ICD-10-CM

## 2020-07-30 DIAGNOSIS — K80.66 CALCULUS OF GALLBLADDER AND BILE DUCT WITH ACUTE AND CHRONIC CHOLECYSTITIS WITHOUT OBSTRUCTION: ICD-10-CM

## 2020-07-30 DIAGNOSIS — A41.9 SEPSIS, UNSPECIFIED ORGANISM: ICD-10-CM

## 2020-07-30 DIAGNOSIS — E87.70 FLUID OVERLOAD, UNSPECIFIED: ICD-10-CM

## 2020-07-30 DIAGNOSIS — Z93.8 OTHER ARTIFICIAL OPENING STATUS: ICD-10-CM

## 2020-07-30 DIAGNOSIS — Z91.010 ALLERGY TO PEANUTS: ICD-10-CM

## 2020-07-31 PROBLEM — I25.10 ATHEROSCLEROTIC HEART DISEASE OF NATIVE CORONARY ARTERY WITHOUT ANGINA PECTORIS: Chronic | Status: ACTIVE | Noted: 2020-07-13

## 2020-08-03 ENCOUNTER — APPOINTMENT (OUTPATIENT)
Dept: GASTROENTEROLOGY | Facility: CLINIC | Age: 85
End: 2020-08-03
Payer: MEDICARE

## 2020-08-03 DIAGNOSIS — K20.9 ESOPHAGITIS, UNSPECIFIED: ICD-10-CM

## 2020-08-03 DIAGNOSIS — K80.50 CALCULUS OF BILE DUCT W/OUT CHOLANGITIS OR CHOLECYSTITIS W/OUT OBSTRUCTION: ICD-10-CM

## 2020-08-03 PROCEDURE — 99214 OFFICE O/P EST MOD 30 MIN: CPT | Mod: 95

## 2020-08-03 NOTE — ASSESSMENT
[FreeTextEntry1] : Choledocholithiasis - had ERCP 7/15 with stent placement; not candidate for CCY per surgery\par - spoke to pt's family - after CBD stent is removed there is a chance she may have recurrent choledocholithiasis if another stone falls into the bile duct as her gallbladder cannot be removed; we will attempt to extend sphincterotomy to decrease that risk\par - pt has already been referred to IR to trial clamping the drain\par - family needs to schedule pt's ERCP - will discuss timing and call our office back; I informed them the stent should be taken out before 10/15\par \par Esophagitis - will perform EGD at same time as ERCP

## 2020-08-03 NOTE — HISTORY OF PRESENT ILLNESS
[Home] : at home, [unfilled] , at the time of the visit. [Medical Office: ([facility])___] : at the medical office located in  [FreeTextEntry3] : Daughter - Cookiea  [FreeTextEntry4] : Kristi Barton [de-identified] : 86 y/o F with hx of CAD s/p CABG, CHF, osteoporosis who presents for hospital follow up. \par \par Patient presented to [facility] with acute cholecystitis in July, was deemed a poor surgical candidate and had a percutaneous cholecystostomy tube placed. She was then readmitted for abd pain, MRCP showed inflammation of the gallbladder, cholecystostomy tube in place, but pt continued to have fevers. Decision was made to perform EUS, possible ERCP. EUS showed a stone in the distal CBD. ERCP was then performed, sphincterotomy was done and stone and sludge was removed. A 10Fr biliary stent was placed. \par \par Since procedure pt has been doing well, not having abdominal pain or fevers. Still has cholecystostomy tube in place.

## 2020-08-03 NOTE — PHYSICAL EXAM
[General Appearance - Alert] : alert [Extraocular Movements] : extraocular movements were intact [General Appearance - Well Nourished] : well nourished [Sclera] : the sclera and conjunctiva were normal [Hearing Threshold Finger Rub Not [place]] : hearing was normal [Outer Ear] : the ears and nose were normal in appearance [Neck Appearance] : the appearance of the neck was normal [Neck Cervical Mass (___cm)] : no neck mass was observed [Exaggerated Use Of Accessory Muscles For Inspiration] : no accessory muscle use [Involuntary Movements] : no involuntary movements were seen [] : no rash [Affect] : the affect was normal [Skin Color & Pigmentation] : normal skin color and pigmentation [No Focal Deficits] : no focal deficits

## 2020-08-22 ENCOUNTER — LABORATORY RESULT (OUTPATIENT)
Age: 85
End: 2020-08-22

## 2020-08-22 ENCOUNTER — OUTPATIENT (OUTPATIENT)
Dept: OUTPATIENT SERVICES | Facility: HOSPITAL | Age: 85
LOS: 1 days | Discharge: HOME | End: 2020-08-22

## 2020-08-22 DIAGNOSIS — Z43.4 ENCOUNTER FOR ATTENTION TO OTHER ARTIFICIAL OPENINGS OF DIGESTIVE TRACT: Chronic | ICD-10-CM

## 2020-08-22 DIAGNOSIS — Z11.59 ENCOUNTER FOR SCREENING FOR OTHER VIRAL DISEASES: ICD-10-CM

## 2020-08-22 DIAGNOSIS — Z95.1 PRESENCE OF AORTOCORONARY BYPASS GRAFT: Chronic | ICD-10-CM

## 2020-08-25 ENCOUNTER — OUTPATIENT (OUTPATIENT)
Dept: OUTPATIENT SERVICES | Facility: HOSPITAL | Age: 85
LOS: 1 days | Discharge: HOME | End: 2020-08-25
Payer: MEDICARE

## 2020-08-25 ENCOUNTER — RESULT REVIEW (OUTPATIENT)
Age: 85
End: 2020-08-25

## 2020-08-25 ENCOUNTER — TRANSCRIPTION ENCOUNTER (OUTPATIENT)
Age: 85
End: 2020-08-25

## 2020-08-25 VITALS
HEIGHT: 57 IN | TEMPERATURE: 98 F | WEIGHT: 128.97 LBS | DIASTOLIC BLOOD PRESSURE: 69 MMHG | HEART RATE: 95 BPM | RESPIRATION RATE: 18 BRPM | SYSTOLIC BLOOD PRESSURE: 171 MMHG

## 2020-08-25 VITALS
DIASTOLIC BLOOD PRESSURE: 66 MMHG | SYSTOLIC BLOOD PRESSURE: 167 MMHG | HEART RATE: 88 BPM | OXYGEN SATURATION: 97 % | RESPIRATION RATE: 18 BRPM

## 2020-08-25 DIAGNOSIS — Z43.4 ENCOUNTER FOR ATTENTION TO OTHER ARTIFICIAL OPENINGS OF DIGESTIVE TRACT: Chronic | ICD-10-CM

## 2020-08-25 DIAGNOSIS — Z95.1 PRESENCE OF AORTOCORONARY BYPASS GRAFT: Chronic | ICD-10-CM

## 2020-08-25 PROCEDURE — 43264 ERCP REMOVE DUCT CALCULI: CPT

## 2020-08-25 PROCEDURE — 74328 X-RAY BILE DUCT ENDOSCOPY: CPT | Mod: 26,XU

## 2020-08-25 PROCEDURE — 47536 EXCHANGE BILIARY DRG CATH: CPT

## 2020-08-25 PROCEDURE — 88312 SPECIAL STAINS GROUP 1: CPT | Mod: 26

## 2020-08-25 PROCEDURE — 88305 TISSUE EXAM BY PATHOLOGIST: CPT | Mod: 26

## 2020-08-25 NOTE — PROGRESS NOTE ADULT - SUBJECTIVE AND OBJECTIVE BOX
INTERVENTIONAL RADIOLOGY BRIEF-OPERATIVE NOTE    Procedure: image guided cholecystostogram with possible removal and possible exchange    Pre-Op Diagnosis: biliary obstruction    Post-Op Diagnosis: biliary obstruction    Attending: Darin  Resident: Trinh    Anesthesia (type):  [ ] General Anesthesia  [ ] Sedation  [ ] Spinal Anesthesia  [ x] Local/Regional     Contrast: 10cc    Estimated Blood Loss: Minimal, < 5 cc    Condition:   [ ] Critical  [ ] Serious  [ ] Fair   [ c] Good    Findings/Follow up Plan of Care: Cholecystostogram was performed demonstrating a patent cystic duct but dilated and  obstructed distal CBD with contrast refluxing into the intrahepatic biliary tree. The previous catheter was removed and a new 8Fr biliary drainage catheter was placed. Pt tolerated the procedure well.    Specimens Removed: as above    Implants: as above    Complications: none immediate    Disposition: discharge to home.      Please call Interventional Radiology [phone] with any questions, concerns, or issues.

## 2020-08-25 NOTE — PRE PROCEDURE NOTE - PRE PROCEDURE EVALUATION
Interventional Radiology Outpatient Documentation    PREOPERATIVE DAY OF PROCEDURE EVALUATION:     I have personally seen and examined this patient. I agree with the history and physical which I have reviewed and noted any changes below:     Plan is for cholecystostogram and possible tube removal or exchange.  08-25-20 @ 15:47    Procedure/ risks/ benefits/ goals/ alternatives were explained. All questions answered. Informed content obtained from patient. Consent placed in chart.

## 2020-08-25 NOTE — H&P PST ADULT - HISTORY OF PRESENT ILLNESS
87 female with percutaneous cholecystostomy, choledocholithiasis s/p ERCP and CBD stent placement is here for ERCP and stent removal and also EGD for esophagitis

## 2020-08-25 NOTE — ASU DISCHARGE PLAN (ADULT/PEDIATRIC) - CALL YOUR DOCTOR IF YOU HAVE ANY OF THE FOLLOWING:
Pain not relieved by Medications/Nausea and vomiting that does not stop/Unable to urinate/Swelling that gets worse/Fever greater than (need to indicate Fahrenheit or Celsius)/Excessive diarrhea/Increased irritability or sluggishness/Bleeding that does not stop/Inability to tolerate liquids or foods/Numbness, tingling, color or temperature change to extremity

## 2020-08-27 RX ORDER — PANTOPRAZOLE 40 MG/1
40 TABLET, DELAYED RELEASE ORAL DAILY
Qty: 30 | Refills: 6 | Status: ACTIVE | COMMUNITY
Start: 2020-08-27 | End: 1900-01-01

## 2020-08-31 DIAGNOSIS — Z48.03 ENCOUNTER FOR CHANGE OR REMOVAL OF DRAINS: ICD-10-CM

## 2020-09-01 LAB — SURGICAL PATHOLOGY STUDY: SIGNIFICANT CHANGE UP

## 2020-09-02 DIAGNOSIS — Z88.7 ALLERGY STATUS TO SERUM AND VACCINE: ICD-10-CM

## 2020-09-02 DIAGNOSIS — K22.70 BARRETT'S ESOPHAGUS WITHOUT DYSPLASIA: ICD-10-CM

## 2020-09-02 DIAGNOSIS — K29.50 UNSPECIFIED CHRONIC GASTRITIS WITHOUT BLEEDING: ICD-10-CM

## 2020-09-02 DIAGNOSIS — E78.00 PURE HYPERCHOLESTEROLEMIA, UNSPECIFIED: ICD-10-CM

## 2020-09-02 DIAGNOSIS — K83.8 OTHER SPECIFIED DISEASES OF BILIARY TRACT: ICD-10-CM

## 2020-09-02 DIAGNOSIS — Z95.1 PRESENCE OF AORTOCORONARY BYPASS GRAFT: ICD-10-CM

## 2020-09-02 DIAGNOSIS — I50.9 HEART FAILURE, UNSPECIFIED: ICD-10-CM

## 2020-09-02 DIAGNOSIS — K57.10 DIVERTICULOSIS OF SMALL INTESTINE WITHOUT PERFORATION OR ABSCESS WITHOUT BLEEDING: ICD-10-CM

## 2020-09-02 DIAGNOSIS — K44.9 DIAPHRAGMATIC HERNIA WITHOUT OBSTRUCTION OR GANGRENE: ICD-10-CM

## 2020-09-02 DIAGNOSIS — I25.10 ATHEROSCLEROTIC HEART DISEASE OF NATIVE CORONARY ARTERY WITHOUT ANGINA PECTORIS: ICD-10-CM

## 2020-09-02 DIAGNOSIS — I11.0 HYPERTENSIVE HEART DISEASE WITH HEART FAILURE: ICD-10-CM

## 2020-09-02 DIAGNOSIS — Z79.82 LONG TERM (CURRENT) USE OF ASPIRIN: ICD-10-CM

## 2020-09-22 ENCOUNTER — OUTPATIENT (OUTPATIENT)
Dept: OUTPATIENT SERVICES | Facility: HOSPITAL | Age: 85
LOS: 1 days | Discharge: HOME | End: 2020-09-22
Payer: MEDICARE

## 2020-09-22 ENCOUNTER — RESULT REVIEW (OUTPATIENT)
Age: 85
End: 2020-09-22

## 2020-09-22 DIAGNOSIS — Z43.4 ENCOUNTER FOR ATTENTION TO OTHER ARTIFICIAL OPENINGS OF DIGESTIVE TRACT: Chronic | ICD-10-CM

## 2020-09-22 DIAGNOSIS — Z95.1 PRESENCE OF AORTOCORONARY BYPASS GRAFT: Chronic | ICD-10-CM

## 2020-09-22 PROCEDURE — 47531 INJECTION FOR CHOLANGIOGRAM: CPT

## 2020-09-22 NOTE — PROCEDURE NOTE - PROCEDURE FINDINGS AND DETAILS
Cholecystostogram demonstrating a patent cystic duct and common bile duct with drainage into the duodenum. Successful removal of drainage catheter.

## 2020-09-24 DIAGNOSIS — Z48.03 ENCOUNTER FOR CHANGE OR REMOVAL OF DRAINS: ICD-10-CM

## 2020-10-30 ENCOUNTER — EMERGENCY (EMERGENCY)
Facility: HOSPITAL | Age: 85
LOS: 0 days | Discharge: HOME | End: 2020-10-30
Attending: EMERGENCY MEDICINE | Admitting: EMERGENCY MEDICINE
Payer: MEDICARE

## 2020-10-30 VITALS
OXYGEN SATURATION: 96 % | HEIGHT: 57 IN | RESPIRATION RATE: 18 BRPM | HEART RATE: 89 BPM | SYSTOLIC BLOOD PRESSURE: 202 MMHG | DIASTOLIC BLOOD PRESSURE: 77 MMHG | TEMPERATURE: 98 F

## 2020-10-30 VITALS — HEART RATE: 80 BPM | DIASTOLIC BLOOD PRESSURE: 69 MMHG | SYSTOLIC BLOOD PRESSURE: 164 MMHG

## 2020-10-30 DIAGNOSIS — I50.9 HEART FAILURE, UNSPECIFIED: ICD-10-CM

## 2020-10-30 DIAGNOSIS — Z91.018 ALLERGY TO OTHER FOODS: ICD-10-CM

## 2020-10-30 DIAGNOSIS — Z95.1 PRESENCE OF AORTOCORONARY BYPASS GRAFT: Chronic | ICD-10-CM

## 2020-10-30 DIAGNOSIS — K62.5 HEMORRHAGE OF ANUS AND RECTUM: ICD-10-CM

## 2020-10-30 DIAGNOSIS — Z43.4 ENCOUNTER FOR ATTENTION TO OTHER ARTIFICIAL OPENINGS OF DIGESTIVE TRACT: Chronic | ICD-10-CM

## 2020-10-30 DIAGNOSIS — Z90.49 ACQUIRED ABSENCE OF OTHER SPECIFIED PARTS OF DIGESTIVE TRACT: ICD-10-CM

## 2020-10-30 DIAGNOSIS — I11.0 HYPERTENSIVE HEART DISEASE WITH HEART FAILURE: ICD-10-CM

## 2020-10-30 DIAGNOSIS — E78.00 PURE HYPERCHOLESTEROLEMIA, UNSPECIFIED: ICD-10-CM

## 2020-10-30 DIAGNOSIS — Z88.8 ALLERGY STATUS TO OTHER DRUGS, MEDICAMENTS AND BIOLOGICAL SUBSTANCES: ICD-10-CM

## 2020-10-30 DIAGNOSIS — Z95.1 PRESENCE OF AORTOCORONARY BYPASS GRAFT: ICD-10-CM

## 2020-10-30 DIAGNOSIS — I25.10 ATHEROSCLEROTIC HEART DISEASE OF NATIVE CORONARY ARTERY WITHOUT ANGINA PECTORIS: ICD-10-CM

## 2020-10-30 DIAGNOSIS — R10.9 UNSPECIFIED ABDOMINAL PAIN: ICD-10-CM

## 2020-10-30 DIAGNOSIS — Z91.010 ALLERGY TO PEANUTS: ICD-10-CM

## 2020-10-30 LAB
ALBUMIN SERPL ELPH-MCNC: 3.7 G/DL — SIGNIFICANT CHANGE UP (ref 3.5–5.2)
ALP SERPL-CCNC: 96 U/L — SIGNIFICANT CHANGE UP (ref 30–115)
ALT FLD-CCNC: 21 U/L — SIGNIFICANT CHANGE UP (ref 0–41)
ANION GAP SERPL CALC-SCNC: 17 MMOL/L — HIGH (ref 7–14)
APTT BLD: 33.9 SEC — SIGNIFICANT CHANGE UP (ref 27–39.2)
AST SERPL-CCNC: 19 U/L — SIGNIFICANT CHANGE UP (ref 0–41)
BASOPHILS # BLD AUTO: 0.03 K/UL — SIGNIFICANT CHANGE UP (ref 0–0.2)
BASOPHILS NFR BLD AUTO: 0.4 % — SIGNIFICANT CHANGE UP (ref 0–1)
BILIRUB SERPL-MCNC: 0.3 MG/DL — SIGNIFICANT CHANGE UP (ref 0.2–1.2)
BLD GP AB SCN SERPL QL: SIGNIFICANT CHANGE UP
BUN SERPL-MCNC: 27 MG/DL — HIGH (ref 10–20)
CALCIUM SERPL-MCNC: 10.1 MG/DL — SIGNIFICANT CHANGE UP (ref 8.5–10.1)
CHLORIDE SERPL-SCNC: 99 MMOL/L — SIGNIFICANT CHANGE UP (ref 98–110)
CO2 SERPL-SCNC: 27 MMOL/L — SIGNIFICANT CHANGE UP (ref 17–32)
CREAT SERPL-MCNC: 0.8 MG/DL — SIGNIFICANT CHANGE UP (ref 0.7–1.5)
EOSINOPHIL # BLD AUTO: 0.11 K/UL — SIGNIFICANT CHANGE UP (ref 0–0.7)
EOSINOPHIL NFR BLD AUTO: 1.5 % — SIGNIFICANT CHANGE UP (ref 0–8)
GLUCOSE SERPL-MCNC: 109 MG/DL — HIGH (ref 70–99)
HCT VFR BLD CALC: 34.2 % — LOW (ref 37–47)
HGB BLD-MCNC: 10.7 G/DL — LOW (ref 12–16)
IMM GRANULOCYTES NFR BLD AUTO: 0.4 % — HIGH (ref 0.1–0.3)
INR BLD: 1.1 RATIO — SIGNIFICANT CHANGE UP (ref 0.65–1.3)
LYMPHOCYTES # BLD AUTO: 1.98 K/UL — SIGNIFICANT CHANGE UP (ref 1.2–3.4)
LYMPHOCYTES # BLD AUTO: 26.2 % — SIGNIFICANT CHANGE UP (ref 20.5–51.1)
MCHC RBC-ENTMCNC: 27.2 PG — SIGNIFICANT CHANGE UP (ref 27–31)
MCHC RBC-ENTMCNC: 31.3 G/DL — LOW (ref 32–37)
MCV RBC AUTO: 86.8 FL — SIGNIFICANT CHANGE UP (ref 81–99)
MONOCYTES # BLD AUTO: 1.06 K/UL — HIGH (ref 0.1–0.6)
MONOCYTES NFR BLD AUTO: 14 % — HIGH (ref 1.7–9.3)
NEUTROPHILS # BLD AUTO: 4.34 K/UL — SIGNIFICANT CHANGE UP (ref 1.4–6.5)
NEUTROPHILS NFR BLD AUTO: 57.5 % — SIGNIFICANT CHANGE UP (ref 42.2–75.2)
NRBC # BLD: 0 /100 WBCS — SIGNIFICANT CHANGE UP (ref 0–0)
PLATELET # BLD AUTO: 359 K/UL — SIGNIFICANT CHANGE UP (ref 130–400)
POTASSIUM SERPL-MCNC: 3.7 MMOL/L — SIGNIFICANT CHANGE UP (ref 3.5–5)
POTASSIUM SERPL-SCNC: 3.7 MMOL/L — SIGNIFICANT CHANGE UP (ref 3.5–5)
PROT SERPL-MCNC: 6.6 G/DL — SIGNIFICANT CHANGE UP (ref 6–8)
PROTHROM AB SERPL-ACNC: 12.6 SEC — SIGNIFICANT CHANGE UP (ref 9.95–12.87)
RBC # BLD: 3.94 M/UL — LOW (ref 4.2–5.4)
RBC # FLD: 15.4 % — HIGH (ref 11.5–14.5)
SODIUM SERPL-SCNC: 143 MMOL/L — SIGNIFICANT CHANGE UP (ref 135–146)
WBC # BLD: 7.55 K/UL — SIGNIFICANT CHANGE UP (ref 4.8–10.8)
WBC # FLD AUTO: 7.55 K/UL — SIGNIFICANT CHANGE UP (ref 4.8–10.8)

## 2020-10-30 PROCEDURE — 99284 EMERGENCY DEPT VISIT MOD MDM: CPT

## 2020-10-30 NOTE — ED ADULT NURSE NOTE - INTERPRETATION SERVICES DECLINED
Patient/Caregiver requests family/friend to interpret./daughter at bedside, translating; refuses to use the  phone at this time

## 2020-10-30 NOTE — ED PROVIDER NOTE - PROVIDER TOKENS
PROVIDER:[TOKEN:[[id number]],FOLLOWUP:[1-3 Days]],PROVIDER:[TOKEN:[[id number]],FOLLOWUP:[1-3 Days]]

## 2020-10-30 NOTE — ED PROVIDER NOTE - NS ED ROS FT
CONSTITUTIONAL: (-) fevers, (-) chills, (-) malaise, (-) decreased appetite  EYES: (-) vision changes, (-) blurry vision  ENT: (-) congestion, (-) rhinorrhea, (-) sore throat  NECK: (-) neck pain, (-) neck stiffness  CARDIO: (-) chest pain, (-) palpitations  PULM: (-) cough, (-) sputum, (-) chest tightness, (-) shortness of breath  GI: see HPI  : (-) dysuria, (-) hematuria, (-) frequency, (-) urgency, (-) flank pain  ENDO: (-) polyuria, (-) polydipsia, (-) polyphagia  HEME: (-) easy bruising, (-) easy bleeding  MSK: (-) back pain, (-) myalgias, (-) gait difficulty  SKIN: (-) rashes, (-) pallor, (-) jaundice  NEURO: (-) headache, (-) dizziness, (-) lightheadedness, (-) weakness, (-) syncope    *all other systems negative except as documented above and in the HPI*

## 2020-10-30 NOTE — ED PROVIDER NOTE - CLINICAL SUMMARY MEDICAL DECISION MAKING FREE TEXT BOX
88yo F presents for small volume BRBPR since last night. No melena. No systemic symptoms. Hb stable. Vitals WNL. F/u GI. Return precautions given. Luca Tipton (Attending)

## 2020-10-30 NOTE — ED PROVIDER NOTE - PATIENT PORTAL LINK FT
You can access the FollowMyHealth Patient Portal offered by [facility] by registering at the following website: [email or web address]. By joining [facility]’s FollowMyHealth portal, you will also be able to view your health information using other applications (apps) compatible with our system.

## 2020-10-30 NOTE — ED PROVIDER NOTE - PROGRESS NOTE DETAILS
PREET: Patient w/o rectal bleeding during ED stay. Results discussed with patient and daughter, printed copies given. Given GI f/u. Patient agreeable and verbalizes understanding of plan of care, f/u, and return precautions.

## 2020-10-30 NOTE — ED PROVIDER NOTE - PHYSICAL EXAMINATION
VITALS:  I have reviewed the initial vital signs.  GENERAL: Well-developed, well-nourished, in no acute distress. Nontoxic. Daughter at bedside.  HEENT: Sclera clear. No conjunctival pallor. EOMI, PERRLA. MMM.   NECK: supple w FROM.  CARDIO: RRR, nl S1 and S2. No murmurs, rubs, or gallops. 2+ radial and pedal pulses bilaterally.  PULM: Normal effort. CTA b/l without wheezes, rales, or rhonchi.  MSK: FROM to extremities x4.  GI: Normal bowel sounds. Abdomen soft and non-distended. Nontender in all four quadrants without rebound or guarding. No pulsatile masses. Rectal exam (chaperone Dr. Montes) - no hemorrhoids or fissures. No BRBPR or melena.  : No CVA tenderness b/l.  SKIN: Warm, dry. No pallor. No rash. No jaundice.   NEURO: A&Ox3. Speech clear. No focal deficits.  PSYCH: Calm and cooperative.

## 2020-10-30 NOTE — ED ADULT NURSE NOTE - PMH
CHF (congestive heart failure)    Coronary artery disease    High cholesterol    HTN (hypertension)

## 2020-10-30 NOTE — ED PROVIDER NOTE - OBJECTIVE STATEMENT
87 year old female w hx of percutaneous cholecystectomy in June 2020 (Ferzli), CAD, CHF, HTN, not on AC/AP presents to the ED for evaluation of intermittent, bright red rectal bleeding last night. Patient noted bright red blood when wiping, stool was dark brown this morning. Denies abdominal pain. Denies fevers/chills, chest pain, sob, n/v/d, constipation, obstipation, back/flank pain, irritative voiding symptoms, melena, recent travel/sick contacts/antibiotics.

## 2020-10-30 NOTE — ED PROVIDER NOTE - INTERPRETATION SERVICES DECLINED
daughter at bedside, translating; refuses to use the  phone at this time/Patient/Caregiver requests family/friend to interpret.

## 2020-10-30 NOTE — ED PROVIDER NOTE - CARE PROVIDER_API CALL
Melodie Garcia)  Gastroenterology; Internal Medicine  [address] [place] [address]  Phone: [phone]  Fax: [phone]  Follow Up Time: 1-3 Days    Kevyn Jacobsen S  SURGERY  [address] [place] [id number]  Phone: [phone]  Fax: [phone]  Follow Up Time: 1-3 Days

## 2020-11-14 ENCOUNTER — INPATIENT (INPATIENT)
Facility: HOSPITAL | Age: 85
LOS: 1 days | Discharge: HOME HEALTH | End: 2020-11-16
Attending: SPECIALIST | Admitting: SPECIALIST
Payer: MEDICARE

## 2020-11-14 ENCOUNTER — RESULT REVIEW (OUTPATIENT)
Age: 85
End: 2020-11-14

## 2020-11-14 VITALS
OXYGEN SATURATION: 96 % | RESPIRATION RATE: 18 BRPM | HEIGHT: 57 IN | HEART RATE: 105 BPM | SYSTOLIC BLOOD PRESSURE: 192 MMHG | TEMPERATURE: 99 F | DIASTOLIC BLOOD PRESSURE: 70 MMHG

## 2020-11-14 DIAGNOSIS — Z43.4 ENCOUNTER FOR ATTENTION TO OTHER ARTIFICIAL OPENINGS OF DIGESTIVE TRACT: Chronic | ICD-10-CM

## 2020-11-14 DIAGNOSIS — Z95.1 PRESENCE OF AORTOCORONARY BYPASS GRAFT: Chronic | ICD-10-CM

## 2020-11-14 LAB
ALBUMIN SERPL ELPH-MCNC: 3.6 G/DL — SIGNIFICANT CHANGE UP (ref 3.5–5.2)
ALP SERPL-CCNC: 64 U/L — SIGNIFICANT CHANGE UP (ref 30–115)
ALT FLD-CCNC: 8 U/L — SIGNIFICANT CHANGE UP (ref 0–41)
ANION GAP SERPL CALC-SCNC: 9 MMOL/L — SIGNIFICANT CHANGE UP (ref 7–14)
APPEARANCE UR: CLEAR — SIGNIFICANT CHANGE UP
APTT BLD: 30.9 SEC — SIGNIFICANT CHANGE UP (ref 27–39.2)
AST SERPL-CCNC: 16 U/L — SIGNIFICANT CHANGE UP (ref 0–41)
BACTERIA # UR AUTO: ABNORMAL
BASOPHILS # BLD AUTO: 0 K/UL — SIGNIFICANT CHANGE UP (ref 0–0.2)
BASOPHILS NFR BLD AUTO: 0 % — SIGNIFICANT CHANGE UP (ref 0–1)
BILIRUB SERPL-MCNC: 0.8 MG/DL — SIGNIFICANT CHANGE UP (ref 0.2–1.2)
BILIRUB UR-MCNC: NEGATIVE — SIGNIFICANT CHANGE UP
BLD GP AB SCN SERPL QL: SIGNIFICANT CHANGE UP
BUN SERPL-MCNC: 10 MG/DL — SIGNIFICANT CHANGE UP (ref 10–20)
CALCIUM SERPL-MCNC: 9.8 MG/DL — SIGNIFICANT CHANGE UP (ref 8.5–10.1)
CHLORIDE SERPL-SCNC: 105 MMOL/L — SIGNIFICANT CHANGE UP (ref 98–110)
CO2 SERPL-SCNC: 27 MMOL/L — SIGNIFICANT CHANGE UP (ref 17–32)
COLOR SPEC: YELLOW — SIGNIFICANT CHANGE UP
CREAT SERPL-MCNC: 0.6 MG/DL — LOW (ref 0.7–1.5)
DIFF PNL FLD: NEGATIVE — SIGNIFICANT CHANGE UP
EOSINOPHIL # BLD AUTO: 0 K/UL — SIGNIFICANT CHANGE UP (ref 0–0.7)
EOSINOPHIL NFR BLD AUTO: 0 % — SIGNIFICANT CHANGE UP (ref 0–8)
EPI CELLS # UR: 3 /HPF — SIGNIFICANT CHANGE UP (ref 0–5)
GIANT PLATELETS BLD QL SMEAR: PRESENT — SIGNIFICANT CHANGE UP
GLUCOSE SERPL-MCNC: 111 MG/DL — HIGH (ref 70–99)
GLUCOSE UR QL: NEGATIVE — SIGNIFICANT CHANGE UP
HCT VFR BLD CALC: 33.1 % — LOW (ref 37–47)
HGB BLD-MCNC: 10.2 G/DL — LOW (ref 12–16)
HYALINE CASTS # UR AUTO: 1 /LPF — SIGNIFICANT CHANGE UP (ref 0–7)
INR BLD: 1.23 RATIO — SIGNIFICANT CHANGE UP (ref 0.65–1.3)
KETONES UR-MCNC: SIGNIFICANT CHANGE UP
LACTATE SERPL-SCNC: 1.1 MMOL/L — SIGNIFICANT CHANGE UP (ref 0.7–2)
LEUKOCYTE ESTERASE UR-ACNC: NEGATIVE — SIGNIFICANT CHANGE UP
LIDOCAIN IGE QN: 10 U/L — SIGNIFICANT CHANGE UP (ref 7–60)
LYMPHOCYTES # BLD AUTO: 1.13 K/UL — LOW (ref 1.2–3.4)
LYMPHOCYTES # BLD AUTO: 7.9 % — LOW (ref 20.5–51.1)
MAGNESIUM SERPL-MCNC: 2 MG/DL — SIGNIFICANT CHANGE UP (ref 1.8–2.4)
MANUAL SMEAR VERIFICATION: SIGNIFICANT CHANGE UP
MCHC RBC-ENTMCNC: 27.3 PG — SIGNIFICANT CHANGE UP (ref 27–31)
MCHC RBC-ENTMCNC: 30.8 G/DL — LOW (ref 32–37)
MCV RBC AUTO: 88.5 FL — SIGNIFICANT CHANGE UP (ref 81–99)
MONOCYTES # BLD AUTO: 1 K/UL — HIGH (ref 0.1–0.6)
MONOCYTES NFR BLD AUTO: 7 % — SIGNIFICANT CHANGE UP (ref 1.7–9.3)
NEUTROPHILS # BLD AUTO: 11.76 K/UL — HIGH (ref 1.4–6.5)
NEUTROPHILS NFR BLD AUTO: 82.5 % — HIGH (ref 42.2–75.2)
NITRITE UR-MCNC: POSITIVE
NRBC # BLD: 1 /100 — HIGH (ref 0–0)
NRBC # BLD: SIGNIFICANT CHANGE UP /100 WBCS (ref 0–0)
PH UR: 7 — SIGNIFICANT CHANGE UP (ref 5–8)
PLAT MORPH BLD: NORMAL — SIGNIFICANT CHANGE UP
PLATELET # BLD AUTO: 210 K/UL — SIGNIFICANT CHANGE UP (ref 130–400)
POTASSIUM SERPL-MCNC: 4.1 MMOL/L — SIGNIFICANT CHANGE UP (ref 3.5–5)
POTASSIUM SERPL-SCNC: 4.1 MMOL/L — SIGNIFICANT CHANGE UP (ref 3.5–5)
PROT SERPL-MCNC: 6.3 G/DL — SIGNIFICANT CHANGE UP (ref 6–8)
PROT UR-MCNC: ABNORMAL
PROTHROM AB SERPL-ACNC: 14.1 SEC — HIGH (ref 9.95–12.87)
RAPID RVP RESULT: SIGNIFICANT CHANGE UP
RBC # BLD: 3.74 M/UL — LOW (ref 4.2–5.4)
RBC # FLD: 16.7 % — HIGH (ref 11.5–14.5)
RBC BLD AUTO: NORMAL — SIGNIFICANT CHANGE UP
RBC CASTS # UR COMP ASSIST: 3 /HPF — SIGNIFICANT CHANGE UP (ref 0–4)
SARS-COV-2 RNA SPEC QL NAA+PROBE: SIGNIFICANT CHANGE UP
SODIUM SERPL-SCNC: 141 MMOL/L — SIGNIFICANT CHANGE UP (ref 135–146)
SP GR SPEC: >1.05 (ref 1.01–1.03)
UROBILINOGEN FLD QL: SIGNIFICANT CHANGE UP
VARIANT LYMPHS # BLD: 2.6 % — SIGNIFICANT CHANGE UP (ref 0–5)
WBC # BLD: 14.26 K/UL — HIGH (ref 4.8–10.8)
WBC # FLD AUTO: 14.26 K/UL — HIGH (ref 4.8–10.8)
WBC UR QL: 5 /HPF — SIGNIFICANT CHANGE UP (ref 0–5)

## 2020-11-14 PROCEDURE — 76705 ECHO EXAM OF ABDOMEN: CPT | Mod: 26

## 2020-11-14 PROCEDURE — 93010 ELECTROCARDIOGRAM REPORT: CPT

## 2020-11-14 PROCEDURE — 88304 TISSUE EXAM BY PATHOLOGIST: CPT | Mod: 26

## 2020-11-14 PROCEDURE — 74177 CT ABD & PELVIS W/CONTRAST: CPT | Mod: 26

## 2020-11-14 PROCEDURE — 99285 EMERGENCY DEPT VISIT HI MDM: CPT

## 2020-11-14 RX ORDER — ACETAMINOPHEN 500 MG
650 TABLET ORAL EVERY 6 HOURS
Refills: 0 | Status: DISCONTINUED | OUTPATIENT
Start: 2020-11-14 | End: 2020-11-16

## 2020-11-14 RX ORDER — MORPHINE SULFATE 50 MG/1
4 CAPSULE, EXTENDED RELEASE ORAL
Refills: 0 | Status: DISCONTINUED | OUTPATIENT
Start: 2020-11-14 | End: 2020-11-14

## 2020-11-14 RX ORDER — PANTOPRAZOLE SODIUM 20 MG/1
40 TABLET, DELAYED RELEASE ORAL
Refills: 0 | Status: DISCONTINUED | OUTPATIENT
Start: 2020-11-14 | End: 2020-11-16

## 2020-11-14 RX ORDER — METRONIDAZOLE 500 MG
500 TABLET ORAL ONCE
Refills: 0 | Status: DISCONTINUED | OUTPATIENT
Start: 2020-11-14 | End: 2020-11-14

## 2020-11-14 RX ORDER — IBUPROFEN 200 MG
400 TABLET ORAL EVERY 8 HOURS
Refills: 0 | Status: DISCONTINUED | OUTPATIENT
Start: 2020-11-14 | End: 2020-11-16

## 2020-11-14 RX ORDER — CIPROFLOXACIN LACTATE 400MG/40ML
400 VIAL (ML) INTRAVENOUS ONCE
Refills: 0 | Status: DISCONTINUED | OUTPATIENT
Start: 2020-11-14 | End: 2020-11-14

## 2020-11-14 RX ORDER — MEPERIDINE HYDROCHLORIDE 50 MG/ML
12.5 INJECTION INTRAMUSCULAR; INTRAVENOUS; SUBCUTANEOUS
Refills: 0 | Status: DISCONTINUED | OUTPATIENT
Start: 2020-11-14 | End: 2020-11-14

## 2020-11-14 RX ORDER — HEPARIN SODIUM 5000 [USP'U]/ML
5000 INJECTION INTRAVENOUS; SUBCUTANEOUS EVERY 8 HOURS
Refills: 0 | Status: DISCONTINUED | OUTPATIENT
Start: 2020-11-14 | End: 2020-11-16

## 2020-11-14 RX ORDER — MEROPENEM 1 G/30ML
1000 INJECTION INTRAVENOUS EVERY 8 HOURS
Refills: 0 | Status: DISCONTINUED | OUTPATIENT
Start: 2020-11-14 | End: 2020-11-16

## 2020-11-14 RX ORDER — MORPHINE SULFATE 50 MG/1
2 CAPSULE, EXTENDED RELEASE ORAL ONCE
Refills: 0 | Status: DISCONTINUED | OUTPATIENT
Start: 2020-11-14 | End: 2020-11-14

## 2020-11-14 RX ORDER — ONDANSETRON 8 MG/1
4 TABLET, FILM COATED ORAL ONCE
Refills: 0 | Status: COMPLETED | OUTPATIENT
Start: 2020-11-14 | End: 2020-11-14

## 2020-11-14 RX ORDER — SODIUM CHLORIDE 9 MG/ML
1000 INJECTION, SOLUTION INTRAVENOUS
Refills: 0 | Status: DISCONTINUED | OUTPATIENT
Start: 2020-11-14 | End: 2020-11-14

## 2020-11-14 RX ORDER — ALENDRONATE SODIUM 70 MG/1
1 TABLET ORAL
Qty: 0 | Refills: 0 | DISCHARGE

## 2020-11-14 RX ORDER — SODIUM CHLORIDE 9 MG/ML
1000 INJECTION INTRAMUSCULAR; INTRAVENOUS; SUBCUTANEOUS ONCE
Refills: 0 | Status: COMPLETED | OUTPATIENT
Start: 2020-11-14 | End: 2020-11-14

## 2020-11-14 RX ORDER — OXYCODONE HYDROCHLORIDE 5 MG/1
5 TABLET ORAL EVERY 4 HOURS
Refills: 0 | Status: DISCONTINUED | OUTPATIENT
Start: 2020-11-14 | End: 2020-11-16

## 2020-11-14 RX ORDER — IPRATROPIUM BROMIDE 0.2 MG/ML
1 SOLUTION, NON-ORAL INHALATION EVERY 6 HOURS
Refills: 0 | Status: DISCONTINUED | OUTPATIENT
Start: 2020-11-14 | End: 2020-11-16

## 2020-11-14 RX ORDER — MEROPENEM 1 G/30ML
1000 INJECTION INTRAVENOUS ONCE
Refills: 0 | Status: DISCONTINUED | OUTPATIENT
Start: 2020-11-14 | End: 2020-11-14

## 2020-11-14 RX ORDER — ONDANSETRON 8 MG/1
4 TABLET, FILM COATED ORAL ONCE
Refills: 0 | Status: DISCONTINUED | OUTPATIENT
Start: 2020-11-14 | End: 2020-11-14

## 2020-11-14 RX ORDER — SODIUM CHLORIDE 9 MG/ML
1000 INJECTION, SOLUTION INTRAVENOUS
Refills: 0 | Status: DISCONTINUED | OUTPATIENT
Start: 2020-11-14 | End: 2020-11-16

## 2020-11-14 RX ORDER — FOLIC ACID 0.8 MG
1 TABLET ORAL
Qty: 0 | Refills: 0 | DISCHARGE

## 2020-11-14 RX ADMIN — MEROPENEM 100 MILLIGRAM(S): 1 INJECTION INTRAVENOUS at 22:06

## 2020-11-14 RX ADMIN — ONDANSETRON 4 MILLIGRAM(S): 8 TABLET, FILM COATED ORAL at 13:08

## 2020-11-14 RX ADMIN — MORPHINE SULFATE 4 MILLIGRAM(S): 50 CAPSULE, EXTENDED RELEASE ORAL at 21:43

## 2020-11-14 RX ADMIN — MORPHINE SULFATE 4 MILLIGRAM(S): 50 CAPSULE, EXTENDED RELEASE ORAL at 22:25

## 2020-11-14 RX ADMIN — SODIUM CHLORIDE 1000 MILLILITER(S): 9 INJECTION INTRAMUSCULAR; INTRAVENOUS; SUBCUTANEOUS at 14:25

## 2020-11-14 RX ADMIN — MORPHINE SULFATE 4 MILLIGRAM(S): 50 CAPSULE, EXTENDED RELEASE ORAL at 21:15

## 2020-11-14 RX ADMIN — MORPHINE SULFATE 2 MILLIGRAM(S): 50 CAPSULE, EXTENDED RELEASE ORAL at 12:32

## 2020-11-14 RX ADMIN — Medication 1 PUFF(S): at 22:06

## 2020-11-14 RX ADMIN — MORPHINE SULFATE 4 MILLIGRAM(S): 50 CAPSULE, EXTENDED RELEASE ORAL at 22:00

## 2020-11-14 RX ADMIN — MORPHINE SULFATE 2 MILLIGRAM(S): 50 CAPSULE, EXTENDED RELEASE ORAL at 12:45

## 2020-11-14 RX ADMIN — HEPARIN SODIUM 5000 UNIT(S): 5000 INJECTION INTRAVENOUS; SUBCUTANEOUS at 22:25

## 2020-11-14 RX ADMIN — SODIUM CHLORIDE 100 MILLILITER(S): 9 INJECTION, SOLUTION INTRAVENOUS at 21:42

## 2020-11-14 RX ADMIN — SODIUM CHLORIDE 75 MILLILITER(S): 9 INJECTION, SOLUTION INTRAVENOUS at 22:03

## 2020-11-14 NOTE — ED PROVIDER NOTE - OBJECTIVE STATEMENT
87y F pmh chf, cad, hld, htn. PSH perc tony 6/20 by Dr. Jacobsen presents for eval of RUQ pain. Pt has 2 days of severe RUQ pain with associated nbnb n/v, no aggravating or relieving factors. Similar episode with previous cholecystitis. Denies fever, ha, cp, sob, weakness, numbness, dysuria, heamturia

## 2020-11-14 NOTE — H&P ADULT - NSHPPHYSICALEXAM_GEN_ALL_CORE
ICU Vital Signs Last 24 Hrs  T(C): 37.7 (14 Nov 2020 16:26), Max: 37.7 (14 Nov 2020 16:26)  T(F): 99.8 (14 Nov 2020 16:26), Max: 99.8 (14 Nov 2020 16:26)  HR: 107 (14 Nov 2020 16:26) (105 - 107)  BP: 156/66 (14 Nov 2020 16:26) (156/66 - 192/70)  RR: 18 (14 Nov 2020 16:26) (18 - 18)  SpO2: 94% (14 Nov 2020 16:26) (94% - 96%)    GENERAL: This is a well-developed female, lying on stretcher in moderate acute distress secondary to pain.  CHEST/LUNG: Clear to auscultation bilaterally.  HEART: Regular rate and rhythm.  ABDOMEN: Soft, nondistended abdomen. Diffusely tender to palpation with significant tenderness to light palpation over the RUQ and epigastrum.  EXTREMITIES:  No clubbing, cyanosis, or edema.

## 2020-11-14 NOTE — ED PROVIDER NOTE - PHYSICAL EXAMINATION
CONST: NAD  EYES: Sclera and conjunctiva clear.   ENT: No nasal discharge. Oropharynx normal appearing  NECK: Non-tender, no meningeal signs. normal ROM. supple   CARD: S1 S2; No jvd  RESP: Equal BS B/L, No wheezes, rhonchi or rales. No distress  GI: RUQ tenderness. no cva tenderness. normal BS  MS: Normal ROM in all extremities. pulses 2 +. no calf tenderness or swelling  SKIN: Warm, dry, no acute rashes. Good turgor  NEURO: A&Ox3

## 2020-11-14 NOTE — H&P ADULT - NSICDXPASTMEDICALHX_GEN_ALL_CORE_FT
PAST MEDICAL HISTORY:  CHF (congestive heart failure)     Coronary artery disease     High cholesterol     HTN (hypertension)     Osteoporosis

## 2020-11-14 NOTE — ED ADULT NURSE NOTE - NSIMPLEMENTINTERV_GEN_ALL_ED
Implemented All Fall with Harm Risk Interventions:  [place] to call system. Call bell, personal items and telephone within reach. Instruct patient to call for assistance. Room bathroom lighting operational. Non-slip footwear when patient is off stretcher. Physically safe environment: no spills, clutter or unnecessary equipment. Stretcher in lowest position, wheels locked, appropriate side rails in place. Provide visual cue, wrist band, yellow gown, etc. Monitor gait and stability. Monitor for mental status changes and reorient to person, place, and time. Review medications for side effects contributing to fall risk. Reinforce activity limits and safety measures with patient and family. Provide visual clues: red socks.

## 2020-11-14 NOTE — ED PROVIDER NOTE - PROGRESS NOTE DETAILS
ATTENDING NOTE:   88 yo F PMH CAD, HLD, CHF, HTN and Hx of having a percutaneous drain of gallbladder in June by Dr. Jacobsen presents c/o RUQ pain, 9/10, non-radiating, associated with multiple episodes of vomiting. No fever, cough, CP, SOB or urinary sx.   Physical exam: CONSTITUTIONAL: WA / WN / NAD. HEAD: NCAT. EYES: PERRL; EOMI; anicteric. ENT: Normal pharynx; mucous membranes pink/moist, no erythema. NECK: Supple; no meningeal signs CARD: RRR; nl S1/S2; no M/R/G. Pulses equal bilaterally. RESP: Respiratory rate and effort are normal; breath sounds clear and equal bilaterally. ABD: Soft, ND nl bowel sounds; no masses; (+) RUQ epigastric tenderness, no rebound. MSK/EXT: No gross deformities; full range of motion. SKIN: Warm and dry;  NEURO: AAOx3, PSYCH: Memory Intact, Normal Affect ATTENDING NOTE:   88 yo F PMH CAD, HLD, CHF, HTN and Hx of having a percutaneous drain of gallbladder in June by Dr. Jacobsen presents c/o RUQ pain, 9/10, non-radiating, associated with multiple episodes of vomiting. No fever, cough, CP, SOB or urinary sx.   Physical exam: CONSTITUTIONAL: WA / WN / NAD. HEAD: NCAT. EYES: PERRL; EOMI; ENT: Normal pharynx; mucous membranes pink/moist, no erythema. NECK: Supple; no meningeal signs CARD: RRR; nl S1/S2; no M/R/G. Pulses equal bilaterally. RESP: Respiratory rate and effort are normal; breath sounds clear and equal bilaterally. ABD: Soft, ND  (+) RUQ epigastric tendernessMSK/EXT: No gross deformities; full range of motion. SKIN: Warm and dry;  NEURO: AAOx3, PSYCH: Memory Intact, Normal Affect ATTENDING NOTE:   88 yo F PMH CAD, HLD, CHF, HTN and Hx of having a percutaneous drain of gallbladder in June by Dr. Jacobsen presents c/o RUQ pain, 9/10, non-radiating sharp, associated with multiple episodes of vomiting. No fever, cough, CP, SOB or urinary sx.   Physical exam: CONSTITUTIONAL: WA / WN / NAD. HEAD: NCAT. EYES: PERRL; EOMI; ENT: Normal pharynx; mucous membranes pink/moist, no erythema. NECK: Supple; no meningeal signs CARD: RRR; nl S1/S2; no M/R/G. Pulses equal bilaterally. RESP: Respiratory rate and effort are normal; breath sounds clear and equal bilaterally. ABD: Soft, ND  (+) RUQ epigastric tendernessMSK/EXT: No gross deformities; full range of motion. SKIN: Warm and dry;  NEURO: AAOx3, PSYCH: Memory Intact, Normal Affect

## 2020-11-14 NOTE — H&P ADULT - HISTORY OF PRESENT ILLNESS
This is an 87 year old female with a PMH/PSH of HTN, HLD, CHF, CAD s/p CABG, osteoporosis, umbilical and right inguinal hernia repair, and acute cholecystitis s/p perc tony on 6/5/2020 and removal of tube on 9/22/2020. Patient is Yakut speaking, utilizing daughter at bedside as . Patient reports abdominal pain located in the RUQ and epigastrum x 2 days. States that pain has been worsening since onset and denies alleviating factors. Denies improvement of pain with morphine while in the ED. Endorses loss of appetite and states she was last able to tolerate soup last night. She also reports multiple episodes of nausea and vomiting what appears like "phlegm." Denies fever, chills, or changes in bowel movements or urination. This is an 87 year old female with a PMH/PSH of HTN, HLD, CHF, CAD s/p CABG, osteoporosis, umbilical and right inguinal hernia repair, and acute cholecystitis s/p perc tony on 6/5/2020 and removal of tube on 9/22/2020. Patient is Nepali speaking, utilizing daughter at bedside as , declining hospital-provided . Patient reports abdominal pain located in the RUQ and epigastrium x 2 days. States that pain has been worsening since onset and denies alleviating factors. Denies improvement of pain with morphine while in the ED. Endorses loss of appetite and states she was last able to tolerate soup last night. She also reports multiple episodes of nausea and vomiting what appears like "phlegm." Denies fever, chills, or changes in bowel movements or urination.

## 2020-11-14 NOTE — H&P ADULT - NSHPLABSRESULTS_GEN_ALL_CORE
LABS:             10.2   . )-----------( 210      (  @ 12:48 )             33.1                141   |  105   |  10                 Ca: 9.8    BMP:   ----------------------------< 111    M.0   (20 @ 12:48)             4.1    |  27    | 0.6                Ph: x        LFT:     TPro: 6.3 / Alb: 3.6 / TBili: 0.8 / DBili: x / AST: 16 / ALT: 8 / AlkPhos: 64   (20 @ 12:48)    Urinalysis Basic - ( 2020 12:05 )  Color: Yellow / Appearance: Clear / SG: >1.050 / pH: x  Gluc: x / Ketone: Trace  / Bili: Negative / Urobili: <2 mg/dL   Blood: x / Protein: 30 mg/dL / Nitrite: Positive   Leuk Esterase: Negative / RBC: 3 /HPF / WBC 5 /HPF   Sq Epi: x / Non Sq Epi: 3 /HPF / Bacteria: Many      RADIOLOGY:  < from: CT Abdomen and Pelvis w/ IV Cont (20 @ 16:11) >  IMPRESSION:  Edematous gallbladder with surrounding pericholecystic fluid in fat stranding. The gallbladder wall is edematous with apparent discontinuation, best seen on series 5, image 148. Further evaluation for bile leak can be assessed with nuclear medicine HIDA scan. There is significant pericholecystic inflammatory change and fluid which appears to be loculated and is concerning for early developing abscess. Cholelithiasis visualized within the gallbladder neck.    Significant wall thickening within the hepatic flexure, likely reactive.  < end of copied text >    < from: US Abdomen Limited (20 @ 14:08) >  IMPRESSION:  Cholelithiasis without convincing evidence for acute cholecystitis. Consider HIDA scan if there is high clinical suspicion.  < end of copied text >

## 2020-11-14 NOTE — H&P ADULT - ASSESSMENT
This is an 87 year old female with a PMH/PSH of HTN, HLD, CHF, CAD s/p CABG, osteoporosis, umbilical and right inguinal hernia repair, and acute cholecystitis s/p perc tony on 6/5/2020 and removal of tube on 9/22/2020 who presented to the ED with worsening RUQ pain x 2 days. Patient is afebrile and diffusely TTP worse over RUQ/epigastrum. Labs are significant for a WBC 14.26 and CT imaging revealed an edematous gallbladder with significant pericholecystic inflammatory change and loculated fluid concerning for early abscess.    PLAN:  -Admit to surgery under Dr. Jacobsen  -Booked and consented for laparoscopic cholecystectomy, possible open  -Pre-op labs  -NPO, IVFs  -IV abx  -Pain, nausea control This is an 87 year old female with a PMH/PSH of HTN, HLD, CHF, CAD s/p CABG, osteoporosis, umbilical and right inguinal hernia repair, and acute cholecystitis s/p perc tony on 6/5/2020 and removal of tube on 9/22/2020 who presented to the ED with worsening RUQ pain x 2 days. Patient is afebrile and diffusely TTP worse over RUQ/epigastrium. Labs are significant for a WBC 14.26 and CT imaging revealed an edematous gallbladder with significant pericholecystic inflammatory change and loculated fluid concerning for early abscess.    PLAN:  -Admit to surgery under Dr. Jacobsen  -Booked and consented for laparoscopic cholecystectomy, possible open  -Pre-op labs  -NPO, IVFs  -IV abx  -Pain, nausea control    Senior Resident Addendum  87F narrative as above, taken to OR for emergent lap cholecystectomy, risks and benefits discussed with patient and daughter in light of cardiac comorbidities, patient previously deemed moderate to high risk for intervention , risk of decompensation, cardiac event, worsening sepsis, even death, both affirm desire to go to OR for laparoscopic cholecystectomy. Discussed w/ Dr. Jacobsen, patient, patient's daughter, ED, surgical team.

## 2020-11-14 NOTE — ED PROVIDER NOTE - CLINICAL SUMMARY MEDICAL DECISION MAKING FREE TEXT BOX
86 yo F PMH CAD, HLD, CHF, HTN and Hx of having a percutaneous drain of gallbladder in June by Dr. Jacobsen presents c/o RUQ pain, 9/10, non-radiating sharp, associated with multiple episodes of vomiting. No fever, cough, CP, SOB or urinary sx.  Vs reviewed. Labs imaging ekg obtained and reviewed. Patient found to have acute cholecystitis. Iv antibiotics ordered. Surgery consulted and admitted to surgery.

## 2020-11-14 NOTE — ED PROVIDER NOTE - PMH
CHF (congestive heart failure)    Coronary artery disease    High cholesterol    HTN (hypertension)     CHF (congestive heart failure)    Coronary artery disease    High cholesterol    HTN (hypertension)    Osteoporosis

## 2020-11-15 LAB
ALBUMIN SERPL ELPH-MCNC: 2.9 G/DL — LOW (ref 3.5–5.2)
ALBUMIN SERPL ELPH-MCNC: 3.4 G/DL — LOW (ref 3.5–5.2)
ALP SERPL-CCNC: 57 U/L — SIGNIFICANT CHANGE UP (ref 30–115)
ALP SERPL-CCNC: 62 U/L — SIGNIFICANT CHANGE UP (ref 30–115)
ALT FLD-CCNC: 24 U/L — SIGNIFICANT CHANGE UP (ref 0–41)
ALT FLD-CCNC: 33 U/L — SIGNIFICANT CHANGE UP (ref 0–41)
ANION GAP SERPL CALC-SCNC: 10 MMOL/L — SIGNIFICANT CHANGE UP (ref 7–14)
ANION GAP SERPL CALC-SCNC: 10 MMOL/L — SIGNIFICANT CHANGE UP (ref 7–14)
ANION GAP SERPL CALC-SCNC: 12 MMOL/L — SIGNIFICANT CHANGE UP (ref 7–14)
AST SERPL-CCNC: 32 U/L — SIGNIFICANT CHANGE UP (ref 0–41)
AST SERPL-CCNC: 65 U/L — HIGH (ref 0–41)
BASOPHILS # BLD AUTO: 0.01 K/UL — SIGNIFICANT CHANGE UP (ref 0–0.2)
BASOPHILS # BLD AUTO: 0.01 K/UL — SIGNIFICANT CHANGE UP (ref 0–0.2)
BASOPHILS # BLD AUTO: 0.02 K/UL — SIGNIFICANT CHANGE UP (ref 0–0.2)
BASOPHILS NFR BLD AUTO: 0.1 % — SIGNIFICANT CHANGE UP (ref 0–1)
BASOPHILS NFR BLD AUTO: 0.1 % — SIGNIFICANT CHANGE UP (ref 0–1)
BASOPHILS NFR BLD AUTO: 0.2 % — SIGNIFICANT CHANGE UP (ref 0–1)
BILIRUB DIRECT SERPL-MCNC: 0.3 MG/DL — HIGH (ref 0–0.2)
BILIRUB DIRECT SERPL-MCNC: <0.2 MG/DL — SIGNIFICANT CHANGE UP (ref 0–0.2)
BILIRUB INDIRECT FLD-MCNC: 0.2 MG/DL — SIGNIFICANT CHANGE UP (ref 0.2–1.2)
BILIRUB INDIRECT FLD-MCNC: >0.1 MG/DL — LOW (ref 0.2–1.2)
BILIRUB SERPL-MCNC: 0.3 MG/DL — SIGNIFICANT CHANGE UP (ref 0.2–1.2)
BILIRUB SERPL-MCNC: 0.5 MG/DL — SIGNIFICANT CHANGE UP (ref 0.2–1.2)
BUN SERPL-MCNC: 12 MG/DL — SIGNIFICANT CHANGE UP (ref 10–20)
BUN SERPL-MCNC: 14 MG/DL — SIGNIFICANT CHANGE UP (ref 10–20)
BUN SERPL-MCNC: 22 MG/DL — HIGH (ref 10–20)
CALCIUM SERPL-MCNC: 8.7 MG/DL — SIGNIFICANT CHANGE UP (ref 8.5–10.1)
CHLORIDE SERPL-SCNC: 104 MMOL/L — SIGNIFICANT CHANGE UP (ref 98–110)
CHLORIDE SERPL-SCNC: 108 MMOL/L — SIGNIFICANT CHANGE UP (ref 98–110)
CHLORIDE SERPL-SCNC: 108 MMOL/L — SIGNIFICANT CHANGE UP (ref 98–110)
CO2 SERPL-SCNC: 23 MMOL/L — SIGNIFICANT CHANGE UP (ref 17–32)
CO2 SERPL-SCNC: 24 MMOL/L — SIGNIFICANT CHANGE UP (ref 17–32)
CO2 SERPL-SCNC: 25 MMOL/L — SIGNIFICANT CHANGE UP (ref 17–32)
CREAT SERPL-MCNC: 0.8 MG/DL — SIGNIFICANT CHANGE UP (ref 0.7–1.5)
CREAT SERPL-MCNC: 0.8 MG/DL — SIGNIFICANT CHANGE UP (ref 0.7–1.5)
CREAT SERPL-MCNC: 0.9 MG/DL — SIGNIFICANT CHANGE UP (ref 0.7–1.5)
EOSINOPHIL # BLD AUTO: 0 K/UL — SIGNIFICANT CHANGE UP (ref 0–0.7)
EOSINOPHIL NFR BLD AUTO: 0 % — SIGNIFICANT CHANGE UP (ref 0–8)
GLUCOSE SERPL-MCNC: 123 MG/DL — HIGH (ref 70–99)
GLUCOSE SERPL-MCNC: 128 MG/DL — HIGH (ref 70–99)
GLUCOSE SERPL-MCNC: 137 MG/DL — HIGH (ref 70–99)
HCT VFR BLD CALC: 29.7 % — LOW (ref 37–47)
HCT VFR BLD CALC: 33 % — LOW (ref 37–47)
HCT VFR BLD CALC: 33.4 % — LOW (ref 37–47)
HGB BLD-MCNC: 8.9 G/DL — LOW (ref 12–16)
HGB BLD-MCNC: 9.8 G/DL — LOW (ref 12–16)
HGB BLD-MCNC: 9.9 G/DL — LOW (ref 12–16)
IMM GRANULOCYTES NFR BLD AUTO: 0.5 % — HIGH (ref 0.1–0.3)
IMM GRANULOCYTES NFR BLD AUTO: 0.5 % — HIGH (ref 0.1–0.3)
IMM GRANULOCYTES NFR BLD AUTO: 0.6 % — HIGH (ref 0.1–0.3)
LYMPHOCYTES # BLD AUTO: 0.36 K/UL — LOW (ref 1.2–3.4)
LYMPHOCYTES # BLD AUTO: 0.73 K/UL — LOW (ref 1.2–3.4)
LYMPHOCYTES # BLD AUTO: 0.95 K/UL — LOW (ref 1.2–3.4)
LYMPHOCYTES # BLD AUTO: 2.8 % — LOW (ref 20.5–51.1)
LYMPHOCYTES # BLD AUTO: 4.8 % — LOW (ref 20.5–51.1)
LYMPHOCYTES # BLD AUTO: 7.6 % — LOW (ref 20.5–51.1)
MAGNESIUM SERPL-MCNC: 1.9 MG/DL — SIGNIFICANT CHANGE UP (ref 1.8–2.4)
MAGNESIUM SERPL-MCNC: 2 MG/DL — SIGNIFICANT CHANGE UP (ref 1.8–2.4)
MAGNESIUM SERPL-MCNC: 2 MG/DL — SIGNIFICANT CHANGE UP (ref 1.8–2.4)
MCHC RBC-ENTMCNC: 27.3 PG — SIGNIFICANT CHANGE UP (ref 27–31)
MCHC RBC-ENTMCNC: 27.3 PG — SIGNIFICANT CHANGE UP (ref 27–31)
MCHC RBC-ENTMCNC: 27.4 PG — SIGNIFICANT CHANGE UP (ref 27–31)
MCHC RBC-ENTMCNC: 29.3 G/DL — LOW (ref 32–37)
MCHC RBC-ENTMCNC: 30 G/DL — LOW (ref 32–37)
MCHC RBC-ENTMCNC: 30 G/DL — LOW (ref 32–37)
MCV RBC AUTO: 90.9 FL — SIGNIFICANT CHANGE UP (ref 81–99)
MCV RBC AUTO: 91.1 FL — SIGNIFICANT CHANGE UP (ref 81–99)
MCV RBC AUTO: 93.3 FL — SIGNIFICANT CHANGE UP (ref 81–99)
MONOCYTES # BLD AUTO: 1.42 K/UL — HIGH (ref 0.1–0.6)
MONOCYTES # BLD AUTO: 1.94 K/UL — HIGH (ref 0.1–0.6)
MONOCYTES # BLD AUTO: 2.01 K/UL — HIGH (ref 0.1–0.6)
MONOCYTES NFR BLD AUTO: 11.2 % — HIGH (ref 1.7–9.3)
MONOCYTES NFR BLD AUTO: 12.7 % — HIGH (ref 1.7–9.3)
MONOCYTES NFR BLD AUTO: 16 % — HIGH (ref 1.7–9.3)
NEUTROPHILS # BLD AUTO: 10.85 K/UL — HIGH (ref 1.4–6.5)
NEUTROPHILS # BLD AUTO: 12.46 K/UL — HIGH (ref 1.4–6.5)
NEUTROPHILS # BLD AUTO: 9.54 K/UL — HIGH (ref 1.4–6.5)
NEUTROPHILS NFR BLD AUTO: 75.7 % — HIGH (ref 42.2–75.2)
NEUTROPHILS NFR BLD AUTO: 81.9 % — HIGH (ref 42.2–75.2)
NEUTROPHILS NFR BLD AUTO: 85.3 % — HIGH (ref 42.2–75.2)
NRBC # BLD: 0 /100 WBCS — SIGNIFICANT CHANGE UP (ref 0–0)
PHOSPHATE SERPL-MCNC: 2.9 MG/DL — SIGNIFICANT CHANGE UP (ref 2.1–4.9)
PHOSPHATE SERPL-MCNC: 3.5 MG/DL — SIGNIFICANT CHANGE UP (ref 2.1–4.9)
PHOSPHATE SERPL-MCNC: 3.7 MG/DL — SIGNIFICANT CHANGE UP (ref 2.1–4.9)
PLATELET # BLD AUTO: 183 K/UL — SIGNIFICANT CHANGE UP (ref 130–400)
PLATELET # BLD AUTO: 194 K/UL — SIGNIFICANT CHANGE UP (ref 130–400)
PLATELET # BLD AUTO: 199 K/UL — SIGNIFICANT CHANGE UP (ref 130–400)
POTASSIUM SERPL-MCNC: 4.3 MMOL/L — SIGNIFICANT CHANGE UP (ref 3.5–5)
POTASSIUM SERPL-MCNC: 4.6 MMOL/L — SIGNIFICANT CHANGE UP (ref 3.5–5)
POTASSIUM SERPL-MCNC: 4.7 MMOL/L — SIGNIFICANT CHANGE UP (ref 3.5–5)
POTASSIUM SERPL-SCNC: 4.3 MMOL/L — SIGNIFICANT CHANGE UP (ref 3.5–5)
POTASSIUM SERPL-SCNC: 4.6 MMOL/L — SIGNIFICANT CHANGE UP (ref 3.5–5)
POTASSIUM SERPL-SCNC: 4.7 MMOL/L — SIGNIFICANT CHANGE UP (ref 3.5–5)
PROT SERPL-MCNC: 5.5 G/DL — LOW (ref 6–8)
PROT SERPL-MCNC: 5.9 G/DL — LOW (ref 6–8)
RBC # BLD: 3.26 M/UL — LOW (ref 4.2–5.4)
RBC # BLD: 3.58 M/UL — LOW (ref 4.2–5.4)
RBC # BLD: 3.63 M/UL — LOW (ref 4.2–5.4)
RBC # FLD: 16.6 % — HIGH (ref 11.5–14.5)
RBC # FLD: 16.8 % — HIGH (ref 11.5–14.5)
RBC # FLD: 16.8 % — HIGH (ref 11.5–14.5)
SODIUM SERPL-SCNC: 139 MMOL/L — SIGNIFICANT CHANGE UP (ref 135–146)
SODIUM SERPL-SCNC: 141 MMOL/L — SIGNIFICANT CHANGE UP (ref 135–146)
SODIUM SERPL-SCNC: 144 MMOL/L — SIGNIFICANT CHANGE UP (ref 135–146)
WBC # BLD: 12.58 K/UL — HIGH (ref 4.8–10.8)
WBC # BLD: 12.71 K/UL — HIGH (ref 4.8–10.8)
WBC # BLD: 15.22 K/UL — HIGH (ref 4.8–10.8)
WBC # FLD AUTO: 12.58 K/UL — HIGH (ref 4.8–10.8)
WBC # FLD AUTO: 12.71 K/UL — HIGH (ref 4.8–10.8)
WBC # FLD AUTO: 15.22 K/UL — HIGH (ref 4.8–10.8)

## 2020-11-15 RX ORDER — FERROUS SULFATE 325(65) MG
325 TABLET ORAL
Refills: 0 | Status: DISCONTINUED | OUTPATIENT
Start: 2020-11-15 | End: 2020-11-16

## 2020-11-15 RX ORDER — FUROSEMIDE 40 MG
1 TABLET ORAL
Qty: 0 | Refills: 0 | DISCHARGE

## 2020-11-15 RX ORDER — FERROUS SULFATE 325(65) MG
325 TABLET ORAL
Qty: 0 | Refills: 0 | DISCHARGE

## 2020-11-15 RX ORDER — ATORVASTATIN CALCIUM 80 MG/1
40 TABLET, FILM COATED ORAL AT BEDTIME
Refills: 0 | Status: DISCONTINUED | OUTPATIENT
Start: 2020-11-15 | End: 2020-11-16

## 2020-11-15 RX ORDER — SIMETHICONE 80 MG/1
80 TABLET, CHEWABLE ORAL DAILY
Refills: 0 | Status: DISCONTINUED | OUTPATIENT
Start: 2020-11-15 | End: 2020-11-16

## 2020-11-15 RX ORDER — FOLIC ACID 0.8 MG
1 TABLET ORAL DAILY
Refills: 0 | Status: DISCONTINUED | OUTPATIENT
Start: 2020-11-15 | End: 2020-11-16

## 2020-11-15 RX ORDER — CHOLECALCIFEROL (VITAMIN D3) 125 MCG
0 CAPSULE ORAL
Qty: 0 | Refills: 0 | DISCHARGE

## 2020-11-15 RX ORDER — FOLIC ACID 0.8 MG
1 TABLET ORAL
Qty: 0 | Refills: 0 | DISCHARGE

## 2020-11-15 RX ORDER — SENNA PLUS 8.6 MG/1
2 TABLET ORAL AT BEDTIME
Refills: 0 | Status: DISCONTINUED | OUTPATIENT
Start: 2020-11-15 | End: 2020-11-16

## 2020-11-15 RX ORDER — AMLODIPINE BESYLATE 2.5 MG/1
5 TABLET ORAL DAILY
Refills: 0 | Status: DISCONTINUED | OUTPATIENT
Start: 2020-11-15 | End: 2020-11-16

## 2020-11-15 RX ORDER — ATORVASTATIN CALCIUM 80 MG/1
1 TABLET, FILM COATED ORAL
Qty: 0 | Refills: 0 | DISCHARGE

## 2020-11-15 RX ORDER — ALENDRONATE SODIUM 70 MG/1
1 TABLET ORAL
Qty: 0 | Refills: 0 | DISCHARGE

## 2020-11-15 RX ORDER — ASPIRIN/CALCIUM CARB/MAGNESIUM 324 MG
81 TABLET ORAL DAILY
Refills: 0 | Status: DISCONTINUED | OUTPATIENT
Start: 2020-11-15 | End: 2020-11-16

## 2020-11-15 RX ORDER — METOPROLOL TARTRATE 50 MG
25 TABLET ORAL DAILY
Refills: 0 | Status: DISCONTINUED | OUTPATIENT
Start: 2020-11-15 | End: 2020-11-16

## 2020-11-15 RX ORDER — FUROSEMIDE 40 MG
40 TABLET ORAL EVERY 12 HOURS
Refills: 0 | Status: DISCONTINUED | OUTPATIENT
Start: 2020-11-15 | End: 2020-11-16

## 2020-11-15 RX ORDER — FUROSEMIDE 40 MG
40 TABLET ORAL EVERY 12 HOURS
Refills: 0 | Status: DISCONTINUED | OUTPATIENT
Start: 2020-11-15 | End: 2020-11-15

## 2020-11-15 RX ORDER — FUROSEMIDE 40 MG
40 TABLET ORAL DAILY
Refills: 0 | Status: DISCONTINUED | OUTPATIENT
Start: 2020-11-15 | End: 2020-11-15

## 2020-11-15 RX ORDER — AMLODIPINE BESYLATE 2.5 MG/1
1 TABLET ORAL
Qty: 0 | Refills: 0 | DISCHARGE

## 2020-11-15 RX ORDER — METOPROLOL TARTRATE 50 MG
1 TABLET ORAL
Qty: 0 | Refills: 0 | DISCHARGE

## 2020-11-15 RX ADMIN — MEROPENEM 100 MILLIGRAM(S): 1 INJECTION INTRAVENOUS at 05:18

## 2020-11-15 RX ADMIN — Medication 650 MILLIGRAM(S): at 09:27

## 2020-11-15 RX ADMIN — Medication 40 MILLIGRAM(S): at 05:19

## 2020-11-15 RX ADMIN — PANTOPRAZOLE SODIUM 40 MILLIGRAM(S): 20 TABLET, DELAYED RELEASE ORAL at 05:19

## 2020-11-15 RX ADMIN — MEROPENEM 100 MILLIGRAM(S): 1 INJECTION INTRAVENOUS at 18:33

## 2020-11-15 RX ADMIN — Medication 25 MILLIGRAM(S): at 05:19

## 2020-11-15 RX ADMIN — SODIUM CHLORIDE 75 MILLILITER(S): 9 INJECTION, SOLUTION INTRAVENOUS at 05:18

## 2020-11-15 RX ADMIN — ATORVASTATIN CALCIUM 40 MILLIGRAM(S): 80 TABLET, FILM COATED ORAL at 21:48

## 2020-11-15 RX ADMIN — SODIUM CHLORIDE 75 MILLILITER(S): 9 INJECTION, SOLUTION INTRAVENOUS at 21:48

## 2020-11-15 RX ADMIN — MEROPENEM 100 MILLIGRAM(S): 1 INJECTION INTRAVENOUS at 21:48

## 2020-11-15 RX ADMIN — Medication 40 MILLIGRAM(S): at 18:34

## 2020-11-15 RX ADMIN — HEPARIN SODIUM 5000 UNIT(S): 5000 INJECTION INTRAVENOUS; SUBCUTANEOUS at 21:48

## 2020-11-15 RX ADMIN — HEPARIN SODIUM 5000 UNIT(S): 5000 INJECTION INTRAVENOUS; SUBCUTANEOUS at 18:34

## 2020-11-15 RX ADMIN — HEPARIN SODIUM 5000 UNIT(S): 5000 INJECTION INTRAVENOUS; SUBCUTANEOUS at 05:19

## 2020-11-15 RX ADMIN — AMLODIPINE BESYLATE 5 MILLIGRAM(S): 2.5 TABLET ORAL at 05:19

## 2020-11-15 RX ADMIN — SENNA PLUS 2 TABLET(S): 8.6 TABLET ORAL at 21:48

## 2020-11-15 NOTE — PROGRESS NOTE ADULT - SUBJECTIVE AND OBJECTIVE BOX
Progress Note: General Surgery  Patient: CHINA BAÑUELOS , 87y (06-Dec-1932)Female   MRN: [id number]  Location: [address]  Visit: 11-14-20 Inpatient  Date: 11-15-20 @ 03:42    Procedure/Diagnosis: S/P Laparoscopic partial cholecystectomy    Events/ 24h: No acute events overnight. Pain controlled. follow up urination, has not voided post op    Vitals: T(F): 99.4 (11-14-20 @ 23:15), Max: 99.8 (11-14-20 @ 16:26)  HR: 97 (11-14-20 @ 23:15)  BP: 116/56 (11-14-20 @ 23:15) (116/56 - 192/70)  RR: 18 (11-14-20 @ 23:15)  SpO2: 95% (11-14-20 @ 23:15)    In:   11-14-20 @ 07:01  -  11-15-20 @ 03:42  --------------------------------------------------------  IN: 175 mL      Out:   11-14-20 @ 07:01  -  11-15-20 @ 03:42  --------------------------------------------------------  OUT:    Bulb (mL): 25 mL    Bulb (mL): 25 mL  Total OUT: 50 mL        Net:   11-14-20 @ 07:01  -  11-15-20 @ 03:42  --------------------------------------------------------  NET: 125 mL        Diet: Diet, NPO:   Except Medications (11-14-20 @ 22:08)    IV Fluids: lactated ringers. 1000 milliLiter(s) (75 mL/Hr) IV Continuous <Continuous>    Physical Examination:  General Appearance: NAD  HEENT: EOMI, sclera non-icteric.  Heart: RRR  Lungs: CTABL  Abdomen:  Soft, mildly tender, nondistended. No rigidity, guarding, or rebound tenderness. MAURISIO drains x 2 right side functioning   MSK/Extremities: Warm & well-perfused. Peripheral pulses intact.  Skin: Warm, dry. No jaundice.   Incisions/Wounds: Dressings in place, clean, dry and intact, no signs of infection/active bleeding/drainage      Medications: [Standing]  amLODIPine   Tablet 5 milliGRAM(s) Oral daily  atorvastatin 40 milliGRAM(s) Oral at bedtime  furosemide    Tablet 40 milliGRAM(s) Oral daily  heparin   Injectable 5000 Unit(s) SubCutaneous every 8 hours  ipratropium 17 MICROgram(s) HFA Inhaler 1 Puff(s) Inhalation every 6 hours  lactated ringers. 1000 milliLiter(s) (75 mL/Hr) IV Continuous <Continuous>  meropenem  IVPB 1000 milliGRAM(s) IV Intermittent every 8 hours  metolazone 2.5 milliGRAM(s) Oral daily  metoprolol succinate ER 25 milliGRAM(s) Oral daily  pantoprazole    Tablet 40 milliGRAM(s) Oral before breakfast    DVT Prophylaxis: heparin   Injectable 5000 Unit(s) SubCutaneous every 8 hours    GI Prophylaxis: pantoprazole    Tablet 40 milliGRAM(s) Oral before breakfast    Antibiotics: meropenem  IVPB 1000 milliGRAM(s) IV Intermittent every 8 hours    Anticoagulation:   Medications:[PRN]  acetaminophen   Tablet .. 650 milliGRAM(s) Oral every 6 hours PRN  ibuprofen  Tablet. 400 milliGRAM(s) Oral every 8 hours PRN  oxyCODONE    IR 5 milliGRAM(s) Oral every 4 hours PRN      Labs:                        9.9    12.71 )-----------( 199      ( 15 Nov 2020 00:23 )             33.0     11-15    141  |  108  |  12  ----------------------------<  137<H>  4.3   |  23  |  0.8    Ca    8.7      15 Nov 2020 00:23  Phos  3.5     11-15  Mg     1.9     11-15    TPro  5.9<L>  /  Alb  3.4<L>  /  TBili  0.5  /  DBili  0.3<H>  /  AST  65<H>  /  ALT  33  /  AlkPhos  62  11-15    LIVER FUNCTIONS - ( 15 Nov 2020 00:23 )  Alb: 3.4 g/dL / Pro: 5.9 g/dL / ALK PHOS: 62 U/L / ALT: 33 U/L / AST: 65 U/L / GGT: x           PT/INR - ( 14 Nov 2020 19:07 )   PT: 14.10 sec;   INR: 1.23 ratio         PTT - ( 14 Nov 2020 19:07 )  PTT:30.9 sec        Urine/Micro:    Urinalysis Basic - ( 14 Nov 2020 12:05 )    Color: Yellow / Appearance: Clear / SG: >1.050 / pH: x  Gluc: x / Ketone: Trace  / Bili: Negative / Urobili: <2 mg/dL   Blood: x / Protein: 30 mg/dL / Nitrite: Positive   Leuk Esterase: Negative / RBC: 3 /HPF / WBC 5 /HPF   Sq Epi: x / Non Sq Epi: 3 /HPF / Bacteria: Many        Imaging: < from: CT Abdomen and Pelvis w/ IV Cont (11.14.20 @ 16:11) >  IMPRESSION:  Edematous gallbladder with surrounding pericholecystic fluid in fat stranding. The gallbladder wall is edematous with apparent discontinuation, best seen on series 5, image 148. Further evaluation for bile leak can be assessed with nuclear medicine HIDA scan. There is significant pericholecystic inflammatory change and fluid which appears to be loculated and is concerning for early developing abscess. Cholelithiasis visualized within the gallbladder neck.    < end of copied text >          Date/Time: 11-15-20 @ 03:42

## 2020-11-15 NOTE — PROGRESS NOTE ADULT - ASSESSMENT
Assessment:  87yFemale patient S/P Laparoscopic cholecystectomy     Plan:    Follow up TOV  Advance diet 11/15/2020 to [facility]  Monitor vitals  Monitor MAURISIO drains output  Pain control as needed

## 2020-11-15 NOTE — CHART NOTE - NSCHARTNOTEFT_GEN_A_CORE
Post Operative Note  Patient: CHINA BAÑUELOSy (06-Dec-1932) Female   MRN: [id number]  Location: [address]  Visit: 11-14-20 Inpatient  Date: 11-15-20 @ 03:30    Procedure: S/P Laparoscopic partial cholecystectomy    Subjective:   Nausea: no, Vomiting: no, Ambulating: yes, Flatus: no  Pain Assessment: yes, Location: abdominal wound sites , Pain Intensity: 3/10 , Quality: Aching,  Sore    Objective:  Vitals: T(F): 99.4 (11-14-20 @ 23:15), Max: 99.8 (11-14-20 @ 16:26)  HR: 97 (11-14-20 @ 23:15)  BP: 116/56 (11-14-20 @ 23:15) (116/56 - 192/70)  RR: 18 (11-14-20 @ 23:15)  SpO2: 95% (11-14-20 @ 23:15)  Vent Settings:     In:   11-14-20 @ 07:01  -  11-15-20 @ 03:30  --------------------------------------------------------  IN: 175 mL      IV Fluids: lactated ringers. 1000 milliLiter(s) (75 mL/Hr) IV Continuous <Continuous>      Out:   11-14-20 @ 07:01  -  11-15-20 @ 03:30  --------------------------------------------------------  OUT: 50 mL      EBL:     Voided Urine:   11-14-20 @ 07:01  -  11-15-20 @ 03:30  --------------------------------------------------------  OUT: 50 mL      Lee Catheter: yes no   Drains:   MAURISIO:   11-14-20 @ 07:01  -  11-15-20 @ 03:30  --------------------------------------------------------  OUT: 50 mL     ,   Chest Tube:      NG Tube:       Physical Examination:  General Appearance: NAD  HEENT: EOMI, sclera non-icteric.  Heart: RRR  Lungs: CTABL  Abdomen:  Soft, mildly tender, nondistended. No rigidity, guarding, or rebound tenderness. MAURISIO drains x 2 right side functioning   MSK/Extremities: Warm & well-perfused. Peripheral pulses intact.  Skin: Warm, dry. No jaundice.   Incisions/Wounds: Dressings in place, clean, dry and intact, no signs of infection/active bleeding/drainage    Medications: [Standing]  acetaminophen   Tablet .. 650 milliGRAM(s) Oral every 6 hours PRN  amLODIPine   Tablet 5 milliGRAM(s) Oral daily  atorvastatin 40 milliGRAM(s) Oral at bedtime  furosemide    Tablet 40 milliGRAM(s) Oral daily  heparin   Injectable 5000 Unit(s) SubCutaneous every 8 hours  ibuprofen  Tablet. 400 milliGRAM(s) Oral every 8 hours PRN  ipratropium 17 MICROgram(s) HFA Inhaler 1 Puff(s) Inhalation every 6 hours  lactated ringers. 1000 milliLiter(s) IV Continuous <Continuous>  meropenem  IVPB 1000 milliGRAM(s) IV Intermittent every 8 hours  metolazone 2.5 milliGRAM(s) Oral daily  metoprolol succinate ER 25 milliGRAM(s) Oral daily  oxyCODONE    IR 5 milliGRAM(s) Oral every 4 hours PRN  pantoprazole    Tablet 40 milliGRAM(s) Oral before breakfast    Medications: [PRN]  acetaminophen   Tablet .. 650 milliGRAM(s) Oral every 6 hours PRN  amLODIPine   Tablet 5 milliGRAM(s) Oral daily  atorvastatin 40 milliGRAM(s) Oral at bedtime  furosemide    Tablet 40 milliGRAM(s) Oral daily  heparin   Injectable 5000 Unit(s) SubCutaneous every 8 hours  ibuprofen  Tablet. 400 milliGRAM(s) Oral every 8 hours PRN  ipratropium 17 MICROgram(s) HFA Inhaler 1 Puff(s) Inhalation every 6 hours  lactated ringers. 1000 milliLiter(s) IV Continuous <Continuous>  meropenem  IVPB 1000 milliGRAM(s) IV Intermittent every 8 hours  metolazone 2.5 milliGRAM(s) Oral daily  metoprolol succinate ER 25 milliGRAM(s) Oral daily  oxyCODONE    IR 5 milliGRAM(s) Oral every 4 hours PRN  pantoprazole    Tablet 40 milliGRAM(s) Oral before breakfast    Labs:                        9.9    12.71 )-----------( 199      ( 15 Nov 2020 00:23 )             33.0     11-15    141  |  108  |  12  ----------------------------<  137<H>  4.3   |  23  |  0.8    Ca    8.7      15 Nov 2020 00:23  Phos  3.5     11-15  Mg     1.9     11-15    TPro  5.9<L>  /  Alb  3.4<L>  /  TBili  0.5  /  DBili  0.3<H>  /  AST  65<H>  /  ALT  33  /  AlkPhos  62  11-15    PT/INR - ( 14 Nov 2020 19:07 )   PT: 14.10 sec;   INR: 1.23 ratio         PTT - ( 14 Nov 2020 19:07 )  PTT:30.9 sec      Imaging:  No post-op imaging studies    Assessment:  87yFemale patient S/P Laparoscopic cholecystectomy     Plan:    Follow up TOV  Advance diet 11/15/2020 to CLD  Monitor vitals  Monitor MAURISIO drains output  Pain control as needed  Date/Time: 11-15-20 @ 03:30

## 2020-11-15 NOTE — PROVIDER CONTACT NOTE (OTHER) - ASSESSMENT
patient states she does not feel the urge to urinate. Patient requesting to drink some water, but patient is NPO except medications.

## 2020-11-15 NOTE — PROVIDER CONTACT NOTE (OTHER) - SITUATION
patient was due to void after surgery at 0100. patient has not yet void, patient is sleeping and resting comfortably.

## 2020-11-16 ENCOUNTER — TRANSCRIPTION ENCOUNTER (OUTPATIENT)
Age: 85
End: 2020-11-16

## 2020-11-16 VITALS — OXYGEN SATURATION: 93 %

## 2020-11-16 RX ORDER — CIPROFLOXACIN LACTATE 400MG/40ML
1 VIAL (ML) INTRAVENOUS
Qty: 14 | Refills: 0
Start: 2020-11-16 | End: 2020-11-22

## 2020-11-16 RX ORDER — METRONIDAZOLE 500 MG
1 TABLET ORAL
Qty: 21 | Refills: 0
Start: 2020-11-16 | End: 2020-11-22

## 2020-11-16 RX ORDER — SODIUM CHLORIDE 9 MG/ML
3 INJECTION INTRAMUSCULAR; INTRAVENOUS; SUBCUTANEOUS EVERY 8 HOURS
Refills: 0 | Status: DISCONTINUED | OUTPATIENT
Start: 2020-11-16 | End: 2020-11-16

## 2020-11-16 RX ORDER — ACETAMINOPHEN 500 MG
2 TABLET ORAL
Qty: 0 | Refills: 0 | DISCHARGE
Start: 2020-11-16

## 2020-11-16 RX ADMIN — Medication 25 MILLIGRAM(S): at 05:29

## 2020-11-16 RX ADMIN — AMLODIPINE BESYLATE 5 MILLIGRAM(S): 2.5 TABLET ORAL at 05:29

## 2020-11-16 RX ADMIN — MEROPENEM 100 MILLIGRAM(S): 1 INJECTION INTRAVENOUS at 05:28

## 2020-11-16 RX ADMIN — MEROPENEM 100 MILLIGRAM(S): 1 INJECTION INTRAVENOUS at 13:08

## 2020-11-16 RX ADMIN — HEPARIN SODIUM 5000 UNIT(S): 5000 INJECTION INTRAVENOUS; SUBCUTANEOUS at 13:08

## 2020-11-16 RX ADMIN — Medication 650 MILLIGRAM(S): at 08:48

## 2020-11-16 RX ADMIN — Medication 40 MILLIGRAM(S): at 05:29

## 2020-11-16 RX ADMIN — Medication 325 MILLIGRAM(S): at 08:45

## 2020-11-16 RX ADMIN — HEPARIN SODIUM 5000 UNIT(S): 5000 INJECTION INTRAVENOUS; SUBCUTANEOUS at 05:29

## 2020-11-16 RX ADMIN — SODIUM CHLORIDE 3 MILLILITER(S): 9 INJECTION INTRAMUSCULAR; INTRAVENOUS; SUBCUTANEOUS at 13:12

## 2020-11-16 RX ADMIN — SIMETHICONE 80 MILLIGRAM(S): 80 TABLET, CHEWABLE ORAL at 13:07

## 2020-11-16 RX ADMIN — Medication 81 MILLIGRAM(S): at 12:32

## 2020-11-16 RX ADMIN — PANTOPRAZOLE SODIUM 40 MILLIGRAM(S): 20 TABLET, DELAYED RELEASE ORAL at 05:29

## 2020-11-16 RX ADMIN — Medication 650 MILLIGRAM(S): at 09:30

## 2020-11-16 RX ADMIN — Medication 1 MILLIGRAM(S): at 12:32

## 2020-11-16 NOTE — DISCHARGE NOTE PROVIDER - NSDCCPTREATMENT_GEN_ALL_CORE_FT
PRINCIPAL PROCEDURE  Procedure: Laparoscopic partial cholecystectomy  Findings and Treatment: Fenestrated

## 2020-11-16 NOTE — DISCHARGE NOTE PROVIDER - HOSPITAL COURSE
Patient is an 87-y female with pmh of HTN, HLD, chf, cad s/p cabg, and acute cholecystitis s/p percutaneous cholecystostomy on 6/5/20 w tube removed on 9/22/20, who was admitted on 11/14 and scheduled for a lap tony. She underwent to partial cholecystectomy with 2 MAURISIO drain placement due to dense adhesions and phlegmon w purulent fluid. Following surgery, patient was moved to the floor for recovery.   In last 24 hours, patient has been stable, postoperative course has been uneventful, she is tolerating diet, voiding, passing gas, pain is well controlled, and she is ambulating. She has 2 drains with serosanguineous output. The patient started her home medications and is ready to be discharge to home with VNS for wound and drain care.

## 2020-11-16 NOTE — DISCHARGE NOTE NURSING/CASE MANAGEMENT/SOCIAL WORK - PATIENT PORTAL LINK FT
You can access the FollowMyHealth Patient Portal offered by [facility] by registering at the following website: [email or web address]. By joining [facility]’s FollowMyHealth portal, you will also be able to view your health information using other applications (apps) compatible with our system.

## 2020-11-16 NOTE — PROGRESS NOTE ADULT - SUBJECTIVE AND OBJECTIVE BOX
Progress Note: Surgery  Patient: CHINA BAÑUELOS , 87y (06-Dec-1932)Female   MRN: [id number]  Location: [address]  Visit: 11-14-20 Inpatient  Date: 11-16-20 @ 07:32    Procedure/Diagnosis: POD 2 s/p fenestrated laparoscopic cholecystectomy  Events over 24h: No acute event overnight. No new complaint. Pt is hemodynamically stable. CLD tolerating well. Denies nausea, vomiting, and/or abdominal pain.    Vitals: T(F): 99.1 (11-16-20 @ 04:33), Max: 99.4 (11-15-20 @ 16:52)  HR: 91 (11-16-20 @ 04:33)  BP: 131/60 (11-16-20 @ 04:33) (110/56 - 146/66)  RR: 18 (11-16-20 @ 04:33)  SpO2: --      Diet: Diet, Regular:   DASH/TLC Sodium & Cholesterol Restricted (11-16-20 @ 07:31)    IV Fluid: ferrous    sulfate 325 milliGRAM(s) Oral <User Schedule>  folic acid 1 milliGRAM(s) Oral daily  sodium chloride 0.9% lock flush 3 milliLiter(s) IV Push every 8 hours      In:   11-15-20 @ 07:01  -  11-16-20 @ 07:00  --------------------------------------------------------  IN: 1315 mL      Out:   11-15-20 @ 07:01  -  11-16-20 @ 07:00  --------------------------------------------------------  OUT:    Bulb (mL): 145 mL    Bulb (mL): 110 mL    Voided (mL): 950 mL  Total OUT: 1205 mL        Net:   11-15-20 @ 07:01  -  11-16-20 @ 07:00  --------------------------------------------------------  NET: 110 mL      Physical Examination:  General Appearance: NAD, alert and cooperative  Heart: S1 and S2. No murmurs. Rhythm is regular.  Lungs: Clear to auscultation BL without rales, rhonchi, wheezing, crackles or diminished breath sounds.  Abdomen: Soft, nondistended, taurus-incisional tenderness. No rigidity, guarding, or rebound tenderness.  Incisions/Wounds: Dressings in place, clean, dry and intact, no signs of infection/active bleeding/drainage    Medications: [Standing]  amLODIPine   Tablet 5 milliGRAM(s) Oral daily  aspirin enteric coated 81 milliGRAM(s) Oral daily  atorvastatin 40 milliGRAM(s) Oral at bedtime  ferrous    sulfate 325 milliGRAM(s) Oral <User Schedule>  folic acid 1 milliGRAM(s) Oral daily  furosemide    Tablet 40 milliGRAM(s) Oral every 12 hours  heparin   Injectable 5000 Unit(s) SubCutaneous every 8 hours  ipratropium 17 MICROgram(s) HFA Inhaler 1 Puff(s) Inhalation every 6 hours  meropenem  IVPB 1000 milliGRAM(s) IV Intermittent every 8 hours  metolazone 2.5 milliGRAM(s) Oral <User Schedule>  metoprolol succinate ER 25 milliGRAM(s) Oral daily  pantoprazole    Tablet 40 milliGRAM(s) Oral before breakfast  senna 2 Tablet(s) Oral at bedtime  simethicone 80 milliGRAM(s) Chew daily  sodium chloride 0.9% lock flush 3 milliLiter(s) IV Push every 8 hours    Medications:[PRN]  acetaminophen   Tablet .. 650 milliGRAM(s) Oral every 6 hours PRN  ibuprofen  Tablet. 400 milliGRAM(s) Oral every 8 hours PRN  oxyCODONE    IR 5 milliGRAM(s) Oral every 4 hours PRN    Labs:                        8.9    12.58 )-----------( 183      ( 15 Nov 2020 20:33 )             29.7   11-15    139  |  104  |  22<H>  ----------------------------<  128<H>  4.7   |  25  |  0.9    Ca    8.7      15 Nov 2020 20:33  Phos  2.9     11-15  Mg     2.0     11-15    TPro  5.5<L>  /  Alb  2.9<L>  /  TBili  0.3  /  DBili  <0.2  /  AST  32  /  ALT  24  /  AlkPhos  57  11-15  LIVER FUNCTIONS - ( 15 Nov 2020 20:33 )  Alb: 2.9 g/dL / Pro: 5.5 g/dL / ALK PHOS: 57 U/L / ALT: 24 U/L / AST: 32 U/L / GGT: x         PT/INR - ( 14 Nov 2020 19:07 )   PT: 14.10 sec;   INR: 1.23 ratio         PTT - ( 14 Nov 2020 19:07 )  PTT:30.9 sec    Micro/Urine:  Urinalysis Basic - ( 14 Nov 2020 12:05 )    Color: Yellow / Appearance: Clear / SG: >1.050 / pH: x  Gluc: x / Ketone: Trace  / Bili: Negative / Urobili: <2 mg/dL   Blood: x / Protein: 30 mg/dL / Nitrite: Positive   Leuk Esterase: Negative / RBC: 3 /HPF / WBC 5 /HPF   Sq Epi: x / Non Sq Epi: 3 /HPF / Bacteria: Many      Imaging:  None/24h

## 2020-11-16 NOTE — DISCHARGE NOTE PROVIDER - CARE PROVIDER_API CALL
[facility]  SURGERY  [address] [place] [id number]  Phone: [phone]  Fax: [phone]  Follow Up Time: 1 week

## 2020-11-16 NOTE — DISCHARGE NOTE PROVIDER - NSDCFUADDINST_GEN_ALL_CORE_FT
You are being discharged from the hospital after undergoing laparoscopic partial cholecystectomy  Continue your regular diet.  Dressings:  OK to shower normally. Do not scrub incision areas. Drain care: Please monitor and record drain output daily. Bring a written log with you to your postoperative appointment  Pain: Take ibuprofen, tylenol aas needed for pain every 6 hours. You are also being discharge with antibiotics, please take them as prescribed. Your medication have been sent to your pharmacy. Please continue your own home medications as previously prescribed. Contact your primary care provider with any questions.   Activity: Start walking as soon as possible to increase circulation and prevent blood clots. You may take care of your personal needs as desired, however, no lifting or strenuous activity is allowed for 6 weeks following surgery. Please avoid heavy lifting (anything over 10 pounds).   Follow up: Please plan to follow up  with Dr. Jacobsen in 1 week. Contact the office to confirm appointment. The phone number and address are available within discharge paperwork.  Please call the office or return to Emergency Department if you experience fever, shortness of breath, increasing abdominal pain, vomiting. Increasing redness, pain or drainage from incision or puncture sites   You are being discharged from the hospital after undergoing laparoscopic partial cholecystectomy  Continue your regular diet.  Dressings:  OK to shower normally. Do not scrub incision areas. You have staples in your surgical wounds, they will be removed in your next appointment with Dr. Jacobsen Drain care: Please monitor and record drain output daily. Bring a written log with you to your postoperative appointment  Pain: Take ibuprofen, tylenol aas needed for pain every 6 hours. You are also being discharge with antibiotics, please take them as prescribed. Your medication have been sent to your pharmacy. Please continue your own home medications as previously prescribed. Contact your primary care provider with any questions.   Activity: Start walking as soon as possible to increase circulation and prevent blood clots. You may take care of your personal needs as desired, however, no lifting or strenuous activity is allowed for 6 weeks following surgery. Please avoid heavy lifting (anything over 10 pounds).   Follow up: Please plan to follow up  with Dr. Jacobsen in 1 week. Contact the office to confirm appointment. The phone number and address are available within discharge paperwork.  Please call the office or return to Emergency Department if you experience fever, shortness of breath, increasing abdominal pain, vomiting. Increasing redness, pain or drainage from incision or puncture sites

## 2020-11-16 NOTE — PHYSICAL THERAPY INITIAL EVALUATION ADULT - GENERAL OBSERVATIONS, REHAB EVAL
9:30-10:00. Pt encountered in bedside chair in NAD, + O2 via nasal canula, 2L/min, +2 MAURISIO drains, +IV locked.

## 2020-11-16 NOTE — PHYSICAL THERAPY INITIAL EVALUATION ADULT - WEIGHT-BEARING RESTRICTIONS: GAIT, REHAB EVAL
RETURN TO WORK    December 12, 2017      Re: Angelique Joseph  [address] [facility] [id number]      his is to certify that Angeliuqe Joseph has been under my care from 12/1/2017 .    Pt is having shoulder pain bilateral, impaired range of motion degenerative joint disease with possible alternative diagnosis. Please allow to do work in a sitting position as this causes less straining to ongoing shoulder issue.     RESTRICTIONS: Avoid pushing pulling computer cart.  Pt may have limited pushing of wheel chairs.       REMARKS: Please provide this for the next 8 weeks.         SIGNATURE:___________________________________________,   12/12/2017      Ciarra Mcdonald, NP   [address] [place] [address]  
        RETURN TO WORK    December 8, 2017      Re: Angelique Joseph  [address] [facility] [id number]      This is to certify that Angelique Joseph has been under my care from 12/1/2017 .    Pt is having shoulder pain bilateral, impaired range of motion degenerative joint disease with possible alternative diagnosis. Please allow to do work in a sitting position as this causes less straining to ongoing shoulder issue.     RESTRICTIONS: Avoid pushing pulling computer cart.       REMARKS: Please provide this for the next 8 weeks.     SIGNATURE:___________________________________________,   12/8/2017      Ciarra Mcdonald, NP   [address] [place] [address]  
full weight-bearing

## 2020-11-16 NOTE — PHYSICAL THERAPY INITIAL EVALUATION ADULT - ADDITIONAL COMMENTS
Pt reported prior to admit date, she was walking 6-7 blocks when the weather was nice with straight cane. Pt also reports she has walker at home.

## 2020-11-16 NOTE — DISCHARGE NOTE PROVIDER - NSDCMRMEDTOKEN_GEN_ALL_CORE_FT
alendronate 70 mg oral tablet: 1 tab(s) orally once a week  aspirin 81 mg oral delayed release tablet: 1 tab(s) orally once a day  cefpodoxime 100 mg oral tablet: 1 tab(s) orally 2 times a day   ferrous sulfate 325 mg (65 mg elemental iron) oral tablet: 325 milligram(s) orally Tuesday, Thursday, Saturday, Sunday  folic acid 1 mg oral tablet: 1 tab(s) orally once a day  furosemide 40 mg oral tablet: 1 tab(s) orally every 12 hours  Lipitor 40 mg oral tablet: 1 tab(s) orally once a day  metOLazone 2.5 mg oral tablet: 1 tab(s) orally once a week (tuesdays)  Norvasc 5 mg oral tablet: 1 tab(s) orally once a day  pantoprazole 40 mg oral delayed release tablet: 1 tab(s) orally every 12 hours  Toprol-XL 25 mg oral tablet, extended release: 1 tab(s) orally once a day  Vitamin D3:    acetaminophen 325 mg oral tablet: 2 tab(s) orally every 6 hours, As needed, Moderate Pain (4 - 6)  alendronate 70 mg oral tablet: 1 tab(s) orally once a week  aspirin 81 mg oral delayed release tablet: 1 tab(s) orally once a day  ciprofloxacin 500 mg oral tablet: 1 tab(s) orally 2 times a day   ferrous sulfate 325 mg (65 mg elemental iron) oral tablet: 325 milligram(s) orally Tuesday, Thursday, Saturday, Sunday  Flagyl 500 mg oral tablet: 1 tab(s) orally every 8 hours   folic acid 1 mg oral tablet: 1 tab(s) orally once a day  furosemide 40 mg oral tablet: 1 tab(s) orally every 12 hours  Lipitor 40 mg oral tablet: 1 tab(s) orally once a day  metOLazone 2.5 mg oral tablet: 1 tab(s) orally once a week (tuesdays)  Norvasc 5 mg oral tablet: 1 tab(s) orally once a day  pantoprazole 40 mg oral delayed release tablet: 1 tab(s) orally every 12 hours  Toprol-XL 25 mg oral tablet, extended release: 1 tab(s) orally once a day  Vitamin D3:

## 2020-11-16 NOTE — PROGRESS NOTE ADULT - ASSESSMENT
Assessment:  87y Female patient admitted POD 2 s/p fenestrated laparoscopic cholecystectomy with 2 hayley drains in place , with the above physical exam, labs, and imaging findings.    Plan:  - Advance to reg diet, IVL  - PT/Rehab, ambulate as tolerated  - VNS for drain care  -Pain control as needed  -Hemodynamic monitoring as per routine  -Encourage ambulation and incentive spirometer use (10x/hr when awake)  -GI and DVT prophylaxis  -Check and replete CBC and BMP q daily  -Strict input and output monitoring  -Continue current management    Date/Time: 11-16-20 @ 07:32

## 2020-11-16 NOTE — PHYSICAL THERAPY INITIAL EVALUATION ADULT - PERTINENT HX OF CURRENT PROBLEM, REHAB EVAL
87 year old female reports abdominal pain located in the RUQ and epigastrium x 2 days. Pt underwent cholecystectomy surgery 11/14

## 2020-11-16 NOTE — CONSULT NOTE ADULT - ASSESSMENT
IMPRESSION: Rehab of debility, cholecystitis, s/p lap tony    PRECAUTIONS: [ x ] Cardiac  [  ] Respiratory  [  ] Seizures [  ] Contact Isolation  [  ] Droplet Isolation  [  ] Other    Weight Bearing Status:     RECOMMENDATION:    Out of Bed to Chair     DVT/Decubiti Prophylaxis    REHAB PLAN:     [ x  ] Bedside P/T 3-5 times a week   [   ]   Bedside O/T  2-3 times a week             [   ] No Rehab Therapy Indicated                   [   ]  Speech Therapy   Conditioning/ROM                                    ADL  Bed Mobility                                               Conditioning/ROM  Transfers                                                     Bed Mobility  Sitting /Standing Balance                         Transfers                                        Gait Training                                               Sitting/Standing Balance  Stair Training [   ]Applicable                    Home equipment Eval                                                                        Splinting  [   ] Only      GOALS:   ADL   [ x  ]   Independent                    Transfers  [  x ] Independent                          Ambulation  [ x  ] Independent     [ x   ] With device                            [   ]  CG                                                         [   ]  CG                                                                  [   ] CG                            [    ] Min A                                                   [   ] Min A                                                              [   ] Min  A          DISCHARGE PLAN:   [   ]  Good candidate for Intensive Rehabilitation/Hospital based-4A [facility]                                             Will tolerate 3hrs Intensive Rehab Daily                                       [  x  ]  Short Term Rehab in Skilled Nursing Facility is suggested.                                                               VS                                     [x    ]  Home with Outpatient or VN services                                         [    ]  Possible Candidate for Intensive Hospital based Rehab

## 2020-11-18 LAB — SURGICAL PATHOLOGY STUDY: SIGNIFICANT CHANGE UP

## 2020-11-20 DIAGNOSIS — Z91.010 ALLERGY TO PEANUTS: ICD-10-CM

## 2020-11-20 DIAGNOSIS — Z91.018 ALLERGY TO OTHER FOODS: ICD-10-CM

## 2020-11-20 DIAGNOSIS — K65.1 PERITONEAL ABSCESS: ICD-10-CM

## 2020-11-20 DIAGNOSIS — Z95.1 PRESENCE OF AORTOCORONARY BYPASS GRAFT: ICD-10-CM

## 2020-11-20 DIAGNOSIS — K80.00 CALCULUS OF GALLBLADDER WITH ACUTE CHOLECYSTITIS WITHOUT OBSTRUCTION: ICD-10-CM

## 2020-11-20 DIAGNOSIS — M81.0 AGE-RELATED OSTEOPOROSIS WITHOUT CURRENT PATHOLOGICAL FRACTURE: ICD-10-CM

## 2020-11-20 DIAGNOSIS — I25.10 ATHEROSCLEROTIC HEART DISEASE OF NATIVE CORONARY ARTERY WITHOUT ANGINA PECTORIS: ICD-10-CM

## 2020-11-20 DIAGNOSIS — I11.0 HYPERTENSIVE HEART DISEASE WITH HEART FAILURE: ICD-10-CM

## 2020-11-20 DIAGNOSIS — E78.5 HYPERLIPIDEMIA, UNSPECIFIED: ICD-10-CM

## 2020-11-20 DIAGNOSIS — I50.9 HEART FAILURE, UNSPECIFIED: ICD-10-CM

## 2021-07-08 NOTE — ED PROVIDER NOTE - ATTENDING CONTRIBUTION TO CARE
88yo F with PMHx CAD/CABG, CHF, HTN, cholecystitis s/p perc tony (6/5) s/p ERCP/ sphincterotomy/ stone extraction/ CBD stent placement (7/15) s/p stent exchange (8/25) and removal (9/22), presents for BRBPR since last night. Pt noticed bright red blood on toilet paper when wiping. Stool is dark brown. Denies rectal pain. Not on AP/AC. Denies abd pain. Denies fever, nausea, vomiting, diarrhea. Denies lightheadedness, CP, SOB, cough, diarrhea, dysuria, hematuria.     Vital signs reviewed  GENERAL: Patient nontoxic appearing, NAD  HEAD: NCAT  EYES: Anicteric  ENT: MMM  RESPIRATORY: Normal respiratory effort. CTA B/L. No wheezing, rales, rhonchi  CARDIOVASCULAR: Regular rate and rhythm  ABDOMEN: Soft. Nondistended. Nontender. No guarding or rebound. No CVA tenderness. Rectal exam as above  MUSCULOSKELETAL/EXTREMITIES: Brisk cap refill.   SKIN:  Warm and dry  NEURO: AAOx3. No gross FND.
no

## 2022-07-14 NOTE — DISCHARGE NOTE NURSING/CASE MANAGEMENT/SOCIAL WORK - FLU SEASON?
Pt called back and I relayed the message. She declined pain management referral at this time. Yes...

## 2022-11-23 NOTE — PRE-ANESTHESIA EVALUATION ADULT - NSANTHRISKNONERD_GEN_ALL_CORE
[General Appearance - Well Developed] : well developed [Oriented To Time, Place, And Person] : oriented to person, place, and time [Normal Station and Gait] : the gait and station were normal for the patient's age [No Focal Deficits] : no focal deficits [Abdomen Soft] : soft [Abdomen Tenderness] : non-tender [Costovertebral Angle Tenderness] : no ~M costovertebral angle tenderness [Urethral Meatus] : meatus normal [Penis Abnormality] : normal circumcised penis [Urinary Bladder Findings] : the bladder was normal on palpation [Scrotum] : the scrotum was normal [Epididymis] : the epididymides were normal [Testes Mass (___cm)] : there were no testicular masses [Rectal Exam - Rectum] : digital rectal exam was normal [No Prostate Nodules] : no prostate nodules [Prostate Size ___ (0-4)] : prostate size [unfilled] (scale: 0-4) [] : no rash [Edema] : no peripheral edema Risk Alerts:

## 2023-01-30 NOTE — ASU DISCHARGE PLAN (ADULT/PEDIATRIC) - CONDITION AT DISCHARGE
72yo F with PMHx of  HLD, hypothyroid presented to ED c/o cough, congestion for the past 5 days, Son at beside states pt initially went to  was prescribed cough medication but without relief, pt went back to  yesterday and was given more cough medications but was c/o worsening symptoms now with cp and sob came to ED for further workup. Pt c/o 7/10 chest pressure worsened with breathing, denies fever, chills, n/v, abdominal pain, diarrhea or dysuria. In the ED found to have AFib with RVR HR in the 150s, pt was given diltiazem 10mg IVP x 2 doses and 30mg PO x 1 dose, initial EKG concern for pericarditis but subsequent EKG improved, she was given Ibuprofen 600mg x 1 dose. During encounter pt c/o substernal chest pressure, HR 150s on tele monitor, given stat Lopressor 5mg IV x 1 dose with good rate control, given morphine 2mg IV x dose. RVP positive for Coronavirus(229E), troponin neg x 1.    Stable

## 2023-10-16 NOTE — ED PROVIDER NOTE - RATE
99 Topical Ketoconazole Pregnancy And Lactation Text: This medication is Pregnancy Category B and is considered safe during pregnancy. It is unknown if it is excreted in breast milk.

## 2024-03-18 NOTE — ED PROVIDER NOTE - ENMT NEGATIVE STATEMENT, MLM
No Ears: no ear pain and no hearing problems.Nose: no nasal congestion and no nasal drainage.Mouth/Throat: no dysphagia, no hoarseness and no throat pain.Neck: no lumps, no pain, no stiffness and no swollen glands. Yes

## 2024-04-25 PROBLEM — M81.0 AGE-RELATED OSTEOPOROSIS WITHOUT CURRENT PATHOLOGICAL FRACTURE: Chronic | Status: ACTIVE | Noted: 2020-11-14

## 2024-05-13 ENCOUNTER — APPOINTMENT (OUTPATIENT)
Dept: OTOLARYNGOLOGY | Facility: CLINIC | Age: 89
End: 2024-05-13
Payer: MEDICARE

## 2024-05-13 VITALS
TEMPERATURE: 98.1 F | SYSTOLIC BLOOD PRESSURE: 149 MMHG | HEART RATE: 79 BPM | WEIGHT: 136.69 LBS | OXYGEN SATURATION: 95 % | DIASTOLIC BLOOD PRESSURE: 64 MMHG

## 2024-05-13 DIAGNOSIS — H90.3 SENSORINEURAL HEARING LOSS, BILATERAL: ICD-10-CM

## 2024-05-13 PROCEDURE — 99203 OFFICE O/P NEW LOW 30 MIN: CPT | Mod: 25

## 2024-05-13 PROCEDURE — 92504 EAR MICROSCOPY EXAMINATION: CPT

## 2024-05-13 PROCEDURE — 92557 COMPREHENSIVE HEARING TEST: CPT

## 2024-05-13 PROCEDURE — 92567 TYMPANOMETRY: CPT

## 2024-05-13 RX ORDER — METOLAZONE 2.5 MG/1
2.5 TABLET ORAL
Refills: 0 | Status: ACTIVE | COMMUNITY

## 2024-05-13 RX ORDER — FERROUS SULFATE 325(65) MG
325 TABLET ORAL
Refills: 0 | Status: ACTIVE | COMMUNITY

## 2024-05-13 RX ORDER — CLOTRIMAZOLE 10 MG/G
1 CREAM TOPICAL
Refills: 0 | Status: ACTIVE | COMMUNITY

## 2024-05-13 RX ORDER — AMLODIPINE BESYLATE 5 MG/1
5 TABLET ORAL
Refills: 0 | Status: ACTIVE | COMMUNITY

## 2024-05-13 RX ORDER — TORSEMIDE 20 MG/1
20 TABLET ORAL
Refills: 0 | Status: ACTIVE | COMMUNITY

## 2024-05-13 RX ORDER — FOLIC ACID 1 MG/1
1 TABLET ORAL
Refills: 0 | Status: ACTIVE | COMMUNITY

## 2024-05-13 RX ORDER — NYSTATIN 100000 [USP'U]/ML
100000 SUSPENSION ORAL
Refills: 0 | Status: ACTIVE | COMMUNITY

## 2024-05-13 RX ORDER — METOPROLOL SUCCINATE 25 MG/1
25 TABLET, EXTENDED RELEASE ORAL
Refills: 0 | Status: ACTIVE | COMMUNITY

## 2024-05-13 RX ORDER — CYANOCOBALAMIN (VITAMIN B-12) 1000 MCG
TABLET ORAL
Refills: 0 | Status: ACTIVE | COMMUNITY

## 2024-05-13 RX ORDER — PANTOPRAZOLE 40 MG/1
TABLET, DELAYED RELEASE ORAL
Refills: 0 | Status: ACTIVE | COMMUNITY

## 2024-05-13 RX ORDER — ATORVASTATIN CALCIUM 40 MG/1
40 TABLET, FILM COATED ORAL
Refills: 0 | Status: ACTIVE | COMMUNITY

## 2024-05-13 RX ORDER — CARBOXYMETHYLCELLULOSE SODIUM 5 MG/ML
0.5 SOLUTION/ DROPS OPHTHALMIC
Refills: 0 | Status: ACTIVE | COMMUNITY

## 2024-05-13 NOTE — HISTORY OF PRESENT ILLNESS
[de-identified] : Speaks only Georgian; her daughter translated & assisted w/ the hx.  Having trouble hearing for almost a year; no tinnitus. Denies: ear pain, drainage, frequent loud noise exp/shooting, frequent infections, hx of ear surgery or IV antibiotics/chemo; denies a FHx of hearing loss. Qtip use: yes

## 2024-05-13 NOTE — ASSESSMENT
[FreeTextEntry1] : HAE ordered; RTC annually for audios, sooner as needed for worsening hearing or tinnitus.

## 2024-05-28 ENCOUNTER — APPOINTMENT (OUTPATIENT)
Dept: PHARMACY | Facility: CLINIC | Age: 89
End: 2024-05-28

## 2024-09-12 NOTE — DISCHARGE NOTE PROVIDER - YES NO FOR MLM POSITIVE OR NEGATIVE COVID RESULT
Pt tolerated Etoposide infusion well.  No signs of extravasation noted.  Bood return noted to port. Site remains free of discoloration, pt denies any pain.  Pt updated on plan of care.      ,

## 2025-01-12 NOTE — ED ADULT NURSE NOTE - PSH
Pt given d/c instructions, f/u info and RX x 2 with verbal understanding.  VSS at discharge. Pt ambulatory from the ED w/ steady gait.  All belongings in possession on discharge.  Pt escorted to the lobby by RN.  
Cholecystostomy care    S/P CABG (coronary artery bypass graft)

## 2025-04-23 NOTE — ED PROVIDER NOTE - CHIEF COMPLAINT
The patient is a 87y Female complaining of abdominal pain. Advancement-Rotation Flap Text: The defect edges were debeveled with a #15 scalpel blade.  Given the location of the defect, shape of the defect and the proximity to free margins an advancement-rotation flap was deemed most appropriate.  Using a sterile surgical marker, an appropriate flap was drawn incorporating the defect and placing the expected incisions within the relaxed skin tension lines where possible. The area thus outlined was incised deep to adipose tissue with a #15 scalpel blade.  The skin margins were undermined to an appropriate distance in all directions utilizing iris scissors.